# Patient Record
Sex: FEMALE | Race: WHITE | Employment: OTHER | URBAN - METROPOLITAN AREA
[De-identification: names, ages, dates, MRNs, and addresses within clinical notes are randomized per-mention and may not be internally consistent; named-entity substitution may affect disease eponyms.]

---

## 2017-10-31 PROBLEM — I63.9 CEREBROVASCULAR ACCIDENT (CVA) (HCC): Status: ACTIVE | Noted: 2017-10-31

## 2017-10-31 PROBLEM — K27.4 PEPTIC ULCER WITH HEMORRHAGE: Status: ACTIVE | Noted: 2017-10-31

## 2017-10-31 PROBLEM — J44.9 CHRONIC OBSTRUCTIVE PULMONARY DISEASE (HCC): Status: ACTIVE | Noted: 2017-10-31

## 2017-10-31 PROBLEM — I10 HYPERTENSION: Status: ACTIVE | Noted: 2017-10-31

## 2019-04-01 ENCOUNTER — OFFICE VISIT (OUTPATIENT)
Dept: FAMILY MEDICINE CLINIC | Age: 74
End: 2019-04-01
Payer: MEDICARE

## 2019-04-01 ENCOUNTER — HOSPITAL ENCOUNTER (OUTPATIENT)
Age: 74
Discharge: HOME | End: 2019-04-03
Payer: MEDICARE

## 2019-04-01 VITALS
HEART RATE: 108 BPM | TEMPERATURE: 98.2 F | WEIGHT: 160.8 LBS | DIASTOLIC BLOOD PRESSURE: 110 MMHG | RESPIRATION RATE: 18 BRPM | BODY MASS INDEX: 34.69 KG/M2 | SYSTOLIC BLOOD PRESSURE: 200 MMHG | HEIGHT: 57 IN | OXYGEN SATURATION: 98 %

## 2019-04-01 DIAGNOSIS — Z72.0 TOBACCO ABUSE: ICD-10-CM

## 2019-04-01 DIAGNOSIS — D64.9 ANEMIA, UNSPECIFIED TYPE: ICD-10-CM

## 2019-04-01 DIAGNOSIS — E78.2 MIXED HYPERLIPIDEMIA: ICD-10-CM

## 2019-04-01 DIAGNOSIS — R73.01 IFG (IMPAIRED FASTING GLUCOSE): ICD-10-CM

## 2019-04-01 DIAGNOSIS — R53.83 FATIGUE, UNSPECIFIED TYPE: ICD-10-CM

## 2019-04-01 DIAGNOSIS — I10 ESSENTIAL HYPERTENSION: ICD-10-CM

## 2019-04-01 DIAGNOSIS — Z12.39 SCREENING FOR BREAST CANCER: ICD-10-CM

## 2019-04-01 DIAGNOSIS — J44.9 CHRONIC OBSTRUCTIVE PULMONARY DISEASE, UNSPECIFIED COPD TYPE (HCC): ICD-10-CM

## 2019-04-01 DIAGNOSIS — J44.9 CHRONIC OBSTRUCTIVE PULMONARY DISEASE, UNSPECIFIED COPD TYPE (HCC): Primary | ICD-10-CM

## 2019-04-01 LAB
ALBUMIN SERPL-MCNC: 4.6 G/DL (ref 3.5–5.2)
ALP BLD-CCNC: 109 U/L (ref 35–104)
ALT SERPL-CCNC: 12 U/L (ref 0–32)
ANION GAP SERPL CALCULATED.3IONS-SCNC: 17 MMOL/L (ref 7–16)
AST SERPL-CCNC: 21 U/L (ref 0–31)
BASOPHILS ABSOLUTE: 0.04 E9/L (ref 0–0.2)
BASOPHILS RELATIVE PERCENT: 0.6 % (ref 0–2)
BILIRUB SERPL-MCNC: 0.4 MG/DL (ref 0–1.2)
BUN BLDV-MCNC: 15 MG/DL (ref 8–23)
CALCIUM SERPL-MCNC: 9.4 MG/DL (ref 8.6–10.2)
CHLORIDE BLD-SCNC: 98 MMOL/L (ref 98–107)
CHOLESTEROL, TOTAL: 223 MG/DL (ref 0–199)
CO2: 28 MMOL/L (ref 22–29)
CREAT SERPL-MCNC: 0.7 MG/DL (ref 0.5–1)
EOSINOPHILS ABSOLUTE: 0.09 E9/L (ref 0.05–0.5)
EOSINOPHILS RELATIVE PERCENT: 1.3 % (ref 0–6)
GFR AFRICAN AMERICAN: >60
GFR NON-AFRICAN AMERICAN: >60 ML/MIN/1.73
GLUCOSE BLD-MCNC: 112 MG/DL (ref 74–99)
HBA1C MFR BLD: 5.3 % (ref 4–5.6)
HCT VFR BLD CALC: 49.1 % (ref 34–48)
HDLC SERPL-MCNC: 70 MG/DL
HEMOGLOBIN: 15.2 G/DL (ref 11.5–15.5)
IMMATURE GRANULOCYTES #: 0.02 E9/L
IMMATURE GRANULOCYTES %: 0.3 % (ref 0–5)
LDL CHOLESTEROL CALCULATED: 122 MG/DL (ref 0–99)
LYMPHOCYTES ABSOLUTE: 1.1 E9/L (ref 1.5–4)
LYMPHOCYTES RELATIVE PERCENT: 16.1 % (ref 20–42)
MCH RBC QN AUTO: 31.5 PG (ref 26–35)
MCHC RBC AUTO-ENTMCNC: 31 % (ref 32–34.5)
MCV RBC AUTO: 101.9 FL (ref 80–99.9)
MONOCYTES ABSOLUTE: 0.48 E9/L (ref 0.1–0.95)
MONOCYTES RELATIVE PERCENT: 7 % (ref 2–12)
NEUTROPHILS ABSOLUTE: 5.12 E9/L (ref 1.8–7.3)
NEUTROPHILS RELATIVE PERCENT: 74.7 % (ref 43–80)
PDW BLD-RTO: 13.8 FL (ref 11.5–15)
PLATELET # BLD: 285 E9/L (ref 130–450)
PMV BLD AUTO: 11.7 FL (ref 7–12)
POTASSIUM SERPL-SCNC: 4.6 MMOL/L (ref 3.5–5)
RBC # BLD: 4.82 E12/L (ref 3.5–5.5)
SODIUM BLD-SCNC: 143 MMOL/L (ref 132–146)
TOTAL PROTEIN: 8.5 G/DL (ref 6.4–8.3)
TRIGL SERPL-MCNC: 155 MG/DL (ref 0–149)
TSH SERPL DL<=0.05 MIU/L-ACNC: 1.3 UIU/ML (ref 0.27–4.2)
VLDLC SERPL CALC-MCNC: 31 MG/DL
WBC # BLD: 6.9 E9/L (ref 4.5–11.5)

## 2019-04-01 PROCEDURE — G8599 NO ASA/ANTIPLAT THER USE RNG: HCPCS | Performed by: FAMILY MEDICINE

## 2019-04-01 PROCEDURE — 3023F SPIROM DOC REV: CPT | Performed by: FAMILY MEDICINE

## 2019-04-01 PROCEDURE — 99215 OFFICE O/P EST HI 40 MIN: CPT | Performed by: FAMILY MEDICINE

## 2019-04-01 PROCEDURE — G8926 SPIRO NO PERF OR DOC: HCPCS | Performed by: FAMILY MEDICINE

## 2019-04-01 PROCEDURE — 3017F COLORECTAL CA SCREEN DOC REV: CPT | Performed by: FAMILY MEDICINE

## 2019-04-01 PROCEDURE — 83036 HEMOGLOBIN GLYCOSYLATED A1C: CPT

## 2019-04-01 PROCEDURE — 1090F PRES/ABSN URINE INCON ASSESS: CPT | Performed by: FAMILY MEDICINE

## 2019-04-01 PROCEDURE — 36415 COLL VENOUS BLD VENIPUNCTURE: CPT | Performed by: FAMILY MEDICINE

## 2019-04-01 PROCEDURE — 85025 COMPLETE CBC W/AUTO DIFF WBC: CPT

## 2019-04-01 PROCEDURE — 84443 ASSAY THYROID STIM HORMONE: CPT

## 2019-04-01 PROCEDURE — 80053 COMPREHEN METABOLIC PANEL: CPT

## 2019-04-01 PROCEDURE — 1123F ACP DISCUSS/DSCN MKR DOCD: CPT | Performed by: FAMILY MEDICINE

## 2019-04-01 PROCEDURE — G8417 CALC BMI ABV UP PARAM F/U: HCPCS | Performed by: FAMILY MEDICINE

## 2019-04-01 PROCEDURE — G8427 DOCREV CUR MEDS BY ELIG CLIN: HCPCS | Performed by: FAMILY MEDICINE

## 2019-04-01 PROCEDURE — 1036F TOBACCO NON-USER: CPT | Performed by: FAMILY MEDICINE

## 2019-04-01 PROCEDURE — 4040F PNEUMOC VAC/ADMIN/RCVD: CPT | Performed by: FAMILY MEDICINE

## 2019-04-01 PROCEDURE — G8400 PT W/DXA NO RESULTS DOC: HCPCS | Performed by: FAMILY MEDICINE

## 2019-04-01 PROCEDURE — 80061 LIPID PANEL: CPT

## 2019-04-01 RX ORDER — LISINOPRIL 5 MG/1
TABLET ORAL
Qty: 90 TABLET | Refills: 1 | Status: SHIPPED | OUTPATIENT
Start: 2019-04-01 | End: 2019-04-10 | Stop reason: ALTCHOICE

## 2019-04-01 RX ORDER — METOPROLOL TARTRATE 50 MG/1
TABLET, FILM COATED ORAL
Qty: 180 TABLET | Refills: 1 | Status: SHIPPED
Start: 2019-04-01 | End: 2021-05-17 | Stop reason: SDUPTHER

## 2019-04-01 RX ORDER — FUROSEMIDE 40 MG/1
TABLET ORAL
Qty: 90 TABLET | Refills: 1 | Status: SHIPPED
Start: 2019-04-01 | End: 2021-05-17 | Stop reason: SDUPTHER

## 2019-04-01 RX ORDER — ALBUTEROL SULFATE 90 UG/1
2 AEROSOL, METERED RESPIRATORY (INHALATION) EVERY 6 HOURS PRN
Qty: 1 INHALER | Refills: 3 | Status: SHIPPED | OUTPATIENT
Start: 2019-04-01 | End: 2019-04-10 | Stop reason: CLARIF

## 2019-04-01 ASSESSMENT — PATIENT HEALTH QUESTIONNAIRE - PHQ9
SUM OF ALL RESPONSES TO PHQ9 QUESTIONS 1 & 2: 0
SUM OF ALL RESPONSES TO PHQ QUESTIONS 1-9: 0
1. LITTLE INTEREST OR PLEASURE IN DOING THINGS: 0
SUM OF ALL RESPONSES TO PHQ QUESTIONS 1-9: 0
2. FEELING DOWN, DEPRESSED OR HOPELESS: 0

## 2019-04-04 ASSESSMENT — ENCOUNTER SYMPTOMS
EYE PAIN: 0
ALLERGIC/IMMUNOLOGIC NEGATIVE: 1
NAUSEA: 0
TROUBLE SWALLOWING: 0
COLOR CHANGE: 0
ABDOMINAL DISTENTION: 0
SINUS PRESSURE: 0
COUGH: 1
SORE THROAT: 0
CHEST TIGHTNESS: 0
BACK PAIN: 0
SINUS PAIN: 0
VOICE CHANGE: 0
CHOKING: 0
PHOTOPHOBIA: 0
EYE REDNESS: 0
EYES NEGATIVE: 1
RHINORRHEA: 0
FACIAL SWELLING: 0
ABDOMINAL PAIN: 0
WHEEZING: 1
RECTAL PAIN: 0
EYE DISCHARGE: 0
STRIDOR: 0
ANAL BLEEDING: 0
BLOOD IN STOOL: 0
VOMITING: 0
DIARRHEA: 0
SHORTNESS OF BREATH: 1
EYE ITCHING: 0
CONSTIPATION: 0

## 2019-04-05 NOTE — PROGRESS NOTES
SUBJECTIVE  Francesco Delcid is a 68 y.o. female. HPI/Chief C/O:  Chief Complaint   Patient presents with    Annual Exam     Pt here today for an annual exam    Medication Refill     Pt here today for medication refills- meds pended     No Known Allergies  This 68year old female presents for physical exam. Pt has COPD ([address] hypertension, hyperlipidemia, fatigue, anemia, and tobacco abuse. ROS:  Review of Systems   Constitutional: Positive for fatigue. Negative for activity change, appetite change, chills, diaphoresis, fever and unexpected weight change. HENT: Negative. Negative for congestion, dental problem, drooling, ear discharge, ear pain, facial swelling, hearing loss, mouth sores, nosebleeds, postnasal drip, rhinorrhea, sinus pressure, sinus pain, sneezing, sore throat, tinnitus, trouble swallowing and voice change. Eyes: Negative. Negative for photophobia, pain, discharge, redness, itching and visual disturbance. Respiratory: Positive for cough, shortness of breath and wheezing. Negative for choking, chest tightness and stridor. Cardiovascular: Negative. Negative for chest pain, palpitations and leg swelling. Gastrointestinal: Negative for abdominal distention, abdominal pain, anal bleeding, blood in stool, constipation, diarrhea, nausea, rectal pain and vomiting. Endocrine: Negative. Negative for cold intolerance, heat intolerance, polydipsia, polyphagia and polyuria. Genitourinary: Negative. Negative for decreased urine volume, difficulty urinating, dysuria, flank pain, frequency, genital sores, hematuria, menstrual problem, pelvic pain and urgency. Musculoskeletal: Positive for arthralgias. Negative for back pain, gait problem, joint swelling, myalgias, neck pain and neck stiffness. Skin: Negative. Negative for color change, pallor, rash and wound. Allergic/Immunologic: Negative. Neurological: Negative.   Negative for dizziness, tremors, seizures, syncope, facial asymmetry, speech difficulty, weakness, light-headedness, numbness and headaches. Hematological: Negative. Negative for adenopathy. Does not bruise/bleed easily. Psychiatric/Behavioral: Negative for agitation, behavioral problems, confusion, decreased concentration, dysphoric mood, hallucinations, self-injury, sleep disturbance and suicidal ideas. The patient is nervous/anxious. The patient is not hyperactive. Past Medical/Surgical Hx;  Reviewed with patient      Diagnosis Date    Hypertension     Stroke [facility])      Past Surgical History:   Procedure Laterality Date    TONSILLECTOMY         Past Family Hx:  Reviewed with patient      Problem Relation Age of Onset    High Blood Pressure Mother     Other Father     High Blood Pressure Father        Social Hx:  Reviewed with patient  Social History     Tobacco Use    Smoking status: Former Smoker     Packs/day: 2.00     Years: 54.00     Pack years: 108.00     Types: Cigarettes     Start date: 1963     Last attempt to quit: 2017     Years since quittin.5    Smokeless tobacco: Never Used   Substance Use Topics    Alcohol use: Yes       OBJECTIVE  BP (!) 200/110   Pulse 108   Temp 98.2 °F (36.8 °C)   Resp 18   Ht 4' 9.01\" (1.448 m)   Wt 160 lb 12.8 oz (72.9 kg)   SpO2 98%   Breastfeeding? No   BMI 34.79 kg/m²     Problem List:  Adelfo Florentino does not have any pertinent problems on file. PHYS EX:  Physical Exam   Constitutional: She is oriented to person, place, and time. She appears well-developed and well-nourished. No distress. HENT:   Head: Normocephalic and atraumatic. Right Ear: External ear normal.   Left Ear: External ear normal.   Nose: Nose normal.   Mouth/Throat: Oropharynx is clear and moist. No oropharyngeal exudate. Eyes: Pupils are equal, round, and reactive to light. Conjunctivae and EOM are normal. Right eye exhibits no discharge. Left eye exhibits no discharge. No scleral icterus.    Neck: Normal range of motion. Neck supple. No JVD present. No tracheal deviation present. No thyromegaly present. Cardiovascular: Normal rate, regular rhythm, normal heart sounds and intact distal pulses. Exam reveals no gallop and no friction rub. No murmur heard. Pulmonary/Chest: Effort normal. No stridor. No respiratory distress. She has no wheezes. She has no rales. She exhibits no tenderness. Pt has distant air sounds. Abdominal: Soft. Bowel sounds are normal. She exhibits no distension and no mass. There is no tenderness. There is no rebound and no guarding. No hernia. Musculoskeletal: She exhibits tenderness. She exhibits no edema or deformity. Pain and decreased ROM multiple joints. Lymphadenopathy:     She has no cervical adenopathy. Neurological: She is alert and oriented to person, place, and time. She has normal reflexes. She displays normal reflexes. No cranial nerve deficit or sensory deficit. She exhibits normal muscle tone. Coordination normal.   Skin: Skin is warm. No rash noted. She is not diaphoretic. No erythema. No pallor. Psychiatric: She has a normal mood and affect. Her behavior is normal. Judgment and thought content normal.   Nursing note and vitals reviewed. Quality & Risk Score Accuracy    Visit Dx:  J44.9 - Chronic obstructive pulmonary disease, unspecified COPD type ([facility]  Assessment and plan:  Stable based upon symptoms and exam. Continue current treatment plan and follow up at least yearly. Last edited 04/04/19 22:13 EDT by Sim Rinne, DO           ASSESSMENT/PLAN  Compa was seen today for annual exam and medication refill. Diagnoses and all orders for this visit:    Chronic obstructive pulmonary disease, unspecified COPD type ([facility]  -     albuterol sulfate  (90 Base) MCG/ACT inhaler; Inhale 2 puffs into the lungs every 6 hours as needed for Wheezing  -     mometasone-formoterol (DULERA) 200-5 MCG/ACT inhaler;  Inhale 1 puff into the lungs 2 times daily  - CBC Auto Differential; Future  -     Comprehensive Metabolic Panel; Future  Stable. Lab, albuteol, dulera, oxygen. Pt refuses to take spiriva due to cost.   Essential hypertension  -     furosemide (LASIX) 40 MG tablet; TAKE ONE TABLET BY MOUTH EVERY DAY  -     metoprolol tartrate (LOPRESSOR) 50 MG tablet; TAKE ONE TABLET BY MOUTH TWO TIMES A DAY  -     lisinopril (PRINIVIL;ZESTRIL) 5 MG tablet; TAKE ONE TABLET BY MOUTH EVERY DAY  -     CBC Auto Differential; Future  -     Comprehensive Metabolic Panel; Future  Controlled. Lab, BB, ace, lasix. Mixed hyperlipidemia  -     CBC Auto Differential; Future  -     Comprehensive Metabolic Panel; Future  -     Lipid Panel; Future  Not controlled. Lab, low chol. Diet. Fatigue, unspecified type  -     CBC Auto Differential; Future  -     Comprehensive Metabolic Panel; Future  -     TSH without Reflex; Future  Not controlled. Lab. IFG (impaired fasting glucose)  -     CBC Auto Differential; Future  -     Comprehensive Metabolic Panel; Future  -     Hemoglobin A1C; Future  -     Lipid Panel; Future  Patient instructed on labs    Anemia, unspecified type  -     CBC Auto Differential; Future  -     Comprehensive Metabolic Panel; Future  Stable. Lab, anemia. Screening for breast cancer  -     CBC Auto Differential; Future  -     Comprehensive Metabolic Panel; Future  -     RINA DIGITAL SCREEN W OR WO CAD BILATERAL; Future  Pt instructed on curry. Rina.   Tobacco abuse  -     CBC Auto Differential; Future  -     Comprehensive Metabolic Panel; Future  -     CT LUNG SCREENING (ANNUAL); Future  Not controlled. Lab, low dose CT lung. Pt instructed if any worse go ED ASAP.         Outpatient Encounter Medications as of 4/1/2019   Medication Sig Dispense Refill    furosemide (LASIX) 40 MG tablet TAKE ONE TABLET BY MOUTH EVERY DAY 90 tablet 1    metoprolol tartrate (LOPRESSOR) 50 MG tablet TAKE ONE TABLET BY MOUTH TWO TIMES A  tablet 1    lisinopril (PRINIVIL;ZESTRIL) 5 MG tablet TAKE ONE TABLET BY MOUTH EVERY DAY 90 tablet 1    albuterol sulfate  (90 Base) MCG/ACT inhaler Inhale 2 puffs into the lungs every 6 hours as needed for Wheezing 1 Inhaler 3    mometasone-formoterol (DULERA) 200-5 MCG/ACT inhaler Inhale 1 puff into the lungs 2 times daily 1 Inhaler 3    ferrous sulfate 325 (65 Fe) MG tablet TAKE ONE TABLET BY MOUTH TWO TIMES A DAY  0    guaiFENesin (ROBITUSSIN) 100 MG/5ML liquid TAKE 10 MLS BY MOUTH TWICE DAILY FOR 7 DAYS. 236 mL 0    sucralfate (CARAFATE) 1 GM tablet Take 1 tablet by mouth 4 times daily 949 tablet 3    folic acid (FOLVITE) 1 MG tablet TAKE ONE TABLET BY MOUTH EVERY DAY 90 tablet 1    vitamin B-1 (THIAMINE) 100 MG tablet TAKE ONE TABLET BY MOUTH DAILY 90 tablet 1    [DISCONTINUED] lisinopril (PRINIVIL;ZESTRIL) 5 MG tablet TAKE ONE TABLET BY MOUTH EVERY DAY  0    [DISCONTINUED] albuterol sulfate  (90 Base) MCG/ACT inhaler Inhale 2 puffs into the lungs every 6 hours as needed for Wheezing      [DISCONTINUED] pantoprazole (PROTONIX) 40 MG tablet Take 40 mg by mouth daily      [DISCONTINUED] furosemide (LASIX) 40 MG tablet TAKE ONE TABLET BY MOUTH EVERY DAY 90 tablet 1    [DISCONTINUED] fluticasone (FLONASE) 50 MCG/ACT nasal spray 1 spray by Nasal route daily 1 Bottle 3    [DISCONTINUED] metoprolol tartrate (LOPRESSOR) 50 MG tablet TAKE ONE TABLET BY MOUTH TWO TIMES A  tablet 1    [DISCONTINUED] mometasone-formoterol (DULERA) 200-5 MCG/ACT inhaler Inhale 1 puff into the lungs 2 times daily 1 Inhaler 0    [DISCONTINUED] tiotropium (SPIRIVA RESPIMAT) 2.5 MCG/ACT AERS inhaler Inhale 2 puffs into the lungs daily 1 Inhaler 0     No facility-administered encounter medications on file as of 4/1/2019. Return in about 1 week (around 4/8/2019).   Quality & Risk Score Accuracy    Visit Dx:  J44.9 - Chronic obstructive pulmonary disease, unspecified COPD type ([address]  Assessment and plan:  Stable based upon symptoms and exam. Continue current treatment plan and follow up at least yearly.   Last edited 04/04/19 22:13 EDT by Phyllis Chatman DO           Reviewed recent labs related to Compa's current problems      Discussed importance of regular Health Maintenance follow up  Health Maintenance   Topic    Hepatitis C screen     DTaP/Tdap/Td vaccine (1 - Tdap)    Breast cancer screen     Shingles Vaccine (1 of 2)    Colon cancer screen colonoscopy     Low dose CT lung screening     DEXA (modify frequency per FRAX score)     Pneumococcal 65+ years Vaccine (2 of 2 - PPSV23)    Flu vaccine (Season Ended)    Potassium monitoring     Creatinine monitoring     Lipid screen

## 2019-04-10 ENCOUNTER — OFFICE VISIT (OUTPATIENT)
Dept: FAMILY MEDICINE CLINIC | Age: 74
End: 2019-04-10
Payer: MEDICARE

## 2019-04-10 VITALS
TEMPERATURE: 97.5 F | OXYGEN SATURATION: 94 % | WEIGHT: 158.2 LBS | HEIGHT: 57 IN | BODY MASS INDEX: 34.13 KG/M2 | SYSTOLIC BLOOD PRESSURE: 188 MMHG | HEART RATE: 62 BPM | RESPIRATION RATE: 18 BRPM | DIASTOLIC BLOOD PRESSURE: 96 MMHG

## 2019-04-10 DIAGNOSIS — Z12.11 SCREEN FOR COLON CANCER: ICD-10-CM

## 2019-04-10 DIAGNOSIS — I63.9 CEREBROVASCULAR ACCIDENT (CVA), UNSPECIFIED MECHANISM (HCC): ICD-10-CM

## 2019-04-10 DIAGNOSIS — E78.2 MIXED HYPERLIPIDEMIA: ICD-10-CM

## 2019-04-10 DIAGNOSIS — K27.4 PEPTIC ULCER WITH HEMORRHAGE: ICD-10-CM

## 2019-04-10 DIAGNOSIS — I10 ESSENTIAL HYPERTENSION: Primary | ICD-10-CM

## 2019-04-10 DIAGNOSIS — J44.9 CHRONIC OBSTRUCTIVE PULMONARY DISEASE, UNSPECIFIED COPD TYPE (HCC): ICD-10-CM

## 2019-04-10 PROCEDURE — G8599 NO ASA/ANTIPLAT THER USE RNG: HCPCS | Performed by: FAMILY MEDICINE

## 2019-04-10 PROCEDURE — 3017F COLORECTAL CA SCREEN DOC REV: CPT | Performed by: FAMILY MEDICINE

## 2019-04-10 PROCEDURE — G8400 PT W/DXA NO RESULTS DOC: HCPCS | Performed by: FAMILY MEDICINE

## 2019-04-10 PROCEDURE — G8427 DOCREV CUR MEDS BY ELIG CLIN: HCPCS | Performed by: FAMILY MEDICINE

## 2019-04-10 PROCEDURE — 3023F SPIROM DOC REV: CPT | Performed by: FAMILY MEDICINE

## 2019-04-10 PROCEDURE — G8926 SPIRO NO PERF OR DOC: HCPCS | Performed by: FAMILY MEDICINE

## 2019-04-10 PROCEDURE — G8417 CALC BMI ABV UP PARAM F/U: HCPCS | Performed by: FAMILY MEDICINE

## 2019-04-10 PROCEDURE — 1090F PRES/ABSN URINE INCON ASSESS: CPT | Performed by: FAMILY MEDICINE

## 2019-04-10 PROCEDURE — 1123F ACP DISCUSS/DSCN MKR DOCD: CPT | Performed by: FAMILY MEDICINE

## 2019-04-10 PROCEDURE — 1036F TOBACCO NON-USER: CPT | Performed by: FAMILY MEDICINE

## 2019-04-10 PROCEDURE — 4040F PNEUMOC VAC/ADMIN/RCVD: CPT | Performed by: FAMILY MEDICINE

## 2019-04-10 PROCEDURE — 99214 OFFICE O/P EST MOD 30 MIN: CPT | Performed by: FAMILY MEDICINE

## 2019-04-10 RX ORDER — SUCRALFATE 1 G/1
1 TABLET ORAL 4 TIMES DAILY
Qty: 120 TABLET | Refills: 5 | Status: SHIPPED
Start: 2019-04-10 | End: 2021-07-02 | Stop reason: SDUPTHER

## 2019-04-10 RX ORDER — AMLODIPINE BESYLATE AND BENAZEPRIL HYDROCHLORIDE 5; 20 MG/1; MG/1
CAPSULE ORAL
Qty: 90 CAPSULE | Refills: 1 | Status: SHIPPED
Start: 2019-04-10 | End: 2021-05-17 | Stop reason: SDUPTHER

## 2019-04-10 ASSESSMENT — ENCOUNTER SYMPTOMS
ANAL BLEEDING: 0
NAUSEA: 0
PHOTOPHOBIA: 0
RECTAL PAIN: 0
ABDOMINAL PAIN: 0
FACIAL SWELLING: 0
SINUS PAIN: 0
APNEA: 0
FREQUENT THROAT CLEARING: 0
EYE DISCHARGE: 0
WHEEZING: 0
CHOKING: 0
EYE ITCHING: 0
SINUS PRESSURE: 0
CHEST TIGHTNESS: 0
RHINORRHEA: 0
SHORTNESS OF BREATH: 1
STRIDOR: 0
HOARSE VOICE: 0
BLOOD IN STOOL: 0
DIFFICULTY BREATHING: 1
COUGH: 0
ORTHOPNEA: 0
SPUTUM PRODUCTION: 0
ALLERGIC/IMMUNOLOGIC NEGATIVE: 1
TROUBLE SWALLOWING: 0
EYES NEGATIVE: 1
VOMITING: 0
HEARTBURN: 0
VOICE CHANGE: 0
SORE THROAT: 0
CONSTIPATION: 0
EYE REDNESS: 0
HEMOPTYSIS: 0
BLURRED VISION: 0
BACK PAIN: 0
DIARRHEA: 0
ABDOMINAL DISTENTION: 0
COLOR CHANGE: 0
EYE PAIN: 0

## 2019-04-10 ASSESSMENT — COPD QUESTIONNAIRES: COPD: 1

## 2019-04-10 NOTE — PROGRESS NOTES
SHAREE Beckett is a 68 y.o. female. HPI/Chief C/O:  Chief Complaint   Patient presents with    Blood Pressure Check     Pt here today for a blood pressure check    Medication Refill     Pt here today for medication refills- meds pended     No Known Allergies  The patient is here for a medication list and treatment planning review  We will go over our care planning goals as well as take care of all refills  We will set up labs as well     Hypertension   This is a chronic problem. The current episode started more than 1 year ago. The problem is uncontrolled. Associated symptoms include anxiety, malaise/fatigue and shortness of breath. Pertinent negatives include no blurred vision, chest pain, headaches, neck pain, orthopnea, palpitations, peripheral edema, PND or sweats. Risk factors for coronary artery disease include post-menopausal state, smoking/tobacco exposure and family history. Past treatments include lifestyle changes, diuretics, beta blockers, calcium channel blockers and ACE inhibitors. Compliance problems include diet, exercise and psychosocial issues. Hypertensive end-organ damage includes CVA. There is no history of angina, kidney disease, CAD/MI, heart failure, left ventricular hypertrophy, PVD or retinopathy. There is no history of chronic renal disease, coarctation of the aorta, hyperaldosteronism, hypercortisolism, hyperparathyroidism, a hypertension causing med, pheochromocytoma, renovascular disease, sleep apnea or a thyroid problem. COPD   She complains of difficulty breathing and shortness of breath. There is no chest tightness, cough, frequent throat clearing, hemoptysis, hoarse voice, sputum production or wheezing. This is a chronic problem. The current episode started more than 1 month ago. The problem occurs constantly. The problem has been gradually worsening. Associated symptoms include malaise/fatigue.  Pertinent negatives include no appetite change, chest pain, ear congestion, ear pain, fever, headaches, heartburn, myalgias, nasal congestion, orthopnea, PND, postnasal drip, rhinorrhea, sneezing, sore throat, sweats, trouble swallowing or weight loss. Her symptoms are aggravated by any activity. Her past medical history is significant for COPD. There is no history of asthma, bronchiectasis, bronchitis or pneumonia. ROS:  Review of Systems   Constitutional: Positive for fatigue and malaise/fatigue. Negative for activity change, appetite change, chills, diaphoresis, fever, unexpected weight change and weight loss. HENT: Negative. Negative for congestion, dental problem, drooling, ear discharge, ear pain, facial swelling, hearing loss, hoarse voice, mouth sores, nosebleeds, postnasal drip, rhinorrhea, sinus pressure, sinus pain, sneezing, sore throat, tinnitus, trouble swallowing and voice change. Eyes: Negative. Negative for blurred vision, photophobia, pain, discharge, redness, itching and visual disturbance. Respiratory: Positive for shortness of breath. Negative for apnea, cough, hemoptysis, sputum production, choking, chest tightness, wheezing and stridor. Cardiovascular: Negative. Negative for chest pain, palpitations, orthopnea, leg swelling and PND. Gastrointestinal: Negative for abdominal distention, abdominal pain, anal bleeding, blood in stool, constipation, diarrhea, heartburn, nausea, rectal pain and vomiting. Endocrine: Negative. Negative for cold intolerance, heat intolerance, polydipsia, polyphagia and polyuria. Genitourinary: Negative. Negative for decreased urine volume, difficulty urinating, dysuria, flank pain, frequency, genital sores, hematuria, menstrual problem, pelvic pain and urgency. Musculoskeletal: Positive for arthralgias. Negative for back pain, gait problem, joint swelling, myalgias, neck pain and neck stiffness. Skin: Negative. Negative for color change, pallor, rash and wound. Allergic/Immunologic: Negative. Neurological: Positive for weakness. Negative for dizziness, tremors, seizures, syncope, facial asymmetry, speech difficulty, light-headedness, numbness and headaches. Hematological: Negative. Negative for adenopathy. Does not bruise/bleed easily. Psychiatric/Behavioral: Negative for agitation, behavioral problems, confusion, decreased concentration, dysphoric mood, hallucinations, self-injury, sleep disturbance and suicidal ideas. The patient is nervous/anxious. The patient is not hyperactive. Past Medical/Surgical Hx;  Reviewed with patient      Diagnosis Date    Hypertension     Stroke [facility])      Past Surgical History:   Procedure Laterality Date    TONSILLECTOMY         Past Family Hx:  Reviewed with patient      Problem Relation Age of Onset    High Blood Pressure Mother     Other Father     High Blood Pressure Father        Social Hx:  Reviewed with patient  Social History     Tobacco Use    Smoking status: Former Smoker     Packs/day: 2.00     Years: 54.00     Pack years: 108.00     Types: Cigarettes     Start date: 1963     Last attempt to quit: 2017     Years since quittin.5    Smokeless tobacco: Never Used   Substance Use Topics    Alcohol use: Yes       OBJECTIVE  BP (!) 188/96   Pulse 62   Temp 97.5 °F (36.4 °C)   Resp 18   Ht 4' 9.01\" (1.448 m)   Wt 158 lb 3.2 oz (71.8 kg)   SpO2 94%   Breastfeeding? No   BMI 34.22 kg/m²     Problem List:  Tomasa Gentleman does not have any pertinent problems on file. PHYS EX:  Physical Exam   Constitutional: She is oriented to person, place, and time. She appears well-developed and well-nourished. No distress. She is not intubated. HENT:   Head: Normocephalic and atraumatic. Right Ear: External ear normal.   Left Ear: External ear normal.   Nose: Nose normal.   Mouth/Throat: Oropharynx is clear and moist. No oropharyngeal exudate. Eyes: Pupils are equal, round, and reactive to light.  Conjunctivae and EOM are normal. Right eye exhibits no discharge. Left eye exhibits no discharge. No scleral icterus. Neck: Normal range of motion. Neck supple. No JVD present. No tracheal deviation present. No thyromegaly present. Cardiovascular: Normal rate, regular rhythm, normal heart sounds and intact distal pulses. Exam reveals no gallop and no friction rub. No murmur heard. Pulmonary/Chest: Effort normal. No accessory muscle usage or stridor. No apnea, no tachypnea and no bradypnea. She is not intubated. No respiratory distress. She has decreased breath sounds in the right upper field, the right middle field, the right lower field, the left upper field, the left middle field and the left lower field. She has no wheezes. She has no rhonchi. She has no rales. She exhibits no tenderness. Abdominal: Soft. Bowel sounds are normal. She exhibits no distension and no mass. There is no tenderness. There is no rebound and no guarding. No hernia. Musculoskeletal: She exhibits tenderness. She exhibits no edema or deformity. Pain and decreased ROM multiple joints. Lymphadenopathy:     She has no cervical adenopathy. Neurological: She is alert and oriented to person, place, and time. She has normal reflexes. She displays normal reflexes. No cranial nerve deficit or sensory deficit. She exhibits normal muscle tone. Coordination normal.   Skin: Skin is warm. No rash noted. She is not diaphoretic. No erythema. No pallor. Nursing note and vitals reviewed. ASSESSMENT/PLAN  Compa was seen today for blood pressure check and medication refill. Diagnoses and all orders for this visit:    Essential hypertension  -     amLODIPine-benazepril (LOTREL) 5-20 MG per capsule; Take one at supper  --low salt  --I will stop the ACE and add Lotrel 5 /20   ---CARDIAC---asa, ACE, BETA, statin, LASIX, ( CCB )    Peptic ulcer with hemorrhage  -     sucralfate (CARAFATE) 1 GM tablet;  Take 1 tablet by mouth 4 times daily    Chronic obstructive pulmonary disease, unspecified COPD type ([facility]  -     Fluticasone-Umeclidin-Vilant (Jenny Nas) 100-62.5-25 MCG/INH AEPB; Inhale 100 mcg into the lungs daily  --PLAN--aerosol accuneb 1.25 plus chest percussion--Rx      Cerebrovascular accident (CVA), unspecified mechanism (HCC)\  ---VASCULAR PANEL  A) asa, plavix, aggrenox  B) coumadin, pletal, tzd, statin  C) ACE, LASIX, FOLIC, CCB  D) cannikinumab, fish oils       Mixed hyperlipidemia  --Mediterranean diet, exercise, weight loss, vitamins    We have a long talk on cholesterol and importance of lowering it       Screen for colon cancer  -     POCT Fecal Immunochemical Test (FIT); Future        Outpatient Encounter Medications as of 4/10/2019   Medication Sig Dispense Refill    sucralfate (CARAFATE) 1 GM tablet Take 1 tablet by mouth 4 times daily 120 tablet 5    amLODIPine-benazepril (LOTREL) 5-20 MG per capsule Take one at supper 90 capsule 1    Fluticasone-Umeclidin-Vilant (TRELEGY ELLIPTA) 100-62.5-25 MCG/INH AEPB Inhale 100 mcg into the lungs daily 1 each 0    furosemide (LASIX) 40 MG tablet TAKE ONE TABLET BY MOUTH EVERY DAY 90 tablet 1    metoprolol tartrate (LOPRESSOR) 50 MG tablet TAKE ONE TABLET BY MOUTH TWO TIMES A  tablet 1    ferrous sulfate 325 (65 Fe) MG tablet TAKE ONE TABLET BY MOUTH TWO TIMES A DAY  0    guaiFENesin (ROBITUSSIN) 100 MG/5ML liquid TAKE 10 MLS BY MOUTH TWICE DAILY FOR 7 DAYS.  283 mL 0    folic acid (FOLVITE) 1 MG tablet TAKE ONE TABLET BY MOUTH EVERY DAY 90 tablet 1    vitamin B-1 (THIAMINE) 100 MG tablet TAKE ONE TABLET BY MOUTH DAILY 90 tablet 1    [DISCONTINUED] lisinopril (PRINIVIL;ZESTRIL) 5 MG tablet TAKE ONE TABLET BY MOUTH EVERY DAY 90 tablet 1    [DISCONTINUED] albuterol sulfate  (90 Base) MCG/ACT inhaler Inhale 2 puffs into the lungs every 6 hours as needed for Wheezing 1 Inhaler 3    [DISCONTINUED] mometasone-formoterol (DULERA) 200-5 MCG/ACT inhaler Inhale 1 puff into the lungs 2 times daily 1 Inhaler 3    [DISCONTINUED] sucralfate (CARAFATE) 1 GM tablet Take 1 tablet by mouth 4 times daily 120 tablet 3     No facility-administered encounter medications on file as of 4/10/2019. Return in about 2 weeks (around 4/24/2019).         Reviewed recent labs related to Compa's current problems      Discussed importance of regular Health Maintenance follow up  Health Maintenance   Topic    Hepatitis C screen     DTaP/Tdap/Td vaccine (1 - Tdap)    Breast cancer screen     Shingles Vaccine (1 of 2)    Colon cancer screen colonoscopy     Low dose CT lung screening     DEXA (modify frequency per FRAX score)     Pneumococcal 65+ years Vaccine (2 of 2 - PPSV23)    Flu vaccine (Season Ended)    Potassium monitoring     Creatinine monitoring     Lipid screen

## 2019-04-10 NOTE — PATIENT INSTRUCTIONS
Patient Education        Chronic Obstructive Pulmonary Disease (COPD): Care Instructions  Your Care Instructions    Chronic obstructive pulmonary disease (COPD) is a general term for a group of lung diseases, including emphysema and chronic bronchitis. People with COPD have decreased airflow in and out of the lungs, which makes it hard to breathe. The airways also can get clogged with thick mucus. Cigarette smoking is a major cause of COPD. Although there is no cure for COPD, you can slow its progress. Following your treatment plan and taking care of yourself can help you feel better and live longer. Follow-up care is a key part of your treatment and safety. Be sure to make and go to all appointments, and call your doctor if you are having problems. It's also a good idea to know your test results and keep a list of the medicines you take. How can you care for yourself at home?   Staying healthy    · Do not smoke. This is the most important step you can take to prevent more damage to your lungs. If you need help quitting, talk to your doctor about stop-smoking programs and medicines. These can increase your chances of quitting for good.     · Avoid colds and flu. Get a pneumococcal vaccine shot. If you have had one before, ask your doctor whether you need a second dose. Get the flu vaccine every fall. If you must be around people with colds or the flu, wash your hands often.     · Avoid secondhand smoke, air pollution, and high altitudes. Also avoid cold, dry air and hot, humid air. Stay at home with your windows closed when air pollution is bad.    Medicines and oxygen therapy    · Take your medicines exactly as prescribed. Call your doctor if you think you are having a problem with your medicine.     · You may be taking medicines such as:  ? Bronchodilators. These help open your airways and make breathing easier.  Bronchodilators are either short-acting (work for 6 to 9 hours) or long-acting (work for 25 chew.     · Eat foods that contain protein so that you do not lose muscle mass.     · Talk with your doctor if you gain too much weight or if you lose weight without trying.    Mental health    · Talk to your family, friends, or a therapist about your feelings. It is normal to feel frightened, angry, hopeless, helpless, and even guilty. Talking openly about bad feelings can help you cope. If these feelings last, talk to your doctor. When should you call for help? Call 911 anytime you think you may need emergency care. For example, call if:    · You have severe trouble breathing.    Call your doctor now or seek immediate medical care if:    · You have new or worse trouble breathing.     · You cough up blood.     · You have a fever.    Watch closely for changes in your health, and be sure to contact your doctor if:    · You cough more deeply or more often, especially if you notice more mucus or a change in the color of your mucus.     · You have new or worse swelling in your legs or belly.     · You are not getting better as expected. Where can you learn more? Go to https://hike.[employer] International. org and sign in to your Beacon Power account. Enter H009 in the Hapticom box to learn more about \"Chronic Obstructive Pulmonary Disease (COPD): Care Instructions. \"     If you do not have an account, please click on the \"Sign Up Now\" link. Current as of: September 5, 2018  Content Version: 11.9  © 7407-8802 [employer]. Care instructions adapted under license by [facility] ([facility] [place] If you have questions about a medical condition or this instruction, always ask your healthcare professional. [place] [address] any warranty or liability for your use of this information. Patient Education        High Blood Pressure: Care Instructions  Overview    It's normal for blood pressure to go up and down throughout the day. But if it stays up, you have high blood pressure.  Another name for high blood pressure is hypertension. Despite what a lot of people think, high blood pressure usually doesn't cause headaches or make you feel dizzy or lightheaded. It usually has no symptoms. But it does increase your risk of stroke, heart attack, and other problems. You and your doctor will talk about your risks of these problems based on your blood pressure. Your doctor will give you a goal for your blood pressure. Your goal will be based on your health and your age. Lifestyle changes, such as eating healthy and being active, are always important to help lower blood pressure. You might also take medicine to reach your blood pressure goal.  Follow-up care is a key part of your treatment and safety. Be sure to make and go to all appointments, and call your doctor if you are having problems. It's also a good idea to know your test results and keep a list of the medicines you take. How can you care for yourself at home? Medical treatment  · If you stop taking your medicine, your blood pressure will go back up. You may take one or more types of medicine to lower your blood pressure. Be safe with medicines. Take your medicine exactly as prescribed. Call your doctor if you think you are having a problem with your medicine. · Talk to your doctor before you start taking aspirin every day. Aspirin can help certain people lower their risk of a heart attack or stroke. But taking aspirin isn't right for everyone, because it can cause serious bleeding. · See your doctor regularly. You may need to see the doctor more often at first or until your blood pressure comes down. · If you are taking blood pressure medicine, talk to your doctor before you take decongestants or anti-inflammatory medicine, such as ibuprofen. Some of these medicines can raise blood pressure. · Learn how to check your blood pressure at home. Lifestyle changes  · Stay at a healthy weight.  This is especially important if you put on weight around the waist. Losing even 10 pounds can help you lower your blood pressure. · If your doctor recommends it, get more exercise. Walking is a good choice. Bit by bit, increase the amount you walk every day. Try for at least 30 minutes on most days of the week. You also may want to swim, bike, or do other activities. · Avoid or limit alcohol. Talk to your doctor about whether you can drink any alcohol. · Try to limit how much sodium you eat to less than 2,300 milligrams (mg) a day. Your doctor may ask you to try to eat less than 1,500 mg a day. · Eat plenty of fruits (such as bananas and oranges), vegetables, legumes, whole grains, and low-fat dairy products. · Lower the amount of saturated fat in your diet. Saturated fat is found in animal products such as milk, cheese, and meat. Limiting these foods may help you lose weight and also lower your risk for heart disease. · Do not smoke. Smoking increases your risk for heart attack and stroke. If you need help quitting, talk to your doctor about stop-smoking programs and medicines. These can increase your chances of quitting for good. When should you call for help? Call 911 anytime you think you may need emergency care. This may mean having symptoms that suggest that your blood pressure is causing a serious heart or blood vessel problem. Your blood pressure may be over 180/120.   For example, call 911 if:    · You have symptoms of a heart attack. These may include:  ? Chest pain or pressure, or a strange feeling in the chest.  ? Sweating. ? Shortness of breath. ? Nausea or vomiting. ? Pain, pressure, or a strange feeling in the back, neck, jaw, or upper belly or in one or both shoulders or arms. ? Lightheadedness or sudden weakness. ? A fast or irregular heartbeat.     · You have symptoms of a stroke. These may include:  ? Sudden numbness, tingling, weakness, or loss of movement in your face, arm, or leg, especially on only one side of your body.   ? Sudden vision changes. ? Sudden trouble speaking. ? Sudden confusion or trouble understanding simple statements. ? Sudden problems with walking or balance. ? A sudden, severe headache that is different from past headaches.     · You have severe back or belly pain.    Do not wait until your blood pressure comes down on its own. Get help right away.   Call your doctor now or seek immediate care if:    · Your blood pressure is much higher than normal (such as 180/120 or higher), but you don't have symptoms.     · You think high blood pressure is causing symptoms, such as:  ? Severe headache.  ? Blurry vision.    Watch closely for changes in your health, and be sure to contact your doctor if:    · Your blood pressure measures higher than your doctor recommends at least 2 times. That means the top number is higher or the bottom number is higher, or both.     · You think you may be having side effects from your blood pressure medicine. Where can you learn more? Go to https://Relay FoodspeWasatch Microfluidics.DBL Acquisition. org and sign in to your GroupCharger account. Enter U910 in the BrainCells box to learn more about \"High Blood Pressure: Care Instructions. \"     If you do not have an account, please click on the \"Sign Up Now\" link. Current as of: July 22, 2018  Content Version: 11.9  © 2593-4021 [employer] Incorporated. Care instructions adapted under license by [facility] ([facility]). If you have questions about a medical condition or this instruction, always ask your healthcare professional. [place] [address] any warranty or liability for your use of this information. Patient Education        Learning About High Cholesterol  What is high cholesterol? Cholesterol is a type of fat in your blood. It is needed for many body functions, such as making new cells. Cholesterol is made by your body. It also comes from food you eat. If you have too much cholesterol, it starts to build up in your arteries.  This is called hardening of the arteries, or atherosclerosis. High cholesterol raises your risk of a heart attack and stroke. There are different types of cholesterol. LDL is the \"bad\" cholesterol. High LDL can raise your risk for heart disease, heart attack, and stroke. HDL is the \"good\" cholesterol. High HDL is linked with a lower risk for heart disease, heart attack, and stroke. Your cholesterol levels help your doctor find out your risk for having a heart attack or stroke. How can you prevent high cholesterol? A heart-healthy lifestyle can help you prevent high cholesterol. This lifestyle helps lower your risk for a heart attack and stroke. · Eat heart-healthy foods. ? Eat fruits, vegetables, whole grains (like oatmeal), dried beans and peas, nuts and seeds, soy products (like tofu), and fat-free or low-fat dairy products. ? Replace butter, margarine, and hydrogenated or partially hydrogenated oils with olive and canola oils. (Canola oil margarine without trans fat is fine.)  ? Replace red meat with fish, poultry, and soy protein (like tofu). ? Limit processed and packaged foods like chips, crackers, and cookies. · Be active. Exercise can improve your cholesterol level. Get at least 30 minutes of exercise on most days of the week. Walking is a good choice. You also may want to do other activities, such as running, swimming, cycling, or playing tennis or team sports. · Stay at a healthy weight. Lose weight if you need to. · Don't smoke. If you need help quitting, talk to your doctor about stop-smoking programs and medicines. These can increase your chances of quitting for good. How is high cholesterol treated? The goal of treatment is to reduce your chances of having a heart attack or stroke. The goal is not to lower your cholesterol numbers only. · You may make lifestyle changes, such as eating healthy foods, not smoking, losing weight, and being more active. · You may have to take medicine.   Follow-up care is a key part of your treatment and safety. Be sure to make and go to all appointments, and call your doctor if you are having problems. It's also a good idea to know your test results and keep a list of the medicines you take. Where can you learn more? Go to https://chpepiceweb.Verafin. org and sign in to your [employer] account. Enter U844 in the WSP Global box to learn more about \"Learning About High Cholesterol. \"     If you do not have an account, please click on the \"Sign Up Now\" link. Current as of: July 22, 2018  Content Version: 11.9  © 2707-4306 [employer] Incorporated. Care instructions adapted under license by [facility] ([facility]). If you have questions about a medical condition or this instruction, always ask your healthcare professional. [address] any warranty or liability for your use of this information.

## 2019-05-03 ENCOUNTER — OFFICE VISIT (OUTPATIENT)
Dept: FAMILY MEDICINE CLINIC | Age: 74
End: 2019-05-03
Payer: MEDICARE

## 2019-05-03 VITALS
OXYGEN SATURATION: 92 % | RESPIRATION RATE: 20 BRPM | DIASTOLIC BLOOD PRESSURE: 84 MMHG | HEIGHT: 57 IN | SYSTOLIC BLOOD PRESSURE: 124 MMHG | WEIGHT: 163 LBS | HEART RATE: 85 BPM | TEMPERATURE: 98.1 F | BODY MASS INDEX: 35.17 KG/M2

## 2019-05-03 DIAGNOSIS — I10 ESSENTIAL HYPERTENSION: ICD-10-CM

## 2019-05-03 DIAGNOSIS — R73.01 IFG (IMPAIRED FASTING GLUCOSE): ICD-10-CM

## 2019-05-03 DIAGNOSIS — I63.9 CEREBROVASCULAR ACCIDENT (CVA), UNSPECIFIED MECHANISM (HCC): ICD-10-CM

## 2019-05-03 DIAGNOSIS — Z12.11 SCREEN FOR COLON CANCER: ICD-10-CM

## 2019-05-03 DIAGNOSIS — R53.83 FATIGUE, UNSPECIFIED TYPE: ICD-10-CM

## 2019-05-03 DIAGNOSIS — E78.2 MIXED HYPERLIPIDEMIA: ICD-10-CM

## 2019-05-03 DIAGNOSIS — J44.9 CHRONIC OBSTRUCTIVE PULMONARY DISEASE, UNSPECIFIED COPD TYPE (HCC): Primary | ICD-10-CM

## 2019-05-03 PROCEDURE — G8510 SCR DEP NEG, NO PLAN REQD: HCPCS | Performed by: FAMILY MEDICINE

## 2019-05-03 PROCEDURE — 3288F FALL RISK ASSESSMENT DOCD: CPT | Performed by: FAMILY MEDICINE

## 2019-05-03 PROCEDURE — 99214 OFFICE O/P EST MOD 30 MIN: CPT | Performed by: FAMILY MEDICINE

## 2019-05-03 PROCEDURE — 1036F TOBACCO NON-USER: CPT | Performed by: FAMILY MEDICINE

## 2019-05-03 PROCEDURE — G8599 NO ASA/ANTIPLAT THER USE RNG: HCPCS | Performed by: FAMILY MEDICINE

## 2019-05-03 PROCEDURE — G8427 DOCREV CUR MEDS BY ELIG CLIN: HCPCS | Performed by: FAMILY MEDICINE

## 2019-05-03 PROCEDURE — 4040F PNEUMOC VAC/ADMIN/RCVD: CPT | Performed by: FAMILY MEDICINE

## 2019-05-03 PROCEDURE — 3017F COLORECTAL CA SCREEN DOC REV: CPT | Performed by: FAMILY MEDICINE

## 2019-05-03 PROCEDURE — G8926 SPIRO NO PERF OR DOC: HCPCS | Performed by: FAMILY MEDICINE

## 2019-05-03 PROCEDURE — 1123F ACP DISCUSS/DSCN MKR DOCD: CPT | Performed by: FAMILY MEDICINE

## 2019-05-03 PROCEDURE — 3023F SPIROM DOC REV: CPT | Performed by: FAMILY MEDICINE

## 2019-05-03 PROCEDURE — G8400 PT W/DXA NO RESULTS DOC: HCPCS | Performed by: FAMILY MEDICINE

## 2019-05-03 PROCEDURE — 1090F PRES/ABSN URINE INCON ASSESS: CPT | Performed by: FAMILY MEDICINE

## 2019-05-03 PROCEDURE — G8417 CALC BMI ABV UP PARAM F/U: HCPCS | Performed by: FAMILY MEDICINE

## 2019-05-03 ASSESSMENT — ENCOUNTER SYMPTOMS
HOARSE VOICE: 0
FACIAL SWELLING: 0
SORE THROAT: 0
SHORTNESS OF BREATH: 1
DIFFICULTY BREATHING: 1
ALLERGIC/IMMUNOLOGIC NEGATIVE: 1
EYE DISCHARGE: 0
HEMOPTYSIS: 0
CONSTIPATION: 0
ABDOMINAL PAIN: 0
SINUS PAIN: 0
ANAL BLEEDING: 0
EYE ITCHING: 0
COLOR CHANGE: 0
ABDOMINAL DISTENTION: 0
RECTAL PAIN: 0
SPUTUM PRODUCTION: 0
RHINORRHEA: 0
ORTHOPNEA: 0
BLURRED VISION: 0
VOMITING: 0
CHEST TIGHTNESS: 0
TROUBLE SWALLOWING: 0
VOICE CHANGE: 0
COUGH: 0
HEARTBURN: 0
SWOLLEN GLANDS: 0
EYE REDNESS: 0
PHOTOPHOBIA: 0
CHOKING: 0
EYES NEGATIVE: 1
SINUS PRESSURE: 0
EYE PAIN: 0
BACK PAIN: 0
BLOOD IN STOOL: 0
APNEA: 0
NAUSEA: 0
WHEEZING: 0
FREQUENT THROAT CLEARING: 0
DIARRHEA: 0
STRIDOR: 0

## 2019-05-03 ASSESSMENT — PATIENT HEALTH QUESTIONNAIRE - PHQ9
1. LITTLE INTEREST OR PLEASURE IN DOING THINGS: 0
SUM OF ALL RESPONSES TO PHQ9 QUESTIONS 1 & 2: 0
2. FEELING DOWN, DEPRESSED OR HOPELESS: 0
SUM OF ALL RESPONSES TO PHQ QUESTIONS 1-9: 0
SUM OF ALL RESPONSES TO PHQ QUESTIONS 1-9: 0

## 2019-05-03 ASSESSMENT — COPD QUESTIONNAIRES: COPD: 1

## 2019-05-03 NOTE — PATIENT INSTRUCTIONS
Patient Education        Chronic Obstructive Pulmonary Disease (COPD): Care Instructions  Your Care Instructions    Chronic obstructive pulmonary disease (COPD) is a general term for a group of lung diseases, including emphysema and chronic bronchitis. People with COPD have decreased airflow in and out of the lungs, which makes it hard to breathe. The airways also can get clogged with thick mucus. Cigarette smoking is a major cause of COPD. Although there is no cure for COPD, you can slow its progress. Following your treatment plan and taking care of yourself can help you feel better and live longer. Follow-up care is a key part of your treatment and safety. Be sure to make and go to all appointments, and call your doctor if you are having problems. It's also a good idea to know your test results and keep a list of the medicines you take. How can you care for yourself at home?   Staying healthy    · Do not smoke. This is the most important step you can take to prevent more damage to your lungs. If you need help quitting, talk to your doctor about stop-smoking programs and medicines. These can increase your chances of quitting for good.     · Avoid colds and flu. Get a pneumococcal vaccine shot. If you have had one before, ask your doctor whether you need a second dose. Get the flu vaccine every fall. If you must be around people with colds or the flu, wash your hands often.     · Avoid secondhand smoke, air pollution, and high altitudes. Also avoid cold, dry air and hot, humid air. Stay at home with your windows closed when air pollution is bad.    Medicines and oxygen therapy    · Take your medicines exactly as prescribed. Call your doctor if you think you are having a problem with your medicine.     · You may be taking medicines such as:  ? Bronchodilators. These help open your airways and make breathing easier.  Bronchodilators are either short-acting (work for 6 to 9 hours) or long-acting (work for 25 hours). You inhale most bronchodilators, so they start to act quickly. Always carry your quick-relief inhaler with you in case you need it while you are away from home. ? Corticosteroids (prednisone, budesonide). These reduce airway inflammation. They come in pill or inhaled form. You must take these medicines every day for them to work well.     · A spacer may help you get more inhaled medicine to your lungs. Ask your doctor or pharmacist if a spacer is right for you. If it is, ask how to use it properly.     · Do not take any vitamins, over-the-counter medicine, or herbal products without talking to your doctor first.     · If your doctor prescribed antibiotics, take them as directed. Do not stop taking them just because you feel better. You need to take the full course of antibiotics.     · Oxygen therapy boosts the amount of oxygen in your blood and helps you breathe easier. Use the flow rate your doctor has recommended, and do not change it without talking to your doctor first.   Activity    · Get regular exercise. Walking is an easy way to get exercise. Start out slowly, and walk a little more each day.     · Pay attention to your breathing. You are exercising too hard if you cannot talk while you are exercising.     · Take short rest breaks when doing household chores and other activities.     · Learn breathing methods--such as breathing through pursed lips--to help you become less short of breath.     · If your doctor has not set you up with a pulmonary rehabilitation program, talk to him or her about whether rehab is right for you. Rehab includes exercise programs, education about your disease and how to manage it, help with diet and other changes, and emotional support. Diet    · Eat regular, healthy meals. Use bronchodilators about 1 hour before you eat to make it easier to eat. Eat several small meals instead of three large ones.  Drink beverages at the end of the meal. Avoid foods that are hard to chew.     · Eat foods that contain protein so that you do not lose muscle mass.     · Talk with your doctor if you gain too much weight or if you lose weight without trying.    Mental health    · Talk to your family, friends, or a therapist about your feelings. It is normal to feel frightened, angry, hopeless, helpless, and even guilty. Talking openly about bad feelings can help you cope. If these feelings last, talk to your doctor. When should you call for help? Call 911 anytime you think you may need emergency care. For example, call if:    · You have severe trouble breathing.    Call your doctor now or seek immediate medical care if:    · You have new or worse trouble breathing.     · You cough up blood.     · You have a fever.    Watch closely for changes in your health, and be sure to contact your doctor if:    · You cough more deeply or more often, especially if you notice more mucus or a change in the color of your mucus.     · You have new or worse swelling in your legs or belly.     · You are not getting better as expected. Where can you learn more? Go to https://UnyqepeMicromax Informatics.Vertical Health Solutions. org and sign in to your [employer] account. Enter A216 in the Pick a Student box to learn more about \"Chronic Obstructive Pulmonary Disease (COPD): Care Instructions. \"     If you do not have an account, please click on the \"Sign Up Now\" link. Current as of: September 5, 2018  Content Version: 11.9  © 6013-2559 [employer]. Care instructions adapted under license by [facility] ([facility]). If you have questions about a medical condition or this instruction, always ask your healthcare professional. [place] [address] any warranty or liability for your use of this information. Patient Education        Learning About High Cholesterol  What is high cholesterol? Cholesterol is a type of fat in your blood. It is needed for many body functions, such as making new cells.  Cholesterol is made by your body. It also comes from food you eat. If you have too much cholesterol, it starts to build up in your arteries. This is called hardening of the arteries, or atherosclerosis. High cholesterol raises your risk of a heart attack and stroke. There are different types of cholesterol. LDL is the \"bad\" cholesterol. High LDL can raise your risk for heart disease, heart attack, and stroke. HDL is the \"good\" cholesterol. High HDL is linked with a lower risk for heart disease, heart attack, and stroke. Your cholesterol levels help your doctor find out your risk for having a heart attack or stroke. How can you prevent high cholesterol? A heart-healthy lifestyle can help you prevent high cholesterol. This lifestyle helps lower your risk for a heart attack and stroke. · Eat heart-healthy foods. ? Eat fruits, vegetables, whole grains (like oatmeal), dried beans and peas, nuts and seeds, soy products (like tofu), and fat-free or low-fat dairy products. ? Replace butter, margarine, and hydrogenated or partially hydrogenated oils with olive and canola oils. (Canola oil margarine without trans fat is fine.)  ? Replace red meat with fish, poultry, and soy protein (like tofu). ? Limit processed and packaged foods like chips, crackers, and cookies. · Be active. Exercise can improve your cholesterol level. Get at least 30 minutes of exercise on most days of the week. Walking is a good choice. You also may want to do other activities, such as running, swimming, cycling, or playing tennis or team sports. · Stay at a healthy weight. Lose weight if you need to. · Don't smoke. If you need help quitting, talk to your doctor about stop-smoking programs and medicines. These can increase your chances of quitting for good. How is high cholesterol treated? The goal of treatment is to reduce your chances of having a heart attack or stroke. The goal is not to lower your cholesterol numbers only.   · You may make lifestyle changes, such as eating healthy foods, not smoking, losing weight, and being more active. · You may have to take medicine. Follow-up care is a key part of your treatment and safety. Be sure to make and go to all appointments, and call your doctor if you are having problems. It's also a good idea to know your test results and keep a list of the medicines you take. Where can you learn more? Go to https://FlimmerpeMozeseb.Spotistic. org and sign in to your Jumbas account. Enter T058 in the E96 box to learn more about \"Learning About High Cholesterol. \"     If you do not have an account, please click on the \"Sign Up Now\" link. Current as of: July 22, 2018  Content Version: 11.9  © 2553-8474 [employer]kvite, Incorporated. Care instructions adapted under license by [facility] ([facility]). If you have questions about a medical condition or this instruction, always ask your healthcare professional. [place] [address] any warranty or liability for your use of this information.

## 2019-05-03 NOTE — PROGRESS NOTES
[facility] is a 68 y.o. female. HPI/Chief C/O:  Chief Complaint   Patient presents with    Hypertension     Pt here for two week follow up on new HTN meds - denies any side effects    Shortness of Breath     [facility] also [facility] - would like Rx sent to pharmacy to see how much it will cost - pt has no secondary or part D coverage     No Known Allergies  The patient is here for a medication list and treatment planning review  We will go over our care planning goals as well as take care of all refills  We will set up labs as well   Breathing much better with the Trelegy     Hypertension   This is a chronic problem. The current episode started more than 1 year ago. The problem is uncontrolled. Associated symptoms include anxiety, malaise/fatigue and shortness of breath. Pertinent negatives include no blurred vision, chest pain, headaches, neck pain, orthopnea, palpitations, peripheral edema, PND or sweats. Risk factors for coronary artery disease include post-menopausal state, smoking/tobacco exposure and family history. Past treatments include lifestyle changes, diuretics, beta blockers, calcium channel blockers and ACE inhibitors. The current treatment provides significant improvement. Compliance problems include diet, exercise and psychosocial issues. Hypertensive end-organ damage includes CVA. There is no history of angina, kidney disease, CAD/MI, heart failure, left ventricular hypertrophy, PVD or retinopathy. There is no history of chronic renal disease, coarctation of the aorta, hyperaldosteronism, hypercortisolism, hyperparathyroidism, a hypertension causing med, pheochromocytoma, renovascular disease, sleep apnea or a thyroid problem. Shortness of Breath   This is a chronic problem. The current episode started more than 1 year ago. The problem occurs constantly. The problem has been gradually improving.  Pertinent negatives include no abdominal pain, chest pain, claudication, coryza, ear pain, fever, headaches, hemoptysis, leg pain, leg swelling, neck pain, orthopnea, PND, rash, rhinorrhea, sore throat, sputum production, swollen glands, syncope, vomiting or wheezing. She has tried steroid inhalers, ipratropium inhalers, oral steroids and beta agonist inhalers for the symptoms. The treatment provided significant relief. Her past medical history is significant for COPD. There is no history of allergies, aspirin allergies, asthma, bronchiolitis, CAD, DVT, a heart failure, PE, pneumonia or a recent surgery. COPD   She complains of difficulty breathing and shortness of breath. There is no chest tightness, cough, frequent throat clearing, hemoptysis, hoarse voice, sputum production or wheezing. This is a chronic problem. The current episode started more than 1 month ago. The problem occurs constantly. The problem has been gradually worsening. Associated symptoms include malaise/fatigue. Pertinent negatives include no appetite change, chest pain, ear congestion, ear pain, fever, headaches, heartburn, myalgias, nasal congestion, orthopnea, PND, postnasal drip, rhinorrhea, sneezing, sore throat, sweats, trouble swallowing or weight loss. Her symptoms are aggravated by any activity. Her past medical history is significant for COPD. There is no history of asthma, bronchiectasis, bronchitis or pneumonia. ROS:  Review of Systems   Constitutional: Positive for fatigue and malaise/fatigue. Negative for activity change, appetite change, chills, diaphoresis, fever, unexpected weight change and weight loss. HENT: Negative. Negative for congestion, dental problem, drooling, ear discharge, ear pain, facial swelling, hearing loss, hoarse voice, mouth sores, nosebleeds, postnasal drip, rhinorrhea, sinus pressure, sinus pain, sneezing, sore throat, tinnitus, trouble swallowing and voice change. Eyes: Negative.   Negative for blurred vision, photophobia, pain, discharge, redness, itching and visual disturbance. Respiratory: Positive for shortness of breath. Negative for apnea, cough, hemoptysis, sputum production, choking, chest tightness, wheezing and stridor. Cardiovascular: Negative. Negative for chest pain, palpitations, orthopnea, claudication, leg swelling, syncope and PND. Gastrointestinal: Negative for abdominal distention, abdominal pain, anal bleeding, blood in stool, constipation, diarrhea, heartburn, nausea, rectal pain and vomiting. Endocrine: Negative. Negative for cold intolerance, heat intolerance, polydipsia, polyphagia and polyuria. Genitourinary: Negative. Negative for decreased urine volume, difficulty urinating, dysuria, flank pain, frequency, genital sores, hematuria, menstrual problem, pelvic pain and urgency. Musculoskeletal: Positive for arthralgias. Negative for back pain, gait problem, joint swelling, myalgias, neck pain and neck stiffness. Skin: Negative. Negative for color change, pallor, rash and wound. Allergic/Immunologic: Negative. Neurological: Positive for weakness. Negative for dizziness, tremors, seizures, syncope, facial asymmetry, speech difficulty, light-headedness, numbness and headaches. Hematological: Negative. Negative for adenopathy. Does not bruise/bleed easily. Psychiatric/Behavioral: Negative for agitation, behavioral problems, confusion, decreased concentration, dysphoric mood, hallucinations, self-injury, sleep disturbance and suicidal ideas. The patient is nervous/anxious. The patient is not hyperactive.          Past Medical/Surgical Hx;  Reviewed with patient      Diagnosis Date    Hypertension     [facility]roke [facility])      Past Surgical History:   Procedure Laterality Date    TONSILLECTOMY         Past Family Hx:  Reviewed with patient      Problem Relation Age of Onset    High Blood Pressure Mother     Other Father     High Blood Pressure Father        Social Hx:  Reviewed with patient  Social History     Tobacco Use    Smoking status: Former Smoker     Packs/day: 2.00     Years: 54.00     Pack years: 108.00     Types: Cigarettes     Start date: 1963     Last attempt to quit: 2017     Years since quittin.6    Smokeless tobacco: Never Used   Substance Use Topics    Alcohol use: Yes       OBJECTIVE  /84   Pulse 85   Temp 98.1 °F (36.7 °C) (Temporal)   Resp 20   Ht 4' 9\" (1.448 m)   Wt 163 lb (73.9 kg)   LMP  (LMP Unknown)   SpO2 92%   Breastfeeding? No   BMI 35.27 kg/m²     Problem List:  Sara Willson does not have any pertinent problems on file. PHYS EX:  Physical Exam   Constitutional: She is oriented to person, place, and time. She appears well-developed and well-nourished. No distress. She is not intubated. HENT:   Head: Normocephalic and atraumatic. Right Ear: External ear normal.   Left Ear: External ear normal.   Nose: Nose normal.   Mouth/Throat: Oropharynx is clear and moist. No oropharyngeal exudate. Eyes: Pupils are equal, round, and reactive to light. Conjunctivae and EOM are normal. Right eye exhibits no discharge. Left eye exhibits no discharge. No scleral icterus. Neck: Normal range of motion. Neck supple. No JVD present. No tracheal deviation present. No thyromegaly present. Cardiovascular: Normal rate, regular rhythm, normal heart sounds and intact distal pulses. Exam reveals no gallop and no friction rub. No murmur heard. Pulmonary/Chest: Effort normal. No accessory muscle usage or stridor. No apnea, no tachypnea and no bradypnea. She is not intubated. No respiratory distress. She has decreased breath sounds in the right upper field, the right middle field, the right lower field, the left upper field, the left middle field and the left lower field. She has no wheezes. She has no rhonchi. She has no rales. She exhibits no tenderness. Abdominal: Soft. Bowel sounds are normal. She exhibits no distension and no mass.  There is no tenderness. There is no rebound and no guarding. No hernia. Musculoskeletal: She exhibits tenderness. She exhibits no edema or deformity. Pain and decreased ROM multiple joints. Lymphadenopathy:     She has no cervical adenopathy. Neurological: She is alert and oriented to person, place, and time. She has normal reflexes. She displays normal reflexes. No cranial nerve deficit or sensory deficit. She exhibits normal muscle tone. Coordination normal.   Skin: Skin is warm. No rash noted. She is not diaphoretic. No erythema. No pallor. Nursing note and vitals reviewed. ASSESSMENT/PLAN  Compa was seen today for hypertension and shortness of breath. Diagnoses and all orders for this visit:    Chronic obstructive pulmonary disease, unspecified COPD type ([facility]  -     Fluticasone-Umeclidin-Vilant (Idelle Citrin) 100-62.5-25 MCG/INH AEPB; Inhale 100 mcg into the lungs daily  -     Basic Metabolic Panel; Future  -     CBC Auto Differential; Future  --PLAN--aerosol accuneb 1.25 plus chest percussion--Rx      Essential hypertension ---controlled   --patient is instructed on low to moderate sodium ( 2 to 2.5 grams ), daily    Also to increase potassium in the diet to about 3.5 grams daily    Literature is provided   ---CARDIAC---asa, ACE, BETA, statin, LASIX, ( CCB )  -     Basic Metabolic Panel; Future  -     CBC Auto Differential; Future    Mixed hyperlipidemia  -     Basic Metabolic Panel; Future  -     Lipid Panel; Future  -     CBC Auto Differential; Future  --Mediterranean diet, exercise, weight loss, vitamins    We have a long talk on cholesterol and importance of lowering it       Cerebrovascular accident (CVA), unspecified mechanism ([facility]  -     Basic Metabolic Panel;  Future  -     CBC Auto Differential; Future  ---VASCULAR PANEL  A) asa, plavix, aggrenox  B) coumadin, pletal, tzd, statin  C) ACE, LASIX, FOLIC, CCB  D) cannikinumab, fish oils       IFG (impaired fasting glucose)  -     Basic Metabolic Panel; Future  -     CBC Auto Differential; Future  -     Hemoglobin A1C; Future    Fatigue, unspecified type  -     TSH without Reflex; Future  -     Basic Metabolic Panel; Future  -     CBC Auto Differential; Future    Screen for colon cancer  -     POCT Fecal Immunochemical Test (FIT); Future        Outpatient Encounter Medications as of 5/3/2019   Medication Sig Dispense Refill    Fluticasone-Umeclidin-Vilant (TRELEGY ELLIPTA) 100-62.5-25 MCG/INH AEPB Inhale 100 mcg into the lungs daily 3 each 3    sucralfate (CARAFATE) 1 GM tablet Take 1 tablet by mouth 4 times daily 120 tablet 5    amLODIPine-benazepril (LOTREL) 5-20 MG per capsule Take one at supper 90 capsule 1    furosemide (LASIX) 40 MG tablet TAKE ONE TABLET BY MOUTH EVERY DAY 90 tablet 1    metoprolol tartrate (LOPRESSOR) 50 MG tablet TAKE ONE TABLET BY MOUTH TWO TIMES A  tablet 1    ferrous sulfate 325 (65 Fe) MG tablet TAKE ONE TABLET BY MOUTH TWO TIMES A DAY  0    guaiFENesin (ROBITUSSIN) 100 MG/5ML liquid TAKE 10 MLS BY MOUTH TWICE DAILY FOR 7 DAYS. 422 mL 0    folic acid (FOLVITE) 1 MG tablet TAKE ONE TABLET BY MOUTH EVERY DAY 90 tablet 1    vitamin B-1 (THIAMINE) 100 MG tablet TAKE ONE TABLET BY MOUTH DAILY 90 tablet 1    [DISCONTINUED] Fluticasone-Umeclidin-Vilant (TRELEGY ELLIPTA) 100-62.5-25 MCG/INH AEPB Inhale 100 mcg into the lungs daily 1 each 0     No facility-administered encounter medications on file as of 5/3/2019. No follow-ups on file.         Reviewed recent labs related to Raheem current problems      Discussed importance of regular Health Maintenance follow up  Health Maintenance   Topic    Hepatitis C screen     DTaP/Tdap/Td vaccine (1 - Tdap)    Breast cancer screen     Shingles Vaccine (1 of 2)    Colon cancer screen colonoscopy     Low dose CT lung screening     DEXA (modify frequency per FRAX score)     Pneumococcal 65+ years Vaccine (2 of 2 - PPSV23)    Flu vaccine (Season Ended)  Potassium monitoring     Creatinine monitoring     Lipid screen

## 2019-09-17 ENCOUNTER — HOSPITAL ENCOUNTER (OUTPATIENT)
Age: 74
Discharge: HOME | End: 2019-09-19
Payer: MEDICARE

## 2019-09-17 ENCOUNTER — OFFICE VISIT (OUTPATIENT)
Dept: FAMILY MEDICINE CLINIC | Age: 74
End: 2019-09-17
Payer: MEDICARE

## 2019-09-17 VITALS
BODY MASS INDEX: 34.43 KG/M2 | OXYGEN SATURATION: 95 % | HEIGHT: 58 IN | WEIGHT: 164 LBS | SYSTOLIC BLOOD PRESSURE: 122 MMHG | DIASTOLIC BLOOD PRESSURE: 82 MMHG | TEMPERATURE: 97.7 F | HEART RATE: 74 BPM

## 2019-09-17 DIAGNOSIS — Z87.891 PERSONAL HISTORY OF TOBACCO USE: ICD-10-CM

## 2019-09-17 DIAGNOSIS — Z12.11 SCREEN FOR COLON CANCER: ICD-10-CM

## 2019-09-17 DIAGNOSIS — J44.9 CHRONIC OBSTRUCTIVE PULMONARY DISEASE, UNSPECIFIED COPD TYPE (HCC): ICD-10-CM

## 2019-09-17 DIAGNOSIS — E78.2 MIXED HYPERLIPIDEMIA: ICD-10-CM

## 2019-09-17 DIAGNOSIS — Z00.00 ENCOUNTER FOR MEDICARE ANNUAL WELLNESS EXAM: Primary | ICD-10-CM

## 2019-09-17 DIAGNOSIS — Z12.31 SCREENING MAMMOGRAM, ENCOUNTER FOR: ICD-10-CM

## 2019-09-17 DIAGNOSIS — Z00.00 ROUTINE GENERAL MEDICAL EXAMINATION AT A HEALTH CARE FACILITY: ICD-10-CM

## 2019-09-17 DIAGNOSIS — I10 ESSENTIAL HYPERTENSION: ICD-10-CM

## 2019-09-17 DIAGNOSIS — I63.9 CEREBROVASCULAR ACCIDENT (CVA), UNSPECIFIED MECHANISM (HCC): ICD-10-CM

## 2019-09-17 DIAGNOSIS — R73.01 IFG (IMPAIRED FASTING GLUCOSE): ICD-10-CM

## 2019-09-17 DIAGNOSIS — R53.83 FATIGUE, UNSPECIFIED TYPE: ICD-10-CM

## 2019-09-17 LAB
ANION GAP SERPL CALCULATED.3IONS-SCNC: 17 MMOL/L (ref 7–16)
BASOPHILS ABSOLUTE: 0.04 E9/L (ref 0–0.2)
BASOPHILS RELATIVE PERCENT: 0.8 % (ref 0–2)
BUN BLDV-MCNC: 10 MG/DL (ref 8–23)
CALCIUM SERPL-MCNC: 9.2 MG/DL (ref 8.6–10.2)
CHLORIDE BLD-SCNC: 91 MMOL/L (ref 98–107)
CHOLESTEROL, TOTAL: 215 MG/DL (ref 0–199)
CO2: 29 MMOL/L (ref 22–29)
CREAT SERPL-MCNC: 1 MG/DL (ref 0.5–1)
EOSINOPHILS ABSOLUTE: 0.16 E9/L (ref 0.05–0.5)
EOSINOPHILS RELATIVE PERCENT: 3.3 % (ref 0–6)
GFR AFRICAN AMERICAN: >60
GFR NON-AFRICAN AMERICAN: 54 ML/MIN/1.73
GLUCOSE BLD-MCNC: 86 MG/DL (ref 74–99)
HBA1C MFR BLD: 5.2 % (ref 4–5.6)
HCT VFR BLD CALC: 41.9 % (ref 34–48)
HDLC SERPL-MCNC: 59 MG/DL
HEMOGLOBIN: 13.2 G/DL (ref 11.5–15.5)
IMMATURE GRANULOCYTES #: 0.02 E9/L
IMMATURE GRANULOCYTES %: 0.4 % (ref 0–5)
LDL CHOLESTEROL CALCULATED: 103 MG/DL (ref 0–99)
LYMPHOCYTES ABSOLUTE: 1.56 E9/L (ref 1.5–4)
LYMPHOCYTES RELATIVE PERCENT: 32.5 % (ref 20–42)
MCH RBC QN AUTO: 34.2 PG (ref 26–35)
MCHC RBC AUTO-ENTMCNC: 31.5 % (ref 32–34.5)
MCV RBC AUTO: 108.5 FL (ref 80–99.9)
MONOCYTES ABSOLUTE: 0.6 E9/L (ref 0.1–0.95)
MONOCYTES RELATIVE PERCENT: 12.5 % (ref 2–12)
NEUTROPHILS ABSOLUTE: 2.42 E9/L (ref 1.8–7.3)
NEUTROPHILS RELATIVE PERCENT: 50.5 % (ref 43–80)
PDW BLD-RTO: 14 FL (ref 11.5–15)
PLATELET # BLD: 287 E9/L (ref 130–450)
PMV BLD AUTO: 10.5 FL (ref 7–12)
POTASSIUM SERPL-SCNC: 4.4 MMOL/L (ref 3.5–5)
RBC # BLD: 3.86 E12/L (ref 3.5–5.5)
SODIUM BLD-SCNC: 137 MMOL/L (ref 132–146)
TRIGL SERPL-MCNC: 264 MG/DL (ref 0–149)
TSH SERPL DL<=0.05 MIU/L-ACNC: 0.85 UIU/ML (ref 0.27–4.2)
VLDLC SERPL CALC-MCNC: 53 MG/DL
WBC # BLD: 4.8 E9/L (ref 4.5–11.5)

## 2019-09-17 PROCEDURE — 85025 COMPLETE CBC W/AUTO DIFF WBC: CPT

## 2019-09-17 PROCEDURE — 4040F PNEUMOC VAC/ADMIN/RCVD: CPT | Performed by: FAMILY MEDICINE

## 2019-09-17 PROCEDURE — 3017F COLORECTAL CA SCREEN DOC REV: CPT | Performed by: FAMILY MEDICINE

## 2019-09-17 PROCEDURE — 1123F ACP DISCUSS/DSCN MKR DOCD: CPT | Performed by: FAMILY MEDICINE

## 2019-09-17 PROCEDURE — 80048 BASIC METABOLIC PNL TOTAL CA: CPT

## 2019-09-17 PROCEDURE — 83036 HEMOGLOBIN GLYCOSYLATED A1C: CPT

## 2019-09-17 PROCEDURE — G0438 PPPS, INITIAL VISIT: HCPCS | Performed by: FAMILY MEDICINE

## 2019-09-17 PROCEDURE — G0296 VISIT TO DETERM LDCT ELIG: HCPCS | Performed by: FAMILY MEDICINE

## 2019-09-17 PROCEDURE — 84443 ASSAY THYROID STIM HORMONE: CPT

## 2019-09-17 PROCEDURE — G8599 NO ASA/ANTIPLAT THER USE RNG: HCPCS | Performed by: FAMILY MEDICINE

## 2019-09-17 PROCEDURE — 80061 LIPID PANEL: CPT

## 2019-09-17 ASSESSMENT — LIFESTYLE VARIABLES
HOW OFTEN DURING THE LAST YEAR HAVE YOU FAILED TO DO WHAT WAS NORMALLY EXPECTED FROM YOU BECAUSE OF DRINKING: 0
HOW OFTEN DURING THE LAST YEAR HAVE YOU HAD A FEELING OF GUILT OR REMORSE AFTER DRINKING: 0
AUDIT-C TOTAL SCORE: 6
HOW MANY STANDARD DRINKS CONTAINING ALCOHOL DO YOU HAVE ON A TYPICAL DAY: 1
AUDIT TOTAL SCORE: 6
HOW OFTEN DO YOU HAVE SIX OR MORE DRINKS ON ONE OCCASION: 1
HOW OFTEN DURING THE LAST YEAR HAVE YOU BEEN UNABLE TO REMEMBER WHAT HAPPENED THE NIGHT BEFORE BECAUSE YOU HAD BEEN DRINKING: 0
HAS A RELATIVE, FRIEND, DOCTOR, OR ANOTHER HEALTH PROFESSIONAL EXPRESSED CONCERN ABOUT YOUR DRINKING OR SUGGESTED YOU CUT DOWN: 0
HAVE YOU OR SOMEONE ELSE BEEN INJURED AS A RESULT OF YOUR DRINKING: 0
HOW OFTEN DO YOU HAVE A DRINK CONTAINING ALCOHOL: 4
HOW OFTEN DURING THE LAST YEAR HAVE YOU NEEDED AN ALCOHOLIC DRINK FIRST THING IN THE MORNING TO GET YOURSELF GOING AFTER A NIGHT OF HEAVY DRINKING: 0
HOW OFTEN DURING THE LAST YEAR HAVE YOU FOUND THAT YOU WERE NOT ABLE TO STOP DRINKING ONCE YOU HAD STARTED: 0

## 2019-09-17 ASSESSMENT — PATIENT HEALTH QUESTIONNAIRE - PHQ9
SUM OF ALL RESPONSES TO PHQ QUESTIONS 1-9: 0
SUM OF ALL RESPONSES TO PHQ QUESTIONS 1-9: 0

## 2019-09-17 NOTE — PATIENT INSTRUCTIONS
chew.     · Eat foods that contain protein so that you do not lose muscle mass.     · Talk with your doctor if you gain too much weight or if you lose weight without trying.    Mental health    · Talk to your family, friends, or a therapist about your feelings. It is normal to feel frightened, angry, hopeless, helpless, and even guilty. Talking openly about bad feelings can help you cope. If these feelings last, talk to your doctor. When should you call for help? Call 911 anytime you think you may need emergency care. For example, call if:    · You have severe trouble breathing.    Call your doctor now or seek immediate medical care if:    · You have new or worse trouble breathing.     · You cough up blood.     · You have a fever.    Watch closely for changes in your health, and be sure to contact your doctor if:    · You cough more deeply or more often, especially if you notice more mucus or a change in the color of your mucus.     · You have new or worse swelling in your legs or belly.     · You are not getting better as expected. Where can you learn more? Go to https://PenzatapeMatchMate.Me.iFLYER. org and sign in to your Twibingo account. Enter B693 in the [employer] box to learn more about \"Chronic Obstructive Pulmonary Disease (COPD): Care Instructions. \"     If you do not have an account, please click on the \"Sign Up Now\" link. Current as of: September 5, 2018  Content Version: 12.1  © 0704-4003 Healthwise, Incorporated. Care instructions adapted under license by [facility] ([facility]). If you have questions about a medical condition or this instruction, always ask your healthcare professional. [place] [id number] any warranty or liability for your use of this information. Personalized Preventive Plan for Sánchez Holm - 9/17/2019  Medicare offers a range of preventive health benefits.  Some of the tests and screenings are paid in full while other may be subject to a

## 2019-10-02 RX ORDER — ROSUVASTATIN CALCIUM 5 MG/1
5 TABLET, COATED ORAL NIGHTLY
Qty: 90 TABLET | Refills: 1 | Status: SHIPPED
Start: 2019-10-02 | End: 2021-07-02 | Stop reason: SDUPTHER

## 2021-05-17 DIAGNOSIS — I10 ESSENTIAL HYPERTENSION: ICD-10-CM

## 2021-05-17 RX ORDER — AMLODIPINE BESYLATE AND BENAZEPRIL HYDROCHLORIDE 5; 20 MG/1; MG/1
CAPSULE ORAL
Qty: 30 CAPSULE | Refills: 0 | Status: SHIPPED
Start: 2021-05-17 | End: 2021-07-02 | Stop reason: SDUPTHER

## 2021-05-17 RX ORDER — FUROSEMIDE 40 MG/1
TABLET ORAL
Qty: 30 TABLET | Refills: 0 | Status: SHIPPED
Start: 2021-05-17 | End: 2021-07-02 | Stop reason: SDUPTHER

## 2021-05-17 RX ORDER — METOPROLOL TARTRATE 50 MG/1
TABLET, FILM COATED ORAL
Qty: 60 TABLET | Refills: 0 | Status: SHIPPED
Start: 2021-05-17 | End: 2021-07-02 | Stop reason: SDUPTHER

## 2021-07-02 ENCOUNTER — OFFICE VISIT (OUTPATIENT)
Dept: FAMILY MEDICINE CLINIC | Age: 76
End: 2021-07-02
Payer: MEDICARE

## 2021-07-02 VITALS
WEIGHT: 161 LBS | RESPIRATION RATE: 24 BRPM | TEMPERATURE: 97.7 F | HEIGHT: 57 IN | HEART RATE: 96 BPM | OXYGEN SATURATION: 96 % | SYSTOLIC BLOOD PRESSURE: 162 MMHG | DIASTOLIC BLOOD PRESSURE: 100 MMHG | BODY MASS INDEX: 34.73 KG/M2

## 2021-07-02 DIAGNOSIS — R53.83 FATIGUE, UNSPECIFIED TYPE: ICD-10-CM

## 2021-07-02 DIAGNOSIS — R73.01 IFG (IMPAIRED FASTING GLUCOSE): ICD-10-CM

## 2021-07-02 DIAGNOSIS — E78.5 DYSLIPIDEMIA: ICD-10-CM

## 2021-07-02 DIAGNOSIS — K27.4 PEPTIC ULCER WITH HEMORRHAGE: ICD-10-CM

## 2021-07-02 DIAGNOSIS — Z12.31 SCREENING MAMMOGRAM, ENCOUNTER FOR: ICD-10-CM

## 2021-07-02 DIAGNOSIS — Z00.00 ENCOUNTER FOR MEDICARE ANNUAL WELLNESS EXAM: ICD-10-CM

## 2021-07-02 DIAGNOSIS — I10 ESSENTIAL HYPERTENSION: ICD-10-CM

## 2021-07-02 DIAGNOSIS — I63.9 CEREBROVASCULAR ACCIDENT (CVA), UNSPECIFIED MECHANISM (HCC): ICD-10-CM

## 2021-07-02 DIAGNOSIS — Z00.00 ENCOUNTER FOR MEDICARE ANNUAL WELLNESS EXAM: Primary | ICD-10-CM

## 2021-07-02 DIAGNOSIS — Z00.00 ROUTINE GENERAL MEDICAL EXAMINATION AT A HEALTH CARE FACILITY: ICD-10-CM

## 2021-07-02 DIAGNOSIS — Z12.11 SCREEN FOR COLON CANCER: ICD-10-CM

## 2021-07-02 DIAGNOSIS — J44.9 CHRONIC OBSTRUCTIVE PULMONARY DISEASE, UNSPECIFIED COPD TYPE (HCC): ICD-10-CM

## 2021-07-02 DIAGNOSIS — Z87.891 HISTORY OF SMOKING 30 OR MORE PACK YEARS: ICD-10-CM

## 2021-07-02 LAB
ALBUMIN SERPL-MCNC: 4.4 G/DL (ref 3.5–5.2)
ALP BLD-CCNC: 100 U/L (ref 35–104)
ALT SERPL-CCNC: 10 U/L (ref 0–32)
ANION GAP SERPL CALCULATED.3IONS-SCNC: 11 MMOL/L (ref 7–16)
AST SERPL-CCNC: 20 U/L (ref 0–31)
BASOPHILS ABSOLUTE: 0.03 E9/L (ref 0–0.2)
BASOPHILS RELATIVE PERCENT: 0.5 % (ref 0–2)
BILIRUB SERPL-MCNC: 0.6 MG/DL (ref 0–1.2)
BUN BLDV-MCNC: 11 MG/DL (ref 6–23)
CALCIUM SERPL-MCNC: 9.8 MG/DL (ref 8.6–10.2)
CHLORIDE BLD-SCNC: 87 MMOL/L (ref 98–107)
CHOLESTEROL, TOTAL: 251 MG/DL (ref 0–199)
CO2: 37 MMOL/L (ref 22–29)
CREAT SERPL-MCNC: 0.7 MG/DL (ref 0.5–1)
EOSINOPHILS ABSOLUTE: 0.04 E9/L (ref 0.05–0.5)
EOSINOPHILS RELATIVE PERCENT: 0.6 % (ref 0–6)
GFR AFRICAN AMERICAN: >60
GFR NON-AFRICAN AMERICAN: >60 ML/MIN/1.73
GLUCOSE BLD-MCNC: 128 MG/DL (ref 74–99)
HBA1C MFR BLD: 5.4 % (ref 4–5.6)
HCT VFR BLD CALC: 44.6 % (ref 34–48)
HDLC SERPL-MCNC: 78 MG/DL
HEMOGLOBIN: 14.2 G/DL (ref 11.5–15.5)
IMMATURE GRANULOCYTES #: 0.02 E9/L
IMMATURE GRANULOCYTES %: 0.3 % (ref 0–5)
LDL CHOLESTEROL CALCULATED: 152 MG/DL (ref 0–99)
LYMPHOCYTES ABSOLUTE: 0.8 E9/L (ref 1.5–4)
LYMPHOCYTES RELATIVE PERCENT: 12.2 % (ref 20–42)
MCH RBC QN AUTO: 32.6 PG (ref 26–35)
MCHC RBC AUTO-ENTMCNC: 31.8 % (ref 32–34.5)
MCV RBC AUTO: 102.3 FL (ref 80–99.9)
MONOCYTES ABSOLUTE: 0.53 E9/L (ref 0.1–0.95)
MONOCYTES RELATIVE PERCENT: 8.1 % (ref 2–12)
NEUTROPHILS ABSOLUTE: 5.16 E9/L (ref 1.8–7.3)
NEUTROPHILS RELATIVE PERCENT: 78.3 % (ref 43–80)
PDW BLD-RTO: 12.9 FL (ref 11.5–15)
PLATELET # BLD: 268 E9/L (ref 130–450)
PMV BLD AUTO: 11.1 FL (ref 7–12)
POTASSIUM SERPL-SCNC: 4.8 MMOL/L (ref 3.5–5)
RBC # BLD: 4.36 E12/L (ref 3.5–5.5)
SODIUM BLD-SCNC: 135 MMOL/L (ref 132–146)
TOTAL PROTEIN: 8.1 G/DL (ref 6.4–8.3)
TRIGL SERPL-MCNC: 105 MG/DL (ref 0–149)
TSH SERPL DL<=0.05 MIU/L-ACNC: 0.82 UIU/ML (ref 0.27–4.2)
URIC ACID, SERUM: 6.4 MG/DL (ref 2.4–5.7)
VLDLC SERPL CALC-MCNC: 21 MG/DL
WBC # BLD: 6.6 E9/L (ref 4.5–11.5)

## 2021-07-02 PROCEDURE — 4040F PNEUMOC VAC/ADMIN/RCVD: CPT | Performed by: FAMILY MEDICINE

## 2021-07-02 PROCEDURE — G0439 PPPS, SUBSEQ VISIT: HCPCS | Performed by: FAMILY MEDICINE

## 2021-07-02 PROCEDURE — 3017F COLORECTAL CA SCREEN DOC REV: CPT | Performed by: FAMILY MEDICINE

## 2021-07-02 PROCEDURE — 1123F ACP DISCUSS/DSCN MKR DOCD: CPT | Performed by: FAMILY MEDICINE

## 2021-07-02 RX ORDER — SPIRONOLACTONE 25 MG/1
25 TABLET ORAL 2 TIMES DAILY
Qty: 180 TABLET | Refills: 1 | Status: SHIPPED
Start: 2021-07-02 | End: 2022-03-28 | Stop reason: SDUPTHER

## 2021-07-02 RX ORDER — FOLIC ACID 1 MG/1
TABLET ORAL
Qty: 90 TABLET | Refills: 1 | Status: SHIPPED
Start: 2021-07-02 | End: 2022-03-28 | Stop reason: SDUPTHER

## 2021-07-02 RX ORDER — AMLODIPINE BESYLATE AND BENAZEPRIL HYDROCHLORIDE 5; 20 MG/1; MG/1
CAPSULE ORAL
Qty: 90 CAPSULE | Refills: 1 | Status: SHIPPED
Start: 2021-07-02 | End: 2022-03-28 | Stop reason: SDUPTHER

## 2021-07-02 RX ORDER — SUCRALFATE 1 G/1
1 TABLET ORAL 4 TIMES DAILY
Qty: 120 TABLET | Refills: 5 | Status: SHIPPED
Start: 2021-07-02 | End: 2022-03-28 | Stop reason: SDUPTHER

## 2021-07-02 RX ORDER — METOPROLOL TARTRATE 50 MG/1
TABLET, FILM COATED ORAL
Qty: 180 TABLET | Refills: 1 | Status: SHIPPED
Start: 2021-07-02 | End: 2022-03-28 | Stop reason: SDUPTHER

## 2021-07-02 RX ORDER — ROSUVASTATIN CALCIUM 5 MG/1
5 TABLET, COATED ORAL NIGHTLY
Qty: 90 TABLET | Refills: 1 | Status: SHIPPED
Start: 2021-07-02 | End: 2021-07-05

## 2021-07-02 RX ORDER — FLUTICASONE PROPIONATE 220 UG/1
2 AEROSOL, METERED RESPIRATORY (INHALATION) 2 TIMES DAILY
Qty: 1 INHALER | Refills: 3 | Status: SHIPPED
Start: 2021-07-02 | End: 2022-03-28 | Stop reason: SDUPTHER

## 2021-07-02 RX ORDER — FUROSEMIDE 40 MG/1
TABLET ORAL
Qty: 90 TABLET | Refills: 1 | Status: SHIPPED
Start: 2021-07-02 | End: 2022-03-28 | Stop reason: SDUPTHER

## 2021-07-02 ASSESSMENT — PATIENT HEALTH QUESTIONNAIRE - PHQ9
1. LITTLE INTEREST OR PLEASURE IN DOING THINGS: 1
SUM OF ALL RESPONSES TO PHQ QUESTIONS 1-9: 1
SUM OF ALL RESPONSES TO PHQ9 QUESTIONS 1 & 2: 1
2. FEELING DOWN, DEPRESSED OR HOPELESS: 0
SUM OF ALL RESPONSES TO PHQ QUESTIONS 1-9: 1
SUM OF ALL RESPONSES TO PHQ QUESTIONS 1-9: 1

## 2021-07-02 ASSESSMENT — LIFESTYLE VARIABLES
HOW OFTEN DURING THE LAST YEAR HAVE YOU BEEN UNABLE TO REMEMBER WHAT HAPPENED THE NIGHT BEFORE BECAUSE YOU HAD BEEN DRINKING: 0
HOW OFTEN DURING THE LAST YEAR HAVE YOU FAILED TO DO WHAT WAS NORMALLY EXPECTED FROM YOU BECAUSE OF DRINKING: 0
HOW OFTEN DURING THE LAST YEAR HAVE YOU HAD A FEELING OF GUILT OR REMORSE AFTER DRINKING: 0
AUDIT-C TOTAL SCORE: 5
HAVE YOU OR SOMEONE ELSE BEEN INJURED AS A RESULT OF YOUR DRINKING: 0
HOW OFTEN DURING THE LAST YEAR HAVE YOU FOUND THAT YOU WERE NOT ABLE TO STOP DRINKING ONCE YOU HAD STARTED: 0
HOW OFTEN DURING THE LAST YEAR HAVE YOU NEEDED AN ALCOHOLIC DRINK FIRST THING IN THE MORNING TO GET YOURSELF GOING AFTER A NIGHT OF HEAVY DRINKING: 0
HAS A RELATIVE, FRIEND, DOCTOR, OR ANOTHER HEALTH PROFESSIONAL EXPRESSED CONCERN ABOUT YOUR DRINKING OR SUGGESTED YOU CUT DOWN: 0
HOW MANY STANDARD DRINKS CONTAINING ALCOHOL DO YOU HAVE ON A TYPICAL DAY: 1
HOW OFTEN DO YOU HAVE A DRINK CONTAINING ALCOHOL: 4
HOW OFTEN DO YOU HAVE SIX OR MORE DRINKS ON ONE OCCASION: 0
AUDIT TOTAL SCORE: 5

## 2021-07-02 NOTE — PATIENT INSTRUCTIONS
Patient Education        High Blood Pressure: Care Instructions  Overview     It's normal for blood pressure to go up and down throughout the day. But if it stays up, you have high blood pressure. Another name for high blood pressure is hypertension. Despite what a lot of people think, high blood pressure usually doesn't cause headaches or make you feel dizzy or lightheaded. It usually has no symptoms. But it does increase your risk of stroke, heart attack, and other problems. You and your doctor will talk about your risks of these problems based on your blood pressure. Your doctor will give you a goal for your blood pressure. Your goal will be based on your health and your age. Lifestyle changes, such as eating healthy and being active, are always important to help lower blood pressure. You might also take medicine to reach your blood pressure goal.  Follow-up care is a key part of your treatment and safety. Be sure to make and go to all appointments, and call your doctor if you are having problems. It's also a good idea to know your test results and keep a list of the medicines you take. How can you care for yourself at home? Medical treatment  · If you stop taking your medicine, your blood pressure will go back up. You may take one or more types of medicine to lower your blood pressure. Be safe with medicines. Take your medicine exactly as prescribed. Call your doctor if you think you are having a problem with your medicine. · Talk to your doctor before you start taking aspirin every day. Aspirin can help certain people lower their risk of a heart attack or stroke. But taking aspirin isn't right for everyone, because it can cause serious bleeding. · See your doctor regularly. You may need to see the doctor more often at first or until your blood pressure comes down.   · If you are taking blood pressure medicine, talk to your doctor before you take decongestants or anti-inflammatory medicine, such as ibuprofen. Some of these medicines can raise blood pressure. · Learn how to check your blood pressure at home. Lifestyle changes  · Stay at a healthy weight. This is especially important if you put on weight around the waist. Losing even 10 pounds can help you lower your blood pressure. · If your doctor recommends it, get more exercise. Walking is a good choice. Bit by bit, increase the amount you walk every day. Try for at least 30 minutes on most days of the week. You also may want to swim, bike, or do other activities. · Avoid or limit alcohol. Talk to your doctor about whether you can drink any alcohol. · Try to limit how much sodium you eat to less than 2,300 milligrams (mg) a day. Your doctor may ask you to try to eat less than 1,500 mg a day. · Eat plenty of fruits (such as bananas and oranges), vegetables, legumes, whole grains, and low-fat dairy products. · Lower the amount of saturated fat in your diet. Saturated fat is found in animal products such as milk, cheese, and meat. Limiting these foods may help you lose weight and also lower your risk for heart disease. · Do not smoke. Smoking increases your risk for heart attack and stroke. If you need help quitting, talk to your doctor about stop-smoking programs and medicines. These can increase your chances of quitting for good. When should you call for help? Call 911  anytime you think you may need emergency care. This may mean having symptoms that suggest that your blood pressure is causing a serious heart or blood vessel problem. Your blood pressure may be over 180/120. For example, call 911 if:    · You have symptoms of a heart attack. These may include:  ? Chest pain or pressure, or a strange feeling in the chest.  ? Sweating. ? Shortness of breath. ? Nausea or vomiting. ? Pain, pressure, or a strange feeling in the back, neck, jaw, or upper belly or in one or both shoulders or arms. ? Lightheadedness or sudden weakness.   ? A fast or irregular heartbeat.     · You have symptoms of a stroke. These may include:  ? Sudden numbness, tingling, weakness, or loss of movement in your face, arm, or leg, especially on only one side of your body. ? Sudden vision changes. ? Sudden trouble speaking. ? Sudden confusion or trouble understanding simple statements. ? Sudden problems with walking or balance. ? A sudden, severe headache that is different from past headaches.     · You have severe back or belly pain. Do not wait until your blood pressure comes down on its own. Get help right away. Call your doctor now or seek immediate care if:    · Your blood pressure is much higher than normal (such as 180/120 or higher), but you don't have symptoms.     · You think high blood pressure is causing symptoms, such as:  ? Severe headache.  ? Blurry vision. Watch closely for changes in your health, and be sure to contact your doctor if:    · Your blood pressure measures higher than your doctor recommends at least 2 times. That means the top number is higher or the bottom number is higher, or both.     · You think you may be having side effects from your blood pressure medicine. Where can you learn more? Go to https://AIKO Biotechnologypepiceweb.SiteBrains. org and sign in to your FestEvo account. Enter V880 in the [employer] box to learn more about \"High Blood Pressure: Care Instructions. \"     If you do not have an account, please click on the \"Sign Up Now\" link. Current as of: August 31, 2020               Content Version: 12.9  © 2006-2021 Healthwise, [employer]. Care instructions adapted under license by [facility] ([facility]). If you have questions about a medical condition or this instruction, always ask your healthcare professional. [place] [address] any warranty or liability for your use of this information. Personalized Preventive Plan for Estephanie Ramirez - 7/2/2021  Medicare offers a range of preventive health benefits. Some of the tests and screenings are paid in full while other may be subject to a deductible, co-insurance, and/or copay. Some of these benefits include a comprehensive review of your medical history including lifestyle, illnesses that may run in your family, and various assessments and screenings as appropriate. After reviewing your medical record and screening and assessments performed today your provider may have ordered immunizations, labs, imaging, and/or referrals for you. A list of these orders (if applicable) as well as your Preventive Care list are included within your After Visit Summary for your review. Other Preventive Recommendations:    · A preventive eye exam performed by an eye specialist is recommended every 1-2 years to screen for glaucoma; cataracts, macular degeneration, and other eye disorders. · A preventive dental visit is recommended every 6 months. · Try to get at least 150 minutes of exercise per week or 10,000 steps per day on a pedometer . · Order or download the FREE \"Exercise & Physical Activity: Your Everyday Guide\" from [employer]. Call 1-609.200.2555 or search [employer] online. · You need 5664-5641 mg of calcium and 8278-4103 IU of vitamin D per day. It is possible to meet your calcium requirement with diet alone, but a vitamin D supplement is usually necessary to meet this goal.  · When exposed to the sun, use a sunscreen that protects against both UVA and UVB radiation with an SPF of 30 or greater. Reapply every 2 to 3 hours or after sweating, drying off with a towel, or swimming. · Always wear a seat belt when traveling in a car. Always wear a helmet when riding a bicycle or motorcycle.

## 2021-07-02 NOTE — PROGRESS NOTES
Medicare Annual Wellness Visit  Name: Herber Tong Date: 2021   MRN: <[id number]> Sex: Female   Age: 77 y.o. Ethnicity: Non-/Non    : 1945 Race: White      Vianne Form is here for Medicare AWV and Shortness of Breath    Screenings for behavioral, psychosocial and functional/safety risks, and cognitive dysfunction are all negative except as indicated below. These results, as well as other patient data from the [address] form, are documented in Flowsheets linked to this Encounter. No Known Allergies      Prior to Visit Medications    Medication Sig Taking?  Authorizing Provider   amLODIPine-benazepril (LOTREL) 5-20 MG per capsule Take one at supper Yes Jb Alvarado DO   metoprolol tartrate (LOPRESSOR) 50 MG tablet TAKE ONE TABLET BY MOUTH TWO TIMES A DAY Yes Nigel Alvarado DO   furosemide (LASIX) 40 MG tablet TAKE ONE TABLET BY MOUTH EVERY DAY Yes Jb Alvarado DO   rosuvastatin (CRESTOR) 5 MG tablet Take 1 tablet by mouth nightly Yes Nigel Alvarado DO   sucralfate (CARAFATE) 1 GM tablet Take 1 tablet by mouth 4 times daily Yes Nigel Alvarado DO   folic acid (FOLVITE) 1 MG tablet TAKE ONE TABLET BY MOUTH EVERY DAY Yes Jb Alvarado DO   spironolactone (ALDACTONE) 25 MG tablet Take 1 tablet by mouth 2 times daily Yes Nigel Alvarado DO   umeclidinium-vilanterol (ANORO ELLIPTA) 62.5-25 MCG/INH AEPB inhaler Inhale 1 puff into the lungs daily  Patient not taking: Reported on 2021  Jb Alvarado DO   ferrous sulfate 325 (65 Fe) MG tablet TAKE ONE TABLET BY MOUTH TWO TIMES A DAY  Patient not taking: Reported on 2021  Historical Provider, MD   vitamin B-1 (THIAMINE) 100 MG tablet TAKE ONE TABLET BY MOUTH DAILY  Patient not taking: Reported on 2021  Clay Veras DO         Past Medical History:   Diagnosis Date    Hypertension     Stroke [facility])        Past Surgical History: Procedure Laterality Date    TONSILLECTOMY           Family History   Problem Relation Age of Onset    High Blood Pressure Mother     Other Father     High Blood Pressure Father        CareTeam (Including outside providers/suppliers regularly involved in providing care):   Patient Care Team:  Alisha Moss DO as PCP - General (Family Medicine)  [facility]    Wt Readings from Last 3 Encounters:   07/02/21 161 lb (73 kg)   09/17/19 164 lb (74.4 kg)   05/03/19 163 lb (73.9 kg)     Vitals:    07/02/21 1433 07/02/21 1438   BP: (!) 180/110 (!) 162/100   Pulse: 96    Resp: 24    Temp:  97.7 °F (36.5 °C)   SpO2: (!) 52% 96%   Weight: 161 lb (73 kg)    Height: 4' 9\" (1.448 m)      Body mass index is 34.84 kg/m². Based upon direct observation of the patient, evaluation of cognition reveals recent and remote memory intact. General Appearance: alert and oriented to person, place and time, well-developed and well-nourished, in no acute distress  Skin: warm and dry, no rash or erythema  Head: normocephalic and atraumatic  Eyes: pupils equal, round, and reactive to light, extraocular eye movements intact, conjunctivae normal  ENT: tympanic membrane, external ear and ear canal normal bilaterally, oropharynx clear and moist with normal mucous membranes  Neck: neck supple and non tender without mass, no thyromegaly or thyroid nodules, no cervical lymphadenopathy   Pulmonary/Chest: clear to auscultation bilaterally- no wheezes, rales or rhonchi,, no respiratory distress.  Very distant breath sounds  Cardiovascular: normal rate, regular rhythm, normal S1 and S2, no murmurs, no gallops, intact distal pulses and no carotid bruits  Abdomen: soft, non-tender, non-distended, normal bowel sounds, no masses or organomegaly  Extremities: no cyanosis and no clubbing  Musculoskeletal: normal range of motion, no joint swelling, deformity or tenderness  Neurologic: gait and coordination normal and speech normal    Patient's complete Health Risk Assessment and screening values have been reviewed and are found in Flowsheets. The following problems were reviewed today and where indicated follow up appointments were made and/or referrals ordered. Positive Risk Factor Screenings with Interventions:            General Health and ACP:  General  In general, how would you say your health is?: (!) Poor  In the past 7 days, have you experienced any of the following? New or Increased Pain, New or Increased Fatigue, Loneliness, Social Isolation, Stress or Anger?: None of These  Do you get the social and emotional support that you need?: Yes  Do you have a Living Will?: (!) No  Advance Directives     [facility] Will ACP-Advance Directive ACP-Power of     Not on File Not on File Not on File Not on File      General Health Risk Interventions:  · she feels fine but is short of breath     Health Habits/Nutrition:  Health Habits/Nutrition  Do you exercise for at least 20 minutes 2-3 times per week?: (!) No  Have you lost any weight without trying in the past 3 months?: No  Do you eat only one meal per day?: No  Have you seen the dentist within the past year?: (!) No  Body mass index: (!) 34.84  Health Habits/Nutrition Interventions:  · no acute issues    Hearing/Vision:  No exam data present  Hearing/Vision  Do you or your family notice any trouble with your hearing that hasn't been managed with hearing aids?: No  Do you have difficulty driving, watching TV, or doing any of your daily activities because of your eyesight?: No  Have you had an eye exam within the past year?: (!) No  Hearing/Vision Interventions:  · no acute issues     ADL:  ADLs  In the past 7 days, did you need help from others to perform any of the following everyday activities?  Eating, dressing, grooming, bathing, toileting, or walking/balance?: None  In the past 7 days, did you need help from others to take care of any of the following? Laundry, housekeeping, banking/finances, shopping, telephone use, food preparation, transportation, or taking medications?: [employer], Shopping  ADL Interventions:  · Patient declines any further evaluation/treatment for this issue    Personalized Preventive Plan   Current Health Maintenance Status  Immunization History   Administered Date(s) Administered    Pneumococcal Conjugate 13-valent (Kenyetta Hernández) 12/12/2017        Health Maintenance   Topic Date Due    Hepatitis C screen  Never done    COVID-19 Vaccine (1) Never done    DTaP/Tdap/Td vaccine (1 - Tdap) Never done    Breast cancer screen  Never done    Shingles Vaccine (1 of 2) Never done    Colon cancer screen colonoscopy  Never done    DEXA (modify frequency per FRAX score)  Never done    Low dose CT lung screening  Never done    Pneumococcal 65+ years Vaccine (2 of 2 - PPSV23) 12/12/2018    Annual Wellness Visit (AWV)  Never done    Lipid screen  09/17/2020    Potassium monitoring  09/17/2020    Creatinine monitoring  09/17/2020    Flu vaccine (1) 09/01/2021    Hepatitis A vaccine  Aged Out    Hepatitis B vaccine  Aged Out    Hib vaccine  Aged Out    Meningococcal (ACWY) vaccine  Aged Out     Recommendations for [facility] Due: see orders and patient instructions/AVS.  . Recommended screening schedule for the next 5-10 years is provided to the patient in written form: see Patient Instructions/AVS.    Daron Curry was seen today for medicare awv and shortness of breath. Diagnoses and all orders for this visit:    Encounter for Medicare annual wellness exam  -     Comprehensive Metabolic Panel;  Future  -     CBC Auto Differential; Future    ---VASCULAR PANEL  A) ASA, plavix, aggrenox  B) coumadin, pletal, tzd, STATIN  C) ACE, LASIX, folic, CCB  D) cannikinumab, fish oils     ---CARDIAC---ASA, ACE, BETA, STATIN, ALDACTONE, ( CCB )    Essential hypertension  -     amLODIPine-benazepril (LOTREL) 5-20 MG per capsule; Take one at supper  -     metoprolol tartrate (LOPRESSOR) 50 MG tablet; TAKE ONE TABLET BY MOUTH TWO TIMES A DAY  -     furosemide (LASIX) 40 MG tablet; TAKE ONE TABLET BY MOUTH EVERY DAY  -     Comprehensive Metabolic Panel; Future  -     CBC Auto Differential; Future  -     spironolactone (ALDACTONE) 25 MG tablet; Take 1 tablet by mouth 2 times daily  --she has resistant hypertension and I will add Aldactone for aldosterone inhibition     Peptic ulcer with hemorrhage  -     sucralfate (CARAFATE) 1 GM tablet; Take 1 tablet by mouth 4 times daily  -     Comprehensive Metabolic Panel; Future  -     CBC Auto Differential; Future    Cerebrovascular accident (CVA), unspecified mechanism ([facility]  -     folic acid (FOLVITE) 1 MG tablet; TAKE ONE TABLET BY MOUTH EVERY DAY  -     Comprehensive Metabolic Panel; Future  -     CBC Auto Differential; Future    IFG (impaired fasting glucose)  -     Comprehensive Metabolic Panel; Future  -     CBC Auto Differential; Future  -     Hemoglobin A1C; Future    Dyslipidemia  -     rosuvastatin (CRESTOR) 5 MG tablet; Take 1 tablet by mouth nightly  -     Comprehensive Metabolic Panel; Future  -     Lipid Panel; Future  -     CBC Auto Differential; Future    Fatigue, unspecified type  -     TSH without Reflex; Future  -     Uric Acid; Future  -     Comprehensive Metabolic Panel;  Future  -     CBC Auto Differential; Future

## 2021-07-05 ENCOUNTER — CLINICAL DOCUMENTATION (OUTPATIENT)
Dept: FAMILY MEDICINE CLINIC | Age: 76
End: 2021-07-05

## 2021-07-05 RX ORDER — ROSUVASTATIN CALCIUM 10 MG/1
10 TABLET, COATED ORAL DAILY
Qty: 90 TABLET | Refills: 1
Start: 2021-07-05 | End: 2022-10-04 | Stop reason: SDUPTHER

## 2021-07-05 NOTE — PROGRESS NOTES
The ASCVD Risk score (Jacque Allred et al., 2013) failed to calculate for the following reasons:     The patient has a prior MI or stroke diagnosis

## 2021-07-14 NOTE — PROGRESS NOTES
Overdue results letter mailed to patient regarding cologuard order.   Electronically signed by Ramona Gill on 7/14/2021 at 8:08 AM

## 2022-03-28 ENCOUNTER — OFFICE VISIT (OUTPATIENT)
Dept: FAMILY MEDICINE CLINIC | Age: 77
End: 2022-03-28
Payer: MEDICARE

## 2022-03-28 VITALS
BODY MASS INDEX: 27.83 KG/M2 | DIASTOLIC BLOOD PRESSURE: 82 MMHG | RESPIRATION RATE: 18 BRPM | TEMPERATURE: 97.8 F | HEART RATE: 76 BPM | HEIGHT: 57 IN | SYSTOLIC BLOOD PRESSURE: 132 MMHG | WEIGHT: 129 LBS

## 2022-03-28 DIAGNOSIS — K27.4 PEPTIC ULCER WITH HEMORRHAGE: ICD-10-CM

## 2022-03-28 DIAGNOSIS — E78.5 DYSLIPIDEMIA: ICD-10-CM

## 2022-03-28 DIAGNOSIS — J44.9 CHRONIC OBSTRUCTIVE PULMONARY DISEASE, UNSPECIFIED COPD TYPE (HCC): Primary | ICD-10-CM

## 2022-03-28 DIAGNOSIS — I63.9 CEREBROVASCULAR ACCIDENT (CVA), UNSPECIFIED MECHANISM (HCC): ICD-10-CM

## 2022-03-28 DIAGNOSIS — I10 ESSENTIAL HYPERTENSION: ICD-10-CM

## 2022-03-28 DIAGNOSIS — R53.83 FATIGUE, UNSPECIFIED TYPE: ICD-10-CM

## 2022-03-28 DIAGNOSIS — R73.01 IFG (IMPAIRED FASTING GLUCOSE): ICD-10-CM

## 2022-03-28 PROCEDURE — 1090F PRES/ABSN URINE INCON ASSESS: CPT | Performed by: FAMILY MEDICINE

## 2022-03-28 PROCEDURE — 1036F TOBACCO NON-USER: CPT | Performed by: FAMILY MEDICINE

## 2022-03-28 PROCEDURE — G8400 PT W/DXA NO RESULTS DOC: HCPCS | Performed by: FAMILY MEDICINE

## 2022-03-28 PROCEDURE — G8427 DOCREV CUR MEDS BY ELIG CLIN: HCPCS | Performed by: FAMILY MEDICINE

## 2022-03-28 PROCEDURE — G8484 FLU IMMUNIZE NO ADMIN: HCPCS | Performed by: FAMILY MEDICINE

## 2022-03-28 PROCEDURE — 4040F PNEUMOC VAC/ADMIN/RCVD: CPT | Performed by: FAMILY MEDICINE

## 2022-03-28 PROCEDURE — 3023F SPIROM DOC REV: CPT | Performed by: FAMILY MEDICINE

## 2022-03-28 PROCEDURE — G8417 CALC BMI ABV UP PARAM F/U: HCPCS | Performed by: FAMILY MEDICINE

## 2022-03-28 PROCEDURE — 1123F ACP DISCUSS/DSCN MKR DOCD: CPT | Performed by: FAMILY MEDICINE

## 2022-03-28 PROCEDURE — 3017F COLORECTAL CA SCREEN DOC REV: CPT | Performed by: FAMILY MEDICINE

## 2022-03-28 PROCEDURE — 99214 OFFICE O/P EST MOD 30 MIN: CPT | Performed by: FAMILY MEDICINE

## 2022-03-28 RX ORDER — FOLIC ACID 1 MG/1
TABLET ORAL
Qty: 90 TABLET | Refills: 1 | Status: SHIPPED
Start: 2022-03-28 | End: 2022-09-30

## 2022-03-28 RX ORDER — SPIRONOLACTONE 25 MG/1
25 TABLET ORAL 2 TIMES DAILY
Qty: 180 TABLET | Refills: 1 | Status: SHIPPED
Start: 2022-03-28 | End: 2022-09-30

## 2022-03-28 RX ORDER — FUROSEMIDE 40 MG/1
TABLET ORAL
Qty: 90 TABLET | Refills: 1 | Status: SHIPPED
Start: 2022-03-28 | End: 2022-09-30

## 2022-03-28 RX ORDER — IPRATROPIUM BROMIDE AND ALBUTEROL SULFATE 2.5; .5 MG/3ML; MG/3ML
1 SOLUTION RESPIRATORY (INHALATION) EVERY 6 HOURS PRN
Qty: 360 ML | Refills: 1 | Status: SHIPPED
Start: 2022-03-28 | End: 2022-10-04 | Stop reason: SDUPTHER

## 2022-03-28 RX ORDER — FLUTICASONE PROPIONATE 220 UG/1
2 AEROSOL, METERED RESPIRATORY (INHALATION) 2 TIMES DAILY
Qty: 1 EACH | Refills: 5 | Status: SHIPPED | OUTPATIENT
Start: 2022-03-28 | End: 2023-03-28

## 2022-03-28 RX ORDER — METOPROLOL TARTRATE 50 MG/1
TABLET, FILM COATED ORAL
Qty: 180 TABLET | Refills: 1 | Status: SHIPPED
Start: 2022-03-28 | End: 2022-09-30

## 2022-03-28 RX ORDER — SUCRALFATE 1 G/1
1 TABLET ORAL 4 TIMES DAILY
Qty: 480 TABLET | Refills: 1 | Status: SHIPPED | OUTPATIENT
Start: 2022-03-28

## 2022-03-28 RX ORDER — AMLODIPINE BESYLATE AND BENAZEPRIL HYDROCHLORIDE 5; 20 MG/1; MG/1
CAPSULE ORAL
Qty: 90 CAPSULE | Refills: 1 | Status: SHIPPED
Start: 2022-03-28 | End: 2022-08-12

## 2022-03-28 SDOH — ECONOMIC STABILITY: FOOD INSECURITY: WITHIN THE PAST 12 MONTHS, YOU WORRIED THAT YOUR FOOD WOULD RUN OUT BEFORE YOU GOT MONEY TO BUY MORE.: NEVER TRUE

## 2022-03-28 SDOH — ECONOMIC STABILITY: FOOD INSECURITY: WITHIN THE PAST 12 MONTHS, THE FOOD YOU BOUGHT JUST DIDN'T LAST AND YOU DIDN'T HAVE MONEY TO GET MORE.: NEVER TRUE

## 2022-03-28 ASSESSMENT — ENCOUNTER SYMPTOMS
WHEEZING: 0
CHOKING: 0
SINUS PAIN: 0
SINUS PRESSURE: 0
GASTROINTESTINAL NEGATIVE: 1
COLOR CHANGE: 0
STRIDOR: 0
SORE THROAT: 0
APNEA: 0
NAUSEA: 0
EYE PAIN: 0
BLURRED VISION: 0
COUGH: 0
ANAL BLEEDING: 0
BACK PAIN: 0
FACIAL SWELLING: 0
CONSTIPATION: 0
BLOOD IN STOOL: 0
ABDOMINAL PAIN: 0
VOICE CHANGE: 0
PHOTOPHOBIA: 0
CHEST TIGHTNESS: 0
EYE REDNESS: 0
VOMITING: 0
DIARRHEA: 0
TROUBLE SWALLOWING: 0
ORTHOPNEA: 0
ABDOMINAL DISTENTION: 0
RHINORRHEA: 0
EYE ITCHING: 0
ALLERGIC/IMMUNOLOGIC NEGATIVE: 1
SHORTNESS OF BREATH: 1
EYE DISCHARGE: 0
RECTAL PAIN: 0

## 2022-03-28 ASSESSMENT — SOCIAL DETERMINANTS OF HEALTH (SDOH): HOW HARD IS IT FOR YOU TO PAY FOR THE VERY BASICS LIKE FOOD, HOUSING, MEDICAL CARE, AND HEATING?: NOT HARD AT ALL

## 2022-03-28 NOTE — PROGRESS NOTES
SHAREE Moss Cap is a 77 y.o. female. HPI/Chief C/O:  Chief Complaint   Patient presents with    Annual Exam    Discuss Medications     Patient is requesting albuterol nebulizer refill - not on med list.      No Known Allergies  The patient is here for a medication list and treatment planning review  We will go over our care planning goals as well as take care of all refills  We will set up labs as well     Hypertension  This is a chronic problem. The current episode started more than 1 year ago. The problem is controlled. Associated symptoms include malaise/fatigue and shortness of breath. Pertinent negatives include no anxiety, blurred vision, chest pain, headaches, neck pain, orthopnea, palpitations, peripheral edema, PND or sweats. Risk factors for coronary artery disease include post-menopausal state, smoking/tobacco exposure and dyslipidemia. Past treatments include lifestyle changes. The current treatment provides significant improvement. Compliance problems include diet and exercise. Hypertensive end-organ damage includes CVA. There is no history of angina, kidney disease, CAD/MI, heart failure, left ventricular hypertrophy, PVD or retinopathy. There is no history of chronic renal disease, coarctation of the aorta, hyperaldosteronism, hypercortisolism, hyperparathyroidism, a hypertension causing med, pheochromocytoma, renovascular disease, sleep apnea or a thyroid problem. ROS:  Review of Systems   Constitutional: Positive for fatigue and malaise/fatigue. Negative for activity change, appetite change, chills, diaphoresis, fever and unexpected weight change. HENT: Negative. Negative for congestion, dental problem, drooling, ear discharge, ear pain, facial swelling, hearing loss, mouth sores, nosebleeds, postnasal drip, rhinorrhea, sinus pressure, sinus pain, sneezing, sore throat, tinnitus, trouble swallowing and voice change.     Eyes: Negative for blurred vision, photophobia, pain, discharge, redness, itching and visual disturbance. Respiratory: Positive for shortness of breath. Negative for apnea, cough, choking, chest tightness, wheezing and stridor. Cardiovascular: Negative. Negative for chest pain, palpitations, orthopnea, leg swelling and PND. Gastrointestinal: Negative. Negative for abdominal distention, abdominal pain, anal bleeding, blood in stool, constipation, diarrhea, nausea, rectal pain and vomiting. Endocrine: Negative. Negative for cold intolerance, heat intolerance, polydipsia, polyphagia and polyuria. Genitourinary: Negative. Negative for decreased urine volume, difficulty urinating, dysuria, enuresis, flank pain, frequency, genital sores, hematuria and urgency. Musculoskeletal: Positive for arthralgias, gait problem and myalgias. Negative for back pain, joint swelling, neck pain and neck stiffness. Skin: Negative. Negative for color change, pallor, rash and wound. Allergic/Immunologic: Negative. Negative for environmental allergies, food allergies and immunocompromised state. Neurological: Positive for weakness and numbness. Negative for dizziness, tremors, seizures, syncope, facial asymmetry, speech difficulty, light-headedness and headaches. Hematological: Negative. Negative for adenopathy. Does not bruise/bleed easily. Psychiatric/Behavioral: Negative. Negative for agitation, behavioral problems, confusion, decreased concentration, dysphoric mood, hallucinations, self-injury, sleep disturbance and suicidal ideas. The patient is not nervous/anxious and is not hyperactive.          Past Medical/Surgical Hx;  Reviewed with patient      Diagnosis Date    Hypertension     Stroke [facility])      Past Surgical History:   Procedure Laterality Date    TONSILLECTOMY         Past Family Hx:  Reviewed with patient      Problem Relation Age of Onset    High Blood Pressure Mother     Other Father     High Blood Pressure Father        Social Hx:  Reviewed with patient  Social History     Tobacco Use    Smoking status: Former Smoker     Packs/day: 2.00     Years: 54.00     Pack years: 108.00     Types: Cigarettes     Start date: 1963     Quit date: 2017     Years since quittin.5    Smokeless tobacco: Never Used   Substance Use Topics    Alcohol use: Yes       OBJECTIVE  /82   Pulse 76   Temp 97.8 °F (36.6 °C)   Resp 18   Ht 4' 9\" (1.448 m)   Wt 129 lb (58.5 kg)   LMP  (LMP Unknown)   Breastfeeding No   BMI 27.92 kg/m²     Problem List:  [facility] does not have any pertinent problems on file. PHYS EX:  Physical Exam  Vitals and nursing note reviewed. Constitutional:       General: She is not in acute distress. Appearance: Normal appearance. She is well-developed. She is not ill-appearing, toxic-appearing or diaphoretic. HENT:      Head: Normocephalic and atraumatic. Right Ear: Ear canal and external ear normal. There is no impacted cerumen. Left Ear: External ear normal. There is no impacted cerumen. Nose: Nose normal. No congestion or rhinorrhea. Mouth/Throat:      Mouth: Mucous membranes are moist.      Pharynx: No oropharyngeal exudate or posterior oropharyngeal erythema. Eyes:      General: No scleral icterus. Right eye: No discharge. Left eye: No discharge. Extraocular Movements: Extraocular movements intact. Conjunctiva/sclera: Conjunctivae normal.      Pupils: Pupils are equal, round, and reactive to light. Neck:      Thyroid: No thyromegaly. Vascular: No carotid bruit or JVD. Trachea: No tracheal deviation. Cardiovascular:      Rate and Rhythm: Normal rate and regular rhythm. Pulses: Normal pulses. Heart sounds: Normal heart sounds. No murmur heard. No friction rub. No gallop. Pulmonary:      Effort: Pulmonary effort is normal. No respiratory distress. Breath sounds: Normal breath sounds. No stridor.  No wheezing, rhonchi or rales.   Chest:      Chest wall: No tenderness. Abdominal:      General: Bowel sounds are normal. There is no distension. Palpations: Abdomen is soft. There is no mass. Tenderness: There is no abdominal tenderness. There is no right CVA tenderness, left CVA tenderness, guarding or rebound. Hernia: No hernia is present. Musculoskeletal:         General: Tenderness (TO MULTIPLE JOINTS) present. No swelling, deformity or signs of injury. Normal range of motion. Cervical back: Normal range of motion and neck supple. No rigidity. No muscular tenderness. Right lower leg: No edema. Left lower leg: No edema. Comments: Uses a walker    Lymphadenopathy:      Cervical: No cervical adenopathy. Skin:     General: Skin is warm. Coloration: Skin is not jaundiced or pale. Findings: No bruising, erythema, lesion or rash. Neurological:      General: No focal deficit present. Mental Status: She is alert and oriented to person, place, and time. Cranial Nerves: No cranial nerve deficit. Sensory: Sensory deficit present. Motor: Weakness present. No abnormal muscle tone. Coordination: Coordination abnormal.      Gait: Gait abnormal.      Deep Tendon Reflexes: Reflexes abnormal.         ASSESSMENT/PLAN  Compa was seen today for annual exam and discuss medications. Diagnoses and all orders for this visit:    Chronic obstructive pulmonary disease, unspecified [facility]  -     Comprehensive Metabolic Panel; Future  -     CBC with Auto Differential; Future  --PLAN--aerosol accuneb 1.25 plus chest percussion--Rx      Essential hypertension ---controlled   --patient is instructed on low to moderate sodium ( 2 to 2.5 grams ), daily    Also to increase potassium in the diet to about 3.5 grams daily    Literature is provided     -     spironolactone (ALDACTONE) 25 MG tablet;  Take 1 tablet by mouth 2 times daily  -     metoprolol tartrate (LOPRESSOR) 50 MG tablet; TAKE ONE TABLET BY MOUTH TWO TIMES A DAY  -     amLODIPine-benazepril (LOTREL) 5-20 MG per capsule; Take one at supper  -     furosemide (LASIX) 40 MG tablet; TAKE ONE TABLET BY MOUTH EVERY DAY  -     Comprehensive Metabolic Panel; Future  -     CBC with Auto Differential; Future    Cerebrovascular accident (CVA), unspecified mechanism ([facility]  -     folic acid (FOLVITE) 1 MG tablet; TAKE ONE TABLET BY MOUTH EVERY DAY  -     Comprehensive Metabolic Panel; Future  -     CBC with Auto Differential; Future    ---VASCULAR PANEL  A) asa, plavix, aggrenox  B) coumadin, pletal, tzd, STATIN  C) ACE, ALDACTONE, FOLIC, CCB  D) cannikinumab, fish oils     ---CARDIAC---asa, ACE, BETA, STATIN, LASIX, ( CCB )    Peptic ulcer with hemorrhage  -     sucralfate (CARAFATE) 1 GM tablet; Take 1 tablet by mouth 4 times daily  -     Comprehensive Metabolic Panel; Future  -     CBC with Auto Differential; Future    IFG (impaired fasting glucose)  -     Comprehensive Metabolic Panel; Future  -     CBC with Auto Differential; Future    Fatigue, unspecified type  -     TSH; Future  -     Uric Acid; Future  -     Comprehensive Metabolic Panel; Future  -     CBC with Auto Differential; Future  Long talk on treatment and prevention  Literature is given       Dyslipidemia  -     Lipid Panel; Future  -     CBC with Auto Differential; Future  --Mediterranean diet, exercise, weight loss, vitamins    We have a long talk on cholesterol and importance of lowering it       Other orders  -     fluticasone (FLOVENT HFA) 220 MCG/ACT inhaler; Inhale 2 puffs into the lungs 2 times daily  -     ipratropium-albuterol (DUONEB) 0.5-2.5 (3) MG/3ML SOLN nebulizer solution;  Inhale 3 mLs into the lungs every 6 hours as needed for Shortness of Breath        Outpatient Encounter Medications as of 3/28/2022   Medication Sig Dispense Refill    fluticasone (FLOVENT HFA) 220 MCG/ACT inhaler Inhale 2 puffs into the lungs 2 times daily 1 each 5    spironolactone (ALDACTONE) 25 MG tablet Take 1 tablet by mouth 2 times daily 180 tablet 1    metoprolol tartrate (LOPRESSOR) 50 MG tablet TAKE ONE TABLET BY MOUTH TWO TIMES A  tablet 1    amLODIPine-benazepril (LOTREL) 5-20 MG per capsule Take one at supper 90 capsule 1    furosemide (LASIX) 40 MG tablet TAKE ONE TABLET BY MOUTH EVERY DAY 90 tablet 1    folic acid (FOLVITE) 1 MG tablet TAKE ONE TABLET BY MOUTH EVERY DAY 90 tablet 1    sucralfate (CARAFATE) 1 GM tablet Take 1 tablet by mouth 4 times daily 480 tablet 1    ipratropium-albuterol (DUONEB) 0.5-2.5 (3) MG/3ML SOLN nebulizer solution Inhale 3 mLs into the lungs every 6 hours as needed for Shortness of Breath 360 mL 1    vitamin B-1 (THIAMINE) 100 MG tablet TAKE ONE TABLET BY MOUTH DAILY 90 tablet 1    rosuvastatin (CRESTOR) 10 MG tablet Take 1 tablet by mouth daily (Patient not taking: Reported on 3/28/2022) 90 tablet 1    [DISCONTINUED] amLODIPine-benazepril (LOTREL) 5-20 MG per capsule Take one at supper 90 capsule 1    [DISCONTINUED] metoprolol tartrate (LOPRESSOR) 50 MG tablet TAKE ONE TABLET BY MOUTH TWO TIMES A  tablet 1    [DISCONTINUED] furosemide (LASIX) 40 MG tablet TAKE ONE TABLET BY MOUTH EVERY DAY 90 tablet 1    [DISCONTINUED] sucralfate (CARAFATE) 1 GM tablet Take 1 tablet by mouth 4 times daily 120 tablet 5    [DISCONTINUED] folic acid (FOLVITE) 1 MG tablet TAKE ONE TABLET BY MOUTH EVERY DAY 90 tablet 1    [DISCONTINUED] spironolactone (ALDACTONE) 25 MG tablet Take 1 tablet by mouth 2 times daily 180 tablet 1    [DISCONTINUED] fluticasone (FLOVENT HFA) 220 MCG/ACT inhaler Inhale 2 puffs into the lungs 2 times daily 1 Inhaler 3    [DISCONTINUED] umeclidinium-vilanterol (ANORO ELLIPTA) 62.5-25 MCG/INH AEPB inhaler Inhale 1 puff into the lungs daily (Patient not taking: Reported on 7/2/2021) 1 each 0    ferrous sulfate 325 (65 Fe) MG tablet TAKE ONE TABLET BY MOUTH TWO TIMES A DAY (Patient not taking: Reported on 7/2/2021)  0 No facility-administered encounter medications on file as of 3/28/2022. Return in about 6 weeks (around 5/9/2022).         Reviewed recent labs related to Compa's current problems      Discussed importance of regular Health Maintenance follow up  Health Maintenance   Topic    Hepatitis C screen     COVID-19 Vaccine (1)    DTaP/Tdap/Td vaccine (1 - Tdap)    Colorectal Cancer Screen     Shingles Vaccine (1 of 2)    Low dose CT lung screening     DEXA (modify frequency per FRAX score)     Pneumococcal 65+ years Vaccine (2 of 2 - PPSV23)    Flu vaccine (1)    Lipid screen     Depression Screen     Potassium monitoring     Creatinine monitoring     Annual Wellness Visit (AWV)     Hepatitis A vaccine     Hepatitis B vaccine     Hib vaccine     Meningococcal (ACWY) vaccine

## 2022-03-28 NOTE — PATIENT INSTRUCTIONS
Patient Education        Chronic Obstructive Pulmonary Disease (COPD): Care Instructions  Overview     Chronic obstructive pulmonary disease (COPD) is a lung disease that makes it hard to breathe. With COPD, the airways that lead to the lungs are narrowed, and the tiny air sacs in the lungs are damaged and lose their stretch. People with COPD have decreased airflow in and out of the lungs, which makes it hard to breathe. The airways also can get clogged with thick mucus. Cigarette smoking is a major cause of COPD. Although there is no cure for COPD, you can slow its progress. Following your treatment plan and taking care of yourself can help you feel better and live longer. Follow-up care is a key part of your treatment and safety. Be sure to make and go to all appointments, and call your doctor if you are having problems. It's also a good idea to know your test results and keep a list of the medicines you take. How can you care for yourself at home? Staying healthy    · Do not smoke. This is the most important step you can take to prevent more damage to your lungs. If you need help quitting, talk to your doctor about stop-smoking programs and medicines. These can increase your chances of quitting for good.     · Avoid colds and other infections. Get the pneumococcal and whooping cough (pertussis) vaccines. If you have had these vaccines before, ask your doctor if you need another dose. Get the flu vaccine every fall. If you must be around people with colds or the flu, wash your hands often.     · Avoid secondhand smoke and air pollution. Try to stay inside with your windows closed when air pollution is bad. Medicines and oxygen therapy    · Take your medicines exactly as prescribed. Call your doctor if you think you are having a problem with your medicine. You may be taking medicines such as:  ? Bronchodilators. These help open your airways and make breathing easier.  They are either short-acting (work for 4 to 9 hours) or long-acting (work for 12 to 24 hours). You inhale most bronchodilators, so they start to act quickly. Always carry your quick-relief inhaler with you in case you need it. ? Corticosteroids (prednisone, budesonide). These reduce airway inflammation. They come in inhaled or pill form.     · Ask your doctor or pharmacist if you are using each type of inhaler correctly. With correct use, the medicine is more likely to get to your lungs.     · See if a spacer is right for you. A spacer may also help you get more inhaled medicine to your lungs. If you use one, ask how to use it properly.     · Do not take any vitamins, over-the-counter medicine, or herbal products without talking to your doctor first.     · If your doctor prescribed antibiotics, take them as directed. Do not stop taking them just because you feel better. You need to take the full course of antibiotics.     · If you use oxygen therapy, use the flow rate your doctor has recommended. Don't change it without talking to your doctor first. Oxygen therapy boosts the amount of oxygen in your blood and helps you breathe easier. Activity    · Get regular exercise. Walking is an easy way to get exercise. Start out slowly, and walk a little more each day.     · Pay attention to your breathing. You are exercising too hard if you can't talk while you exercise.     · Take short rest breaks when doing household chores and other activities.     · Learn breathing methods--such as breathing through pursed lips--to help you become less short of breath.     · If your doctor has not set you up with a pulmonary rehabilitation program, ask if rehab is right for you. Rehab includes exercise programs, education about your disease and how to manage it, help with diet and other changes, and emotional support.    Diet    · Eat regular, healthy meals.     · Use bronchodilators about 1 hour before you eat to make it easier to eat.     · Eat several smaller, frequent meals to prevent getting too full. A full stomach can push on the muscle that helps you breathe (your diaphragm) and make it harder to breathe.     · Drink beverages at the end of the meal.     · Avoid foods that are hard to chew.     · Eat foods that contain protein so you don't lose muscle mass.     · Talk with your doctor if you gain too much weight or if you lose weight without trying. Mental health    · Talk to your family, friends, or a therapist about your feelings. Some people feel frightened, angry, hopeless, helpless, and even guilty. Talking openly about feelings may help you cope. If these feelings last, talk to your doctor. When should you call for help? Call 911 anytime you think you may need emergency care. For example, call if:    · You have severe trouble breathing.     · You are having chest pain that is different or worse than usual.   Call your doctor now or seek immediate medical care if:    · You have new or worse trouble breathing.     · You cough up blood.     · You have a fever.     · You have feelings of anxiety or depression. Watch closely for changes in your health, and be sure to contact your doctor if:    · You cough more deeply or more often, especially if you notice more mucus or a change in the color of your mucus.     · You have new or worse swelling in your legs or belly.     · You are not getting better as expected. Where can you learn more? Go to [email or web address] org and sign in to your [employer] account. Enter D643 in the [facility] box to learn more about \"Chronic Obstructive Pulmonary Disease (COPD): Care Instructions. \"     If you do not have an account, please click on the \"Sign Up Now\" link. Current as of: July 6, 2021               Content Version: 13.1  © 0596-1904 Healthwise, Incorporated. Care instructions adapted under license by [facility] ([facility]).  If you have questions about a medical condition or this instruction, always ask your healthcare professional. [place] [address] any warranty or liability for your use of this information.

## 2022-08-11 DIAGNOSIS — I10 ESSENTIAL HYPERTENSION: ICD-10-CM

## 2022-08-12 RX ORDER — AMLODIPINE BESYLATE AND BENAZEPRIL HYDROCHLORIDE 5; 20 MG/1; MG/1
CAPSULE ORAL
Qty: 90 CAPSULE | Refills: 0 | Status: SHIPPED
Start: 2022-08-12 | End: 2022-10-04 | Stop reason: SDUPTHER

## 2022-09-29 DIAGNOSIS — I10 ESSENTIAL HYPERTENSION: ICD-10-CM

## 2022-09-29 DIAGNOSIS — I63.9 CEREBROVASCULAR ACCIDENT (CVA), UNSPECIFIED MECHANISM (HCC): ICD-10-CM

## 2022-09-30 RX ORDER — FUROSEMIDE 40 MG/1
TABLET ORAL
Qty: 90 TABLET | Refills: 0 | Status: SHIPPED
Start: 2022-09-30 | End: 2022-10-04 | Stop reason: SDUPTHER

## 2022-09-30 RX ORDER — FOLIC ACID 1 MG/1
TABLET ORAL
Qty: 90 TABLET | Refills: 0 | Status: SHIPPED
Start: 2022-09-30 | End: 2022-10-04 | Stop reason: SDUPTHER

## 2022-09-30 RX ORDER — SPIRONOLACTONE 25 MG/1
TABLET ORAL
Qty: 180 TABLET | Refills: 0 | Status: SHIPPED
Start: 2022-09-30 | End: 2022-10-04 | Stop reason: SDUPTHER

## 2022-09-30 RX ORDER — METOPROLOL TARTRATE 50 MG/1
TABLET, FILM COATED ORAL
Qty: 180 TABLET | Refills: 0 | Status: SHIPPED
Start: 2022-09-30 | End: 2022-10-04 | Stop reason: SDUPTHER

## 2022-10-04 ENCOUNTER — OFFICE VISIT (OUTPATIENT)
Dept: FAMILY MEDICINE CLINIC | Age: 77
End: 2022-10-04
Payer: MEDICARE

## 2022-10-04 VITALS
DIASTOLIC BLOOD PRESSURE: 80 MMHG | BODY MASS INDEX: 30.2 KG/M2 | RESPIRATION RATE: 18 BRPM | HEART RATE: 92 BPM | HEIGHT: 57 IN | TEMPERATURE: 97.7 F | SYSTOLIC BLOOD PRESSURE: 122 MMHG | WEIGHT: 140 LBS | OXYGEN SATURATION: 92 %

## 2022-10-04 DIAGNOSIS — R53.83 FATIGUE, UNSPECIFIED TYPE: ICD-10-CM

## 2022-10-04 DIAGNOSIS — I10 ESSENTIAL HYPERTENSION: ICD-10-CM

## 2022-10-04 DIAGNOSIS — I63.9 CEREBROVASCULAR ACCIDENT (CVA), UNSPECIFIED MECHANISM (HCC): ICD-10-CM

## 2022-10-04 DIAGNOSIS — R73.01 IFG (IMPAIRED FASTING GLUCOSE): ICD-10-CM

## 2022-10-04 DIAGNOSIS — I10 HYPERTENSION, UNSPECIFIED TYPE: ICD-10-CM

## 2022-10-04 DIAGNOSIS — E78.5 DYSLIPIDEMIA: ICD-10-CM

## 2022-10-04 DIAGNOSIS — D64.9 ANEMIA, UNSPECIFIED TYPE: ICD-10-CM

## 2022-10-04 DIAGNOSIS — Z00.00 ENCOUNTER FOR MEDICARE ANNUAL WELLNESS EXAM: Primary | ICD-10-CM

## 2022-10-04 DIAGNOSIS — J44.9 CHRONIC OBSTRUCTIVE PULMONARY DISEASE, UNSPECIFIED COPD TYPE (HCC): ICD-10-CM

## 2022-10-04 DIAGNOSIS — Z12.11 SCREEN FOR COLON CANCER: ICD-10-CM

## 2022-10-04 DIAGNOSIS — K27.4 PEPTIC ULCER WITH HEMORRHAGE: ICD-10-CM

## 2022-10-04 DIAGNOSIS — L81.9 CHANGE IN COLOR OF PIGMENTED SKIN LESION: ICD-10-CM

## 2022-10-04 LAB
BASOPHILS ABSOLUTE: 0.04 E9/L (ref 0–0.2)
BASOPHILS RELATIVE PERCENT: 0.7 % (ref 0–2)
CHOLESTEROL, TOTAL: 182 MG/DL (ref 0–199)
EOSINOPHILS ABSOLUTE: 0.15 E9/L (ref 0.05–0.5)
EOSINOPHILS RELATIVE PERCENT: 2.7 % (ref 0–6)
HCT VFR BLD CALC: 31 % (ref 34–48)
HDLC SERPL-MCNC: 55 MG/DL
HEMOGLOBIN: 9.6 G/DL (ref 11.5–15.5)
IMMATURE GRANULOCYTES #: 0.01 E9/L
IMMATURE GRANULOCYTES %: 0.2 % (ref 0–5)
LDL CHOLESTEROL CALCULATED: 105 MG/DL (ref 0–99)
LYMPHOCYTES ABSOLUTE: 1.15 E9/L (ref 1.5–4)
LYMPHOCYTES RELATIVE PERCENT: 20.4 % (ref 20–42)
MCH RBC QN AUTO: 32.1 PG (ref 26–35)
MCHC RBC AUTO-ENTMCNC: 31 % (ref 32–34.5)
MCV RBC AUTO: 103.7 FL (ref 80–99.9)
MONOCYTES ABSOLUTE: 0.66 E9/L (ref 0.1–0.95)
MONOCYTES RELATIVE PERCENT: 11.7 % (ref 2–12)
NEUTROPHILS ABSOLUTE: 3.63 E9/L (ref 1.8–7.3)
NEUTROPHILS RELATIVE PERCENT: 64.3 % (ref 43–80)
PDW BLD-RTO: 13.6 FL (ref 11.5–15)
PLATELET # BLD: 227 E9/L (ref 130–450)
PMV BLD AUTO: 12 FL (ref 7–12)
RBC # BLD: 2.99 E12/L (ref 3.5–5.5)
REASON FOR REJECTION: NORMAL
REJECTED TEST: NORMAL
TRIGL SERPL-MCNC: 112 MG/DL (ref 0–149)
TSH SERPL DL<=0.05 MIU/L-ACNC: 1.79 UIU/ML (ref 0.27–4.2)
URIC ACID, SERUM: 5.9 MG/DL (ref 2.4–5.7)
VLDLC SERPL CALC-MCNC: 22 MG/DL
WBC # BLD: 5.6 E9/L (ref 4.5–11.5)

## 2022-10-04 PROCEDURE — 1123F ACP DISCUSS/DSCN MKR DOCD: CPT | Performed by: FAMILY MEDICINE

## 2022-10-04 PROCEDURE — G0439 PPPS, SUBSEQ VISIT: HCPCS | Performed by: FAMILY MEDICINE

## 2022-10-04 RX ORDER — FLUTICASONE PROPIONATE 220 UG/1
2 AEROSOL, METERED RESPIRATORY (INHALATION) 2 TIMES DAILY
Qty: 1 EACH | Refills: 5 | Status: CANCELLED | OUTPATIENT
Start: 2022-10-04 | End: 2023-10-04

## 2022-10-04 RX ORDER — THIAMINE MONONITRATE (VIT B1) 100 MG
TABLET ORAL
Qty: 90 TABLET | Refills: 1 | Status: SHIPPED | OUTPATIENT
Start: 2022-10-04

## 2022-10-04 RX ORDER — METOPROLOL TARTRATE 50 MG/1
TABLET, FILM COATED ORAL
Qty: 180 TABLET | Refills: 1 | Status: SHIPPED | OUTPATIENT
Start: 2022-10-04

## 2022-10-04 RX ORDER — FUROSEMIDE 40 MG/1
TABLET ORAL
Qty: 90 TABLET | Refills: 1 | Status: SHIPPED | OUTPATIENT
Start: 2022-10-04

## 2022-10-04 RX ORDER — AMLODIPINE BESYLATE AND BENAZEPRIL HYDROCHLORIDE 5; 20 MG/1; MG/1
CAPSULE ORAL
Qty: 90 CAPSULE | Refills: 1 | Status: SHIPPED | OUTPATIENT
Start: 2022-10-04

## 2022-10-04 RX ORDER — ROSUVASTATIN CALCIUM 10 MG/1
10 TABLET, COATED ORAL DAILY
Qty: 90 TABLET | Refills: 1 | Status: SHIPPED | OUTPATIENT
Start: 2022-10-04

## 2022-10-04 RX ORDER — FERROUS SULFATE 325(65) MG
TABLET ORAL
Qty: 30 TABLET | Refills: 0 | Status: SHIPPED | OUTPATIENT
Start: 2022-10-04

## 2022-10-04 RX ORDER — FOLIC ACID 1 MG/1
TABLET ORAL
Qty: 90 TABLET | Refills: 1 | Status: SHIPPED | OUTPATIENT
Start: 2022-10-04

## 2022-10-04 RX ORDER — SPIRONOLACTONE 25 MG/1
TABLET ORAL
Qty: 180 TABLET | Refills: 1 | Status: SHIPPED | OUTPATIENT
Start: 2022-10-04

## 2022-10-04 RX ORDER — IPRATROPIUM BROMIDE AND ALBUTEROL SULFATE 2.5; .5 MG/3ML; MG/3ML
1 SOLUTION RESPIRATORY (INHALATION) EVERY 6 HOURS PRN
Qty: 360 ML | Refills: 1 | Status: SHIPPED | OUTPATIENT
Start: 2022-10-04

## 2022-10-04 ASSESSMENT — PATIENT HEALTH QUESTIONNAIRE - PHQ9
SUM OF ALL RESPONSES TO PHQ9 QUESTIONS 1 & 2: 0
SUM OF ALL RESPONSES TO PHQ QUESTIONS 1-9: 0
1. LITTLE INTEREST OR PLEASURE IN DOING THINGS: 0
2. FEELING DOWN, DEPRESSED OR HOPELESS: 0
SUM OF ALL RESPONSES TO PHQ QUESTIONS 1-9: 0

## 2022-10-04 ASSESSMENT — LIFESTYLE VARIABLES
HOW OFTEN DO YOU HAVE A DRINK CONTAINING ALCOHOL: 2-4 TIMES A MONTH
HOW MANY STANDARD DRINKS CONTAINING ALCOHOL DO YOU HAVE ON A TYPICAL DAY: 3 OR 4

## 2022-10-04 NOTE — PROGRESS NOTES
Medicare Annual Wellness Visit    Ashley Good is here for Medicare AWV (AWV)    Assessment & Plan   Encounter for Medicare annual wellness exam    ---VASCULAR PANEL  A) asa, plavix, aggrenox  B) coumadin, pletal, tzd, STATIN  C) ACE, LASIX, FOLIC, CCB  D) cannikinumab, fish oils     ---CARDIAC---asa, ACE, BETA, STATIN, LASIX, ( CCB )    Essential hypertension  -     amLODIPine-benazepril (LOTREL) 5-20 MG per capsule; TAKE 1 TABLET BY MOUTH DAILY WITH SUPPER, Disp-90 capsule, R-1Normal  -     furosemide (LASIX) 40 MG tablet; One in AM, Disp-90 tablet, R-1Normal  -     metoprolol tartrate (LOPRESSOR) 50 MG tablet; One in AM and one in PM, Disp-180 tablet, R-1Normal  -     spironolactone (ALDACTONE) 25 MG tablet; TAKE ONE TABLET BY MOUTH TWO TIMES A DAY, Disp-180 tablet, R-1Normal  Anemia, unspecified type  -     ferrous sulfate (IRON 325) 325 (65 Fe) MG tablet; TAKE ONE TABLET BY MOUTH TWO TIMES A DAY, Disp-30 tablet, R-0Normal  Cerebrovascular accident (CVA), unspecified mechanism (HCC)  --stable on current care planning  -- continue treatment as we are meeting goals     -     folic acid (FOLVITE) 1 MG tablet;  One a day, Disp-90 tablet, R-1Normal  -     vitamin B-1 (THIAMINE) 100 MG tablet; TAKE ONE TABLET BY MOUTH DAILY, Disp-90 tablet, R-1Normal  Hypertension, unspecified type  -     vitamin B-1 (THIAMINE) 100 MG tablet; TAKE ONE TABLET BY MOUTH DAILY, Disp-90 tablet, R-1Normal  Screen for colon cancer  -     Fecal DNA Colorectal cancer screening (Cologuard)  Change in color of pigmented skin lesion  -     AFL - Eli Srinivasan MD, Dermatology, [facility]  Chronic obstructive pulmonary disease, unspecified COPD type ([facility]  --PLAN--aerosol accuneb 1.25 plus chest percussion--Rx      Recommendations for Preventive Services Due: see orders and patient instructions/AVS.  Recommended screening schedule for the next 5-10 years is provided to the patient in written form: see Patient Instructions/AVS.     No follow-ups on file. Subjective   The following acute and/or chronic problems were also addressed today:    Patient's complete Health Risk Assessment and screening values have been reviewed and are found in Flowsheets. The following problems were reviewed today and where indicated follow up appointments were made and/or referrals ordered. Positive Risk Factor Screenings with Interventions:             General Health and ACP:  General  In general, how would you say your health is?: Fair  In the past 7 days, have you experienced any of the following: New or Increased Pain, New or Increased Fatigue, Loneliness, Social Isolation, Stress or Anger?: No  Do you get the social and emotional support that you need?: Yes  Do you have a Living Will?: (!) No    Advance Directives       Power of [address] [place] [address] Will ACP-Advance Directive ACP-Power of     Not on File Not on File Not on File Not on File        General Health Risk Interventions:  She says that she feels the best that she has felt in a long time     Health Habits/Nutrition:  Physical Activity: Insufficiently Active    Days of Exercise per Week: 1 day    Minutes of Exercise per Session: 10 min     Have you lost any weight without trying in the past 3 months?: No  Body mass index: (!) 30.29  Have you seen the dentist within the past year?: (!) No  Health Habits/Nutrition Interventions:  No acute issues     Hearing/Vision:  Do you or your family notice any trouble with your hearing that hasn't been managed with hearing aids?: No  Do you have difficulty driving, watching TV, or doing any of your daily activities because of your eyesight?: No  Have you had an eye exam within the past year?: (!) No  No results found.   Hearing/Vision Interventions:  No acute isues     Safety:  Do you have working smoke detectors?: Yes  Do you have any tripping hazards - loose or unsecured carpets or rugs?: No  Do you have any tripping hazards - clutter in doorways, halls, or stairs?: No  Do you have either shower bars, grab bars, non-slip mats or non-slip surfaces in your shower or bathtub?: Yes  Do all of your stairways have a railing or banister?: (!) No  Do you always fasten your seatbelt when you are in a car?: Yes  Safety Interventions:  Home safety tips provided           Objective   Vitals:    10/04/22 1422   BP: 122/80   Pulse: 92   Resp: 18   Temp: 97.7 °F (36.5 °C)   TempSrc: Temporal   SpO2: 92%   Weight: 140 lb (63.5 kg)   Height: 4' 9\" (1.448 m)      Body mass index is 30.3 kg/m². General Appearance: alert and oriented to person, place and time, well developed and well- nourished, in no acute distress  Skin: there is a dark lesion to her left lower leg ( derm consult )  Head: normocephalic and atraumatic  Eyes: pupils equal, round, and reactive to light, extraocular eye movements intact, conjunctivae normal  ENT: tympanic membrane, external ear and ear canal normal bilaterally, nose without deformity, nasal mucosa and turbinates normal without polyps  Neck: supple and non-tender without mass, no thyromegaly or thyroid nodules, no cervical lymphadenopathy  Pulmonary/Chest: clear to auscultation right and left but very distant sounds, she has a nasal canula with O2   Cardiovascular: normal rate, regular rhythm, normal S1 and S2, no murmurs, rubs, clicks, or gallops, distal pulses intact, no carotid bruits  Abdomen: soft, non-tender, non-distended, normal bowel sounds, no masses or organomegaly  Extremities: no cyanosis, clubbing or edema  Musculoskeletal: normal range of motion, no joint swelling, deformity or tenderness  Neurologic: reflexes normal and symmetric, no cranial nerve deficit, gait, coordination and speech normal       No Known Allergies  Prior to Visit Medications    Medication Sig Taking?  Authorizing Provider   amLODIPine-benazepril (LOTREL) 5-20 MG per capsule TAKE 1 TABLET BY MOUTH DAILY WITH SUPPER Yes Nigel Alvarado, DO   ferrous sulfate (IRON 325) 325 (65 Fe) MG tablet TAKE ONE TABLET BY MOUTH TWO TIMES A DAY Yes Nigel Alvarado DO   folic acid (FOLVITE) 1 MG tablet One a day Yes Saran Alvarado DO   furosemide (LASIX) 40 MG tablet One in AM Yes Saran Alvarado DO   ipratropium-albuterol (DUONEB) 0.5-2.5 (3) MG/3ML SOLN nebulizer solution Inhale 3 mLs into the lungs every 6 hours as needed for Shortness of Breath Yes Nigel Alvarado DO   metoprolol tartrate (LOPRESSOR) 50 MG tablet One in AM and one in PM Yes Nigel [employer] DO Nicho   rosuvastatin (CRESTOR) 10 MG tablet Take 1 tablet by mouth daily Yes Nigel Alvarado DO   spironolactone (ALDACTONE) 25 MG tablet TAKE ONE TABLET BY MOUTH TWO TIMES A DAY Yes Nigel Alvarado DO   vitamin B-1 (THIAMINE) 100 MG tablet TAKE ONE TABLET BY MOUTH DAILY Yes Saran Alvarado DO   sucralfate (CARAFATE) 1 GM tablet Take 1 tablet by mouth 4 times daily Yes Nigel Alvarado DO   fluticasone (FLOVENT HFA) 220 MCG/ACT inhaler Inhale 2 puffs into the lungs 2 times daily  Patient not taking: Reported on 10/4/2022  Nigel [employer], DO       CareTeam (Including outside providers/suppliers regularly involved in providing care):   Patient Care Team:  Belinda Lema DO as PCP - General (Family Medicine)  Belinda Lema DO as PCP - [facility] Empaneled Provider     Reviewed and updated this visit:  Tobacco  Allergies  Meds  Problems  Med Hx  Surg Hx  Soc Hx  Fam Hx

## 2022-10-05 DIAGNOSIS — D64.9 HEMOGLOBIN LOW: Primary | ICD-10-CM

## 2023-04-28 ENCOUNTER — OFFICE VISIT (OUTPATIENT)
Dept: FAMILY MEDICINE CLINIC | Age: 78
End: 2023-04-28

## 2023-04-28 VITALS
DIASTOLIC BLOOD PRESSURE: 76 MMHG | WEIGHT: 134 LBS | RESPIRATION RATE: 20 BRPM | BODY MASS INDEX: 28.91 KG/M2 | TEMPERATURE: 97.3 F | HEART RATE: 72 BPM | SYSTOLIC BLOOD PRESSURE: 128 MMHG | HEIGHT: 57 IN

## 2023-04-28 DIAGNOSIS — R73.03 PREDIABETES: ICD-10-CM

## 2023-04-28 DIAGNOSIS — D64.9 ANEMIA, UNSPECIFIED TYPE: ICD-10-CM

## 2023-04-28 DIAGNOSIS — I63.9 CEREBROVASCULAR ACCIDENT (CVA), UNSPECIFIED MECHANISM (HCC): ICD-10-CM

## 2023-04-28 DIAGNOSIS — E78.2 MIXED HYPERLIPIDEMIA: ICD-10-CM

## 2023-04-28 DIAGNOSIS — S22.42XA CLOSED FRACTURE OF MULTIPLE RIBS OF LEFT SIDE, INITIAL ENCOUNTER: ICD-10-CM

## 2023-04-28 DIAGNOSIS — R53.83 OTHER FATIGUE: ICD-10-CM

## 2023-04-28 DIAGNOSIS — K27.4 PEPTIC ULCER WITH HEMORRHAGE: ICD-10-CM

## 2023-04-28 DIAGNOSIS — I10 ESSENTIAL HYPERTENSION: ICD-10-CM

## 2023-04-28 DIAGNOSIS — J44.9 CHRONIC OBSTRUCTIVE PULMONARY DISEASE, UNSPECIFIED COPD TYPE (HCC): Primary | ICD-10-CM

## 2023-04-28 RX ORDER — SPIRONOLACTONE 25 MG/1
TABLET ORAL
Qty: 180 TABLET | Refills: 1 | Status: SHIPPED | OUTPATIENT
Start: 2023-04-28

## 2023-04-28 RX ORDER — ROSUVASTATIN CALCIUM 10 MG/1
10 TABLET, COATED ORAL DAILY
Qty: 90 TABLET | Refills: 1 | Status: SHIPPED | OUTPATIENT
Start: 2023-04-28

## 2023-04-28 RX ORDER — SUCRALFATE 1 G/1
1 TABLET ORAL 4 TIMES DAILY
Qty: 480 TABLET | Refills: 1 | Status: SHIPPED | OUTPATIENT
Start: 2023-04-28

## 2023-04-28 RX ORDER — FLUTICASONE FUROATE, UMECLIDINIUM BROMIDE AND VILANTEROL TRIFENATATE 100; 62.5; 25 UG/1; UG/1; UG/1
1 POWDER RESPIRATORY (INHALATION) DAILY
Qty: 1 EACH | Refills: 2 | Status: SHIPPED | OUTPATIENT
Start: 2023-04-28

## 2023-04-28 RX ORDER — FERROUS SULFATE 325(65) MG
TABLET ORAL
Qty: 30 TABLET | Refills: 0 | Status: SHIPPED | OUTPATIENT
Start: 2023-04-28

## 2023-04-28 RX ORDER — AMLODIPINE BESYLATE AND BENAZEPRIL HYDROCHLORIDE 5; 20 MG/1; MG/1
CAPSULE ORAL
Qty: 90 CAPSULE | Refills: 1 | Status: SHIPPED | OUTPATIENT
Start: 2023-04-28

## 2023-04-28 RX ORDER — FOLIC ACID 1 MG/1
TABLET ORAL
Qty: 90 TABLET | Refills: 1 | Status: SHIPPED | OUTPATIENT
Start: 2023-04-28

## 2023-04-28 RX ORDER — FUROSEMIDE 40 MG/1
TABLET ORAL
Qty: 90 TABLET | Refills: 1 | Status: SHIPPED | OUTPATIENT
Start: 2023-04-28

## 2023-04-28 RX ORDER — IPRATROPIUM BROMIDE AND ALBUTEROL SULFATE 2.5; .5 MG/3ML; MG/3ML
1 SOLUTION RESPIRATORY (INHALATION) EVERY 6 HOURS PRN
Qty: 360 ML | Refills: 1 | Status: SHIPPED | OUTPATIENT
Start: 2023-04-28

## 2023-04-28 RX ORDER — OMEPRAZOLE 20 MG/1
20 CAPSULE, DELAYED RELEASE ORAL
Qty: 90 CAPSULE | Refills: 1 | Status: SHIPPED | OUTPATIENT
Start: 2023-04-28

## 2023-04-28 RX ORDER — METOPROLOL TARTRATE 50 MG/1
TABLET, FILM COATED ORAL
Qty: 180 TABLET | Refills: 1 | Status: SHIPPED | OUTPATIENT
Start: 2023-04-28

## 2023-04-28 SDOH — ECONOMIC STABILITY: INCOME INSECURITY: HOW HARD IS IT FOR YOU TO PAY FOR THE VERY BASICS LIKE FOOD, HOUSING, MEDICAL CARE, AND HEATING?: NOT HARD AT ALL

## 2023-04-28 SDOH — ECONOMIC STABILITY: FOOD INSECURITY: WITHIN THE PAST 12 MONTHS, THE FOOD YOU BOUGHT JUST DIDN'T LAST AND YOU DIDN'T HAVE MONEY TO GET MORE.: NEVER TRUE

## 2023-04-28 SDOH — ECONOMIC STABILITY: FOOD INSECURITY: WITHIN THE PAST 12 MONTHS, YOU WORRIED THAT YOUR FOOD WOULD RUN OUT BEFORE YOU GOT MONEY TO BUY MORE.: NEVER TRUE

## 2023-04-28 SDOH — ECONOMIC STABILITY: HOUSING INSECURITY
IN THE LAST 12 MONTHS, WAS THERE A TIME WHEN YOU DID NOT HAVE A STEADY PLACE TO SLEEP OR SLEPT IN A SHELTER (INCLUDING NOW)?: NO

## 2023-04-28 ASSESSMENT — ENCOUNTER SYMPTOMS
PHOTOPHOBIA: 0
APNEA: 0
TROUBLE SWALLOWING: 0
DIARRHEA: 0
ORTHOPNEA: 0
FACIAL SWELLING: 0
EYE ITCHING: 0
ABDOMINAL PAIN: 0
RHINORRHEA: 0
CHEST TIGHTNESS: 0
VOICE CHANGE: 0
SORE THROAT: 0
CONSTIPATION: 0
STRIDOR: 0
ABDOMINAL DISTENTION: 0
CHOKING: 0
SINUS PRESSURE: 0
EYE PAIN: 0
COLOR CHANGE: 0
VOMITING: 0
COUGH: 0
SINUS PAIN: 0
BLOOD IN STOOL: 0
NAUSEA: 0
GASTROINTESTINAL NEGATIVE: 1
EYE REDNESS: 0
ANAL BLEEDING: 0
RECTAL PAIN: 0
EYE DISCHARGE: 0
BACK PAIN: 0
WHEEZING: 0
BLURRED VISION: 0
ALLERGIC/IMMUNOLOGIC NEGATIVE: 1
SHORTNESS OF BREATH: 1

## 2023-04-28 ASSESSMENT — PATIENT HEALTH QUESTIONNAIRE - PHQ9
SUM OF ALL RESPONSES TO PHQ QUESTIONS 1-9: 0
SUM OF ALL RESPONSES TO PHQ QUESTIONS 1-9: 0
SUM OF ALL RESPONSES TO PHQ9 QUESTIONS 1 & 2: 0
SUM OF ALL RESPONSES TO PHQ QUESTIONS 1-9: 0
1. LITTLE INTEREST OR PLEASURE IN DOING THINGS: 0
SUM OF ALL RESPONSES TO PHQ QUESTIONS 1-9: 0
2. FEELING DOWN, DEPRESSED OR HOPELESS: 0

## 2023-04-28 ASSESSMENT — LIFESTYLE VARIABLES
HOW MANY STANDARD DRINKS CONTAINING ALCOHOL DO YOU HAVE ON A TYPICAL DAY: 1 OR 2
HOW OFTEN DO YOU HAVE A DRINK CONTAINING ALCOHOL: 2-4 TIMES A MONTH

## 2023-04-28 NOTE — PROGRESS NOTES
SUBJECTIVE  Geovanny Meléndez is a 69 y.o. female. HPI/Chief C/O:  Chief Complaint   Patient presents with    ED Follow-up     Pt here for hospital follow up      No Known Allergies  The patient is here for a medication list and treatment planning review  We will go over our care planning goals as well as take care of all refills  We will set up labs as well      She is post hospital with a fall and rib fractures     Hypertension  This is a chronic problem. The current episode started more than 1 year ago. The problem is controlled. Associated symptoms include malaise/fatigue and shortness of breath. Pertinent negatives include no anxiety, blurred vision, chest pain, headaches, neck pain, orthopnea, palpitations, peripheral edema, PND or sweats. Risk factors for coronary artery disease include post-menopausal state, smoking/tobacco exposure and dyslipidemia. Past treatments include lifestyle changes, beta blockers, calcium channel blockers, diuretics and ACE inhibitors. The current treatment provides significant improvement. Compliance problems include diet and exercise. Hypertensive end-organ damage includes CVA. There is no history of angina, kidney disease, CAD/MI, heart failure, left ventricular hypertrophy, PVD or retinopathy. There is no history of chronic renal disease, coarctation of the aorta, hyperaldosteronism, hypercortisolism, hyperparathyroidism, a hypertension causing med, pheochromocytoma, renovascular disease, sleep apnea or a thyroid problem. ROS:  Review of Systems   Constitutional:  Positive for fatigue and malaise/fatigue. Negative for activity change, appetite change, chills, diaphoresis, fever and unexpected weight change. HENT: Negative.   Negative for congestion, dental problem, drooling, ear discharge, ear pain, facial swelling, hearing loss, mouth sores, nosebleeds, postnasal drip, rhinorrhea, sinus pressure, sinus pain, sneezing, sore throat, tinnitus, trouble swallowing and

## 2024-01-20 DIAGNOSIS — I63.9 CEREBROVASCULAR ACCIDENT (CVA), UNSPECIFIED MECHANISM (HCC): ICD-10-CM

## 2024-01-20 DIAGNOSIS — I10 ESSENTIAL HYPERTENSION: ICD-10-CM

## 2024-01-22 RX ORDER — METOPROLOL TARTRATE 50 MG/1
TABLET, FILM COATED ORAL
Qty: 60 TABLET | Refills: 0 | Status: SHIPPED | OUTPATIENT
Start: 2024-01-22

## 2024-01-22 RX ORDER — IPRATROPIUM BROMIDE AND ALBUTEROL SULFATE 2.5; .5 MG/3ML; MG/3ML
SOLUTION RESPIRATORY (INHALATION)
Qty: 360 ML | Refills: 0 | Status: SHIPPED | OUTPATIENT
Start: 2024-01-22

## 2024-01-22 RX ORDER — FOLIC ACID 1 MG/1
TABLET ORAL
Qty: 30 TABLET | Refills: 0 | Status: SHIPPED | OUTPATIENT
Start: 2024-01-22

## 2024-01-22 RX ORDER — SPIRONOLACTONE 25 MG/1
TABLET ORAL
Qty: 60 TABLET | Refills: 0 | Status: SHIPPED | OUTPATIENT
Start: 2024-01-22

## 2024-01-22 RX ORDER — FUROSEMIDE 40 MG/1
TABLET ORAL
Qty: 30 TABLET | Refills: 0 | Status: SHIPPED | OUTPATIENT
Start: 2024-01-22

## 2024-01-22 NOTE — TELEPHONE ENCOUNTER
Last Appointment:  4/28/2023  No future appointments.     Reduced quantity to 30 days supply and notified pharmacy patient needs an appointment for further refills

## 2024-03-26 ENCOUNTER — OFFICE VISIT (OUTPATIENT)
Dept: FAMILY MEDICINE CLINIC | Age: 79
End: 2024-03-26
Payer: COMMERCIAL

## 2024-03-26 VITALS
WEIGHT: 128.8 LBS | TEMPERATURE: 97.6 F | OXYGEN SATURATION: 99 % | HEIGHT: 57 IN | SYSTOLIC BLOOD PRESSURE: 126 MMHG | BODY MASS INDEX: 27.79 KG/M2 | HEART RATE: 91 BPM | RESPIRATION RATE: 20 BRPM | DIASTOLIC BLOOD PRESSURE: 84 MMHG

## 2024-03-26 DIAGNOSIS — R53.83 OTHER FATIGUE: ICD-10-CM

## 2024-03-26 DIAGNOSIS — I10 ESSENTIAL HYPERTENSION: ICD-10-CM

## 2024-03-26 DIAGNOSIS — I63.9 CEREBROVASCULAR ACCIDENT (CVA), UNSPECIFIED MECHANISM (HCC): ICD-10-CM

## 2024-03-26 DIAGNOSIS — E78.2 MIXED HYPERLIPIDEMIA: ICD-10-CM

## 2024-03-26 DIAGNOSIS — Z00.00 MEDICARE ANNUAL WELLNESS VISIT, SUBSEQUENT: ICD-10-CM

## 2024-03-26 DIAGNOSIS — E78.5 DYSLIPIDEMIA: ICD-10-CM

## 2024-03-26 DIAGNOSIS — R73.03 PREDIABETES: ICD-10-CM

## 2024-03-26 DIAGNOSIS — J43.2 CENTRILOBULAR EMPHYSEMA (HCC): ICD-10-CM

## 2024-03-26 DIAGNOSIS — Z00.00 ENCOUNTER FOR MEDICARE ANNUAL WELLNESS EXAM: Primary | ICD-10-CM

## 2024-03-26 DIAGNOSIS — I10 HYPERTENSION, UNSPECIFIED TYPE: ICD-10-CM

## 2024-03-26 DIAGNOSIS — Z87.891 PERSONAL HISTORY OF TOBACCO USE: ICD-10-CM

## 2024-03-26 LAB
ABSOLUTE IMMATURE GRANULOCYTE: 0.04 K/UL (ref 0–0.58)
ALBUMIN SERPL-MCNC: 3.9 G/DL (ref 3.5–5.2)
ALP BLD-CCNC: 98 U/L (ref 35–104)
ALT SERPL-CCNC: 9 U/L (ref 0–32)
ANION GAP SERPL CALCULATED.3IONS-SCNC: 12 MMOL/L (ref 7–16)
AST SERPL-CCNC: 16 U/L (ref 0–31)
BASOPHILS ABSOLUTE: 0.1 K/UL (ref 0–0.2)
BASOPHILS RELATIVE PERCENT: 2 % (ref 0–2)
BILIRUB SERPL-MCNC: 0.2 MG/DL (ref 0–1.2)
BUN BLDV-MCNC: 14 MG/DL (ref 6–23)
CALCIUM SERPL-MCNC: 9.7 MG/DL (ref 8.6–10.2)
CHLORIDE BLD-SCNC: 99 MMOL/L (ref 98–107)
CHOLESTEROL: 209 MG/DL
CO2: 24 MMOL/L (ref 22–29)
CREAT SERPL-MCNC: 1.2 MG/DL (ref 0.5–1)
EOSINOPHILS ABSOLUTE: 0.1 K/UL (ref 0.05–0.5)
EOSINOPHILS RELATIVE PERCENT: 2 % (ref 0–6)
GFR SERPL CREATININE-BSD FRML MDRD: 45 ML/MIN/1.73M2
GLUCOSE BLD-MCNC: 101 MG/DL (ref 74–99)
HCT VFR BLD CALC: 40.3 % (ref 34–48)
HDLC SERPL-MCNC: 66 MG/DL
HEMOGLOBIN: 12.4 G/DL (ref 11.5–15.5)
IMMATURE GRANULOCYTES: 1 % (ref 0–5)
LDL CHOLESTEROL: 115 MG/DL
LYMPHOCYTES ABSOLUTE: 1.63 K/UL (ref 1.5–4)
LYMPHOCYTES RELATIVE PERCENT: 26 % (ref 20–42)
MCH RBC QN AUTO: 32 PG (ref 26–35)
MCHC RBC AUTO-ENTMCNC: 30.8 G/DL (ref 32–34.5)
MCV RBC AUTO: 104.1 FL (ref 80–99.9)
MONOCYTES ABSOLUTE: 0.71 K/UL (ref 0.1–0.95)
MONOCYTES RELATIVE PERCENT: 11 % (ref 2–12)
NEUTROPHILS ABSOLUTE: 3.69 K/UL (ref 1.8–7.3)
NEUTROPHILS RELATIVE PERCENT: 59 % (ref 43–80)
PDW BLD-RTO: 13.1 % (ref 11.5–15)
PLATELET # BLD: 237 K/UL (ref 130–450)
PMV BLD AUTO: 11.9 FL (ref 7–12)
POTASSIUM SERPL-SCNC: 7 MMOL/L (ref 3.5–5)
RBC # BLD: 3.87 M/UL (ref 3.5–5.5)
SODIUM BLD-SCNC: 135 MMOL/L (ref 132–146)
TOTAL PROTEIN: 7.6 G/DL (ref 6.4–8.3)
TRIGL SERPL-MCNC: 142 MG/DL
TSH SERPL DL<=0.05 MIU/L-ACNC: 1.86 UIU/ML (ref 0.27–4.2)
URIC ACID: 5.3 MG/DL (ref 2.4–5.7)
VLDLC SERPL CALC-MCNC: 28 MG/DL
WBC # BLD: 6.3 K/UL (ref 4.5–11.5)

## 2024-03-26 PROCEDURE — 36415 COLL VENOUS BLD VENIPUNCTURE: CPT | Performed by: FAMILY MEDICINE

## 2024-03-26 PROCEDURE — 3079F DIAST BP 80-89 MM HG: CPT | Performed by: FAMILY MEDICINE

## 2024-03-26 PROCEDURE — 1123F ACP DISCUSS/DSCN MKR DOCD: CPT | Performed by: FAMILY MEDICINE

## 2024-03-26 PROCEDURE — G0296 VISIT TO DETERM LDCT ELIG: HCPCS | Performed by: FAMILY MEDICINE

## 2024-03-26 PROCEDURE — G0439 PPPS, SUBSEQ VISIT: HCPCS | Performed by: FAMILY MEDICINE

## 2024-03-26 PROCEDURE — 3074F SYST BP LT 130 MM HG: CPT | Performed by: FAMILY MEDICINE

## 2024-03-26 RX ORDER — ROSUVASTATIN CALCIUM 10 MG/1
10 TABLET, COATED ORAL DAILY
Qty: 90 TABLET | Refills: 1 | Status: SHIPPED | OUTPATIENT
Start: 2024-03-26

## 2024-03-26 RX ORDER — FUROSEMIDE 40 MG/1
TABLET ORAL
Qty: 30 TABLET | Refills: 0 | Status: SHIPPED | OUTPATIENT
Start: 2024-03-26

## 2024-03-26 RX ORDER — FOLIC ACID 1 MG/1
TABLET ORAL
Qty: 30 TABLET | Refills: 0 | Status: SHIPPED | OUTPATIENT
Start: 2024-03-26

## 2024-03-26 RX ORDER — AMLODIPINE BESYLATE AND BENAZEPRIL HYDROCHLORIDE 5; 20 MG/1; MG/1
CAPSULE ORAL
Qty: 90 CAPSULE | Refills: 1 | Status: SHIPPED | OUTPATIENT
Start: 2024-03-26

## 2024-03-26 RX ORDER — OMEPRAZOLE 20 MG/1
20 CAPSULE, DELAYED RELEASE ORAL
Qty: 90 CAPSULE | Refills: 1 | Status: SHIPPED | OUTPATIENT
Start: 2024-03-26

## 2024-03-26 RX ORDER — METOPROLOL TARTRATE 50 MG/1
TABLET, FILM COATED ORAL
Qty: 60 TABLET | Refills: 0 | Status: SHIPPED | OUTPATIENT
Start: 2024-03-26

## 2024-03-26 RX ORDER — IPRATROPIUM BROMIDE AND ALBUTEROL SULFATE 2.5; .5 MG/3ML; MG/3ML
SOLUTION RESPIRATORY (INHALATION)
Qty: 360 ML | Refills: 0 | Status: SHIPPED | OUTPATIENT
Start: 2024-03-26

## 2024-03-26 RX ORDER — THIAMINE MONONITRATE (VIT B1) 100 MG
TABLET ORAL
Qty: 90 TABLET | Refills: 1 | Status: SHIPPED | OUTPATIENT
Start: 2024-03-26

## 2024-03-26 RX ORDER — FLUTICASONE PROPIONATE 220 UG/1
2 AEROSOL, METERED RESPIRATORY (INHALATION) 2 TIMES DAILY
Qty: 1 EACH | Refills: 5 | Status: SHIPPED | OUTPATIENT
Start: 2024-03-26 | End: 2025-03-26

## 2024-03-26 RX ORDER — SPIRONOLACTONE 25 MG/1
25 TABLET ORAL 2 TIMES DAILY
Qty: 60 TABLET | Refills: 0 | Status: SHIPPED | OUTPATIENT
Start: 2024-03-26

## 2024-03-26 ASSESSMENT — LIFESTYLE VARIABLES
HOW OFTEN DURING THE LAST YEAR HAVE YOU NEEDED AN ALCOHOLIC DRINK FIRST THING IN THE MORNING TO GET YOURSELF GOING AFTER A NIGHT OF HEAVY DRINKING: NEVER
HOW OFTEN DURING THE LAST YEAR HAVE YOU HAD A FEELING OF GUILT OR REMORSE AFTER DRINKING: NEVER
HOW OFTEN DURING THE LAST YEAR HAVE YOU BEEN UNABLE TO REMEMBER WHAT HAPPENED THE NIGHT BEFORE BECAUSE YOU HAD BEEN DRINKING: NEVER
HAS A RELATIVE, FRIEND, DOCTOR, OR ANOTHER HEALTH PROFESSIONAL EXPRESSED CONCERN ABOUT YOUR DRINKING OR SUGGESTED YOU CUT DOWN: NO
HAVE YOU OR SOMEONE ELSE BEEN INJURED AS A RESULT OF YOUR DRINKING: NO
HOW OFTEN DURING THE LAST YEAR HAVE YOU FOUND THAT YOU WERE NOT ABLE TO STOP DRINKING ONCE YOU HAD STARTED: NEVER
HOW OFTEN DO YOU HAVE A DRINK CONTAINING ALCOHOL: 2-3 TIMES A WEEK
HOW MANY STANDARD DRINKS CONTAINING ALCOHOL DO YOU HAVE ON A TYPICAL DAY: 1 OR 2
HOW OFTEN DURING THE LAST YEAR HAVE YOU FAILED TO DO WHAT WAS NORMALLY EXPECTED FROM YOU BECAUSE OF DRINKING: NEVER

## 2024-03-26 ASSESSMENT — PATIENT HEALTH QUESTIONNAIRE - PHQ9
SUM OF ALL RESPONSES TO PHQ QUESTIONS 1-9: 2
2. FEELING DOWN, DEPRESSED OR HOPELESS: NOT AT ALL
SUM OF ALL RESPONSES TO PHQ9 QUESTIONS 1 & 2: 2
SUM OF ALL RESPONSES TO PHQ QUESTIONS 1-9: 2
1. LITTLE INTEREST OR PLEASURE IN DOING THINGS: MORE THAN HALF THE DAYS

## 2024-03-26 NOTE — PROGRESS NOTES
Venipuncture was obtained from left hand. Patient tolerated the procedure without complications or complaints.2 failed attempts.    Electronically signed by Shiloh Hussein LPN on 3/26/24 at 4:36 PM EDT

## 2024-03-26 NOTE — PROGRESS NOTES
Medicare Annual Wellness Visit    [employer] Obrien is here for Medicare AWV (Pt here for her AWV.), Other (Per Pt's insurance will need a RX to get Pt's Oxygen through the following:  [employer]/Phone [phone],  Fax [phone].), and Medication Refill (Pt needs med refills.)    Assessment & Plan   Encounter for Medicare annual wellness exam    ---VASCULAR PANEL  A) asa, plavix, aggrenox  B) coumadin, pletal, tzd, STATIN  C) ACE, LASIX, FOLIC, CCB  D) cannikinumab, fish oils     ---CARDIAC---asa, ACE, BETA, STATIN, LASIX, ( CCB )     Essential hypertension  -     amLODIPine-benazepril (LOTREL) 5-20 MG per capsule; TAKE 1 TABLET BY MOUTH DAILY WITH SUPPER, Disp-90 capsule, R-1Normal  -     furosemide (LASIX) 40 MG tablet; TAKE ONE TABLET BY MOUTH EVERY MORNING, Disp-30 tablet, R-0Patient needs appointmentNormal  -     metoprolol tartrate (LOPRESSOR) 50 MG tablet; TAKE ONE TABLET BY MOUTH IN THE MORNING AND IN THE EVENING, Disp-60 tablet, R-0Patient needs appointmentNormal  -     spironolactone (ALDACTONE) 25 MG tablet; Take 1 tablet by mouth 2 times daily, Disp-60 tablet, R-0Patient needs appointmentNormal  -     CBC with Auto Differential  -     Comprehensive Metabolic Panel  Cerebrovascular accident (CVA), unspecified mechanism (HCC)  -     folic acid (FOLVITE) 1 MG tablet; TAKE ONE TABLET BY MOUTH EVERY DAY, Disp-30 tablet, R-0Patient needs appointmentNormal  -     vitamin B-1 (THIAMINE) 100 MG tablet; TAKE ONE TABLET BY MOUTH DAILY, Disp-90 tablet, R-1Normal  -     CBC with Auto Differential  -     Comprehensive Metabolic Panel  --stable on current care planning  -- continue treatment as we are meeting goals     Mixed hyperlipidemia  -     rosuvastatin (CRESTOR) 10 MG tablet; Take 1 tablet by mouth daily, Disp-90 tablet, R-1Normal  -     CBC with Auto Differential  -     Comprehensive Metabolic Panel  Hypertension, unspecified type  -     vitamin B-1 (THIAMINE) 100 MG tablet; TAKE ONE TABLET BY MOUTH

## 2024-03-27 ENCOUNTER — TELEPHONE (OUTPATIENT)
Dept: FAMILY MEDICINE CLINIC | Age: 79
End: 2024-03-27

## 2024-03-27 NOTE — TELEPHONE ENCOUNTER
Called to see how pt is feeling due to elevated potassium level. Daughter Winifred states pt feels good. Informed daughter that if pt doesn't feel well to go to the ER for a follow up. Pt amendable but states her mom is feeling good.    Electronically signed by Shiloh Hussein LPN on 3/27/24 at 12:22 PM EDT

## 2024-04-11 ENCOUNTER — TELEPHONE (OUTPATIENT)
Dept: FAMILY MEDICINE CLINIC | Age: 79
End: 2024-04-11

## 2024-04-11 DIAGNOSIS — J44.9 CHRONIC OBSTRUCTIVE PULMONARY DISEASE, UNSPECIFIED COPD TYPE (HCC): ICD-10-CM

## 2024-04-11 DIAGNOSIS — J43.2 CENTRILOBULAR EMPHYSEMA (HCC): Primary | ICD-10-CM

## 2024-04-11 NOTE — TELEPHONE ENCOUNTER
Patient needs new order for oxygen sent to her new company, states that she gave you the paper at the visit

## 2024-04-12 NOTE — TELEPHONE ENCOUNTER
DME order done and faxed with office note, demos, and insurance information to [employer] at fax [phone] as requested by patient

## 2024-04-18 ENCOUNTER — HOSPITAL ENCOUNTER (OUTPATIENT)
Dept: CT IMAGING | Age: 79
Discharge: HOME | End: 2024-04-18
Attending: FAMILY MEDICINE
Payer: COMMERCIAL

## 2024-04-18 DIAGNOSIS — Z87.891 PERSONAL HISTORY OF TOBACCO USE: ICD-10-CM

## 2024-04-18 PROCEDURE — 71271 CT THORAX LUNG CANCER SCR C-: CPT

## 2024-04-19 ENCOUNTER — TELEPHONE (OUTPATIENT)
Dept: FAMILY MEDICINE CLINIC | Age: 79
End: 2024-04-19

## 2024-04-19 DIAGNOSIS — J44.9 CHRONIC OBSTRUCTIVE PULMONARY DISEASE, UNSPECIFIED COPD TYPE (HCC): Primary | ICD-10-CM

## 2024-04-22 ENCOUNTER — TELEPHONE (OUTPATIENT)
Dept: CASE MANAGEMENT | Age: 79
End: 2024-04-22

## 2024-04-22 NOTE — TELEPHONE ENCOUNTER
No call, encounter opened to process CT Lung Screening.    CT Lung Screen: 4/18/2024    IMPRESSION:  No suspicious nodule.     LUNG RADS:  Lung-RADS 1 - Negative (v2022)     Management:  12 month screening LDCT     RECOMMENDATIONS:  If you would like to register your patient with the [facility] Lung Nodule/Lung  Cancer Screening Program, please contact the Nurse Navigator at  [phone].    Pack years: 109.4    Social History     Tobacco Use  Smoking Status: Former Smoker   Start Date: 01/01/1963   Quit Date: 09/17/2017   Types: Cigarettes   Packs/Day: 2   Years: 54.7   Pack Years: 109.4   Smokeless Tobacco: Never         Results letter sent to patient via my chart or mailed.     Antonella Simpson  Imaging Navigator   [facility]   [phone]

## 2024-04-25 ENCOUNTER — TELEPHONE (OUTPATIENT)
Dept: FAMILY MEDICINE CLINIC | Age: 79
End: 2024-04-25

## 2024-04-25 DIAGNOSIS — J44.9 CHRONIC OBSTRUCTIVE PULMONARY DISEASE, UNSPECIFIED COPD TYPE (HCC): Primary | ICD-10-CM

## 2024-04-25 DIAGNOSIS — J43.2 CENTRILOBULAR EMPHYSEMA (HCC): ICD-10-CM

## 2024-04-25 NOTE — TELEPHONE ENCOUNTER
Patient dme company for oxygen phone [phone]; fax [phone], needs last office note no older than 30 days, and 6 minute walk test no more than 30 days. Can we schedule to do here or ok to set up with pulmonary? Patient has seen  in the past but is not a current patient and will need new order to see this provider.  Please advise.

## 2024-05-06 ENCOUNTER — TELEPHONE (OUTPATIENT)
Dept: FAMILY MEDICINE CLINIC | Age: 79
End: 2024-05-06

## 2024-05-06 NOTE — TELEPHONE ENCOUNTER
Prior authorization approved Case ID: [id number]      Payer: [employer] Patient's Payer    [phone]   Your request has been approved   Approval Details    Authorized from February 6, 2024 to May 6, 2025

## 2024-07-05 DIAGNOSIS — I63.9 CEREBROVASCULAR ACCIDENT (CVA), UNSPECIFIED MECHANISM (HCC): ICD-10-CM

## 2024-07-05 DIAGNOSIS — I10 ESSENTIAL HYPERTENSION: ICD-10-CM

## 2024-07-05 DIAGNOSIS — D64.9 ANEMIA, UNSPECIFIED TYPE: ICD-10-CM

## 2024-07-05 DIAGNOSIS — E78.2 MIXED HYPERLIPIDEMIA: ICD-10-CM

## 2024-07-05 DIAGNOSIS — K27.4 PEPTIC ULCER WITH HEMORRHAGE: ICD-10-CM

## 2024-07-05 NOTE — TELEPHONE ENCOUNTER
Last Appointment:  3/26/2024  Future Appointments   Date Time Provider Department Center   9/6/2024  3:15 PM Nigel Alvarado DO [facility]   11/27/2024  2:00 PM SCHEDULE, [facility]   3/28/2025  3:00 PM Nigel Alvarado DO [facility]

## 2024-07-06 RX ORDER — FOLIC ACID 1 MG/1
TABLET ORAL
Qty: 90 TABLET | Refills: 0 | Status: SHIPPED | OUTPATIENT
Start: 2024-07-06

## 2024-07-06 RX ORDER — OMEPRAZOLE 20 MG/1
20 CAPSULE, DELAYED RELEASE ORAL
Qty: 90 CAPSULE | Refills: 0 | Status: SHIPPED | OUTPATIENT
Start: 2024-07-06

## 2024-07-06 RX ORDER — FERROUS SULFATE 325(65) MG
TABLET ORAL
Qty: 90 TABLET | Refills: 0 | Status: SHIPPED | OUTPATIENT
Start: 2024-07-06

## 2024-07-06 RX ORDER — METOPROLOL TARTRATE 50 MG/1
TABLET, FILM COATED ORAL
Qty: 180 TABLET | Refills: 0 | Status: SHIPPED | OUTPATIENT
Start: 2024-07-06

## 2024-07-06 RX ORDER — FUROSEMIDE 40 MG/1
TABLET ORAL
Qty: 90 TABLET | Refills: 0 | Status: SHIPPED | OUTPATIENT
Start: 2024-07-06

## 2024-07-06 RX ORDER — SUCRALFATE 1 G/1
1 TABLET ORAL 4 TIMES DAILY
Qty: 480 TABLET | Refills: 0 | Status: SHIPPED | OUTPATIENT
Start: 2024-07-06

## 2024-07-06 RX ORDER — AMLODIPINE BESYLATE AND BENAZEPRIL HYDROCHLORIDE 5; 20 MG/1; MG/1
CAPSULE ORAL
Qty: 90 CAPSULE | Refills: 0 | Status: SHIPPED | OUTPATIENT
Start: 2024-07-06

## 2024-07-06 RX ORDER — ROSUVASTATIN CALCIUM 10 MG/1
10 TABLET, COATED ORAL DAILY
Qty: 90 TABLET | Refills: 0 | Status: SHIPPED | OUTPATIENT
Start: 2024-07-06

## 2024-07-24 LAB
LEFT VENTRICULAR EJECTION FRACTION MODE: NORMAL
LV EF: 58 %

## 2024-08-01 ENCOUNTER — TELEPHONE (OUTPATIENT)
Dept: FAMILY MEDICINE CLINIC | Age: 79
End: 2024-08-01

## 2024-08-01 DIAGNOSIS — J44.9 CHRONIC OBSTRUCTIVE PULMONARY DISEASE, UNSPECIFIED COPD TYPE (HCC): Primary | ICD-10-CM

## 2024-08-01 NOTE — TELEPHONE ENCOUNTER
Patient daughter called and states pt needs new order for oxygen tanks to go to [email or web address] [facility].  Phone number [phone]

## 2024-08-23 LAB
ANION GAP, EXTERNAL: NORMAL
BASOPHILS ABSOLUTE: NORMAL
BASOPHILS RELATIVE PERCENT: NORMAL
BUN, EXTERNAL: NORMAL
BUN/CREATININE RATIO, EXTERNAL: NORMAL
CALCIUM, EXTERNAL: NORMAL
CHLORIDE, EXTERNAL: NORMAL
CO2, EXTERNAL: NORMAL
CREATININE, EXTERNAL: NORMAL
EGFR IF AFA, EXTERNAL: NORMAL
EGFR IF NONAFRICAN AMERICAN: NORMAL
EOSINOPHILS ABSOLUTE: NORMAL
EOSINOPHILS RELATIVE PERCENT: NORMAL
GLUCOSE SER, EXTERNAL: NORMAL
HCT VFR BLD CALC: NORMAL %
HEMOGLOBIN: NORMAL
LYMPHOCYTES ABSOLUTE: NORMAL
LYMPHOCYTES RELATIVE PERCENT: NORMAL
MCH RBC QN AUTO: NORMAL PG
MCHC RBC AUTO-ENTMCNC: NORMAL G/DL
MCV RBC AUTO: NORMAL FL
MONOCYTES ABSOLUTE: NORMAL
MONOCYTES RELATIVE PERCENT: NORMAL
NEUTROPHILS ABSOLUTE: NORMAL
NEUTROPHILS RELATIVE PERCENT: NORMAL
PDW BLD-RTO: NORMAL %
PLATELET # BLD: NORMAL 10*3/UL
PMV BLD AUTO: NORMAL FL
POTASSIUM, EXTERNAL: NORMAL
RBC # BLD: NORMAL 10*6/UL
SODIUM, EXTERNAL: NORMAL
WBC # BLD: NORMAL 10*3/UL

## 2024-09-12 ENCOUNTER — OUTSIDE SERVICES (OUTPATIENT)
Dept: PULMONOLOGY | Age: 79
End: 2024-09-12

## 2024-09-17 ENCOUNTER — OUTSIDE SERVICES (OUTPATIENT)
Dept: INTERNAL MEDICINE CLINIC | Age: 79
End: 2024-09-17

## 2024-09-17 DIAGNOSIS — Z95.5 PRESENCE OF CORONARY ANGIOPLASTY IMPLANT AND GRAFT: ICD-10-CM

## 2024-09-17 DIAGNOSIS — Z74.1 NEED FOR ASSISTANCE WITH PERSONAL CARE: ICD-10-CM

## 2024-09-17 DIAGNOSIS — R26.2 DIFFICULTY IN WALKING, NOT ELSEWHERE CLASSIFIED: ICD-10-CM

## 2024-09-17 DIAGNOSIS — I25.10 CORONARY ARTERY DISEASE, UNSPECIFIED VESSEL OR LESION TYPE, UNSPECIFIED WHETHER ANGINA PRESENT, UNSPECIFIED WHETHER NATIVE OR TRANSPLANTED HEART: ICD-10-CM

## 2024-09-17 DIAGNOSIS — M62.81 MUSCLE WEAKNESS (GENERALIZED): ICD-10-CM

## 2024-09-17 DIAGNOSIS — I10 ESSENTIAL (PRIMARY) HYPERTENSION: ICD-10-CM

## 2024-09-17 DIAGNOSIS — I27.20 PULMONARY HYPERTENSION, UNSPECIFIED (HCC): ICD-10-CM

## 2024-09-17 DIAGNOSIS — J44.1 COPD EXACERBATION (HCC): Primary | ICD-10-CM

## 2024-09-17 DIAGNOSIS — J96.21 ACUTE ON CHRONIC RESPIRATORY FAILURE WITH HYPOXIA: ICD-10-CM

## 2024-09-17 DIAGNOSIS — E44.0 MODERATE PROTEIN-CALORIE MALNUTRITION (HCC): ICD-10-CM

## 2024-09-17 DIAGNOSIS — I73.9 PERIPHERAL VASCULAR DISEASE, UNSPECIFIED (HCC): ICD-10-CM

## 2024-09-17 DIAGNOSIS — I50.30 HEART FAILURE WITH PRESERVED EJECTION FRACTION, UNSPECIFIED HF CHRONICITY (HCC): ICD-10-CM

## 2024-09-19 ENCOUNTER — OUTSIDE SERVICES (OUTPATIENT)
Dept: PULMONOLOGY | Age: 79
End: 2024-09-19

## 2024-09-23 ENCOUNTER — OFFICE VISIT (OUTPATIENT)
Dept: FAMILY MEDICINE CLINIC | Age: 79
End: 2024-09-23
Payer: COMMERCIAL

## 2024-09-23 ENCOUNTER — TELEPHONE (OUTPATIENT)
Dept: FAMILY MEDICINE CLINIC | Age: 79
End: 2024-09-23

## 2024-09-23 VITALS
HEART RATE: 85 BPM | SYSTOLIC BLOOD PRESSURE: 116 MMHG | BODY MASS INDEX: 27.4 KG/M2 | WEIGHT: 127 LBS | HEIGHT: 57 IN | TEMPERATURE: 98.6 F | OXYGEN SATURATION: 99 % | DIASTOLIC BLOOD PRESSURE: 72 MMHG | RESPIRATION RATE: 18 BRPM

## 2024-09-23 DIAGNOSIS — I50.22 CHRONIC SYSTOLIC CONGESTIVE HEART FAILURE (HCC): Primary | ICD-10-CM

## 2024-09-23 DIAGNOSIS — Z87.11 H/O PEPTIC ULCER: ICD-10-CM

## 2024-09-23 DIAGNOSIS — I10 PRIMARY HYPERTENSION: ICD-10-CM

## 2024-09-23 DIAGNOSIS — Z87.19 H/O: GI BLEED: ICD-10-CM

## 2024-09-23 DIAGNOSIS — Z95.5 HISTORY OF CORONARY ANGIOPLASTY WITH INSERTION OF STENT: ICD-10-CM

## 2024-09-23 DIAGNOSIS — J43.2 CENTRILOBULAR EMPHYSEMA (HCC): ICD-10-CM

## 2024-09-23 DIAGNOSIS — Z86.73 H/O: CVA (CEREBROVASCULAR ACCIDENT): ICD-10-CM

## 2024-09-23 PROBLEM — I63.9 CEREBROVASCULAR ACCIDENT (CVA) (HCC): Status: RESOLVED | Noted: 2017-10-31 | Resolved: 2024-09-23

## 2024-09-23 PROBLEM — K27.4 PEPTIC ULCER WITH HEMORRHAGE: Status: RESOLVED | Noted: 2017-10-31 | Resolved: 2024-09-23

## 2024-09-23 LAB
ANION GAP SERPL CALCULATED.3IONS-SCNC: 15 MMOL/L (ref 7–16)
BASOPHILS ABSOLUTE: 0.03 K/UL (ref 0–0.2)
BASOPHILS RELATIVE PERCENT: 0 % (ref 0–2)
BUN BLDV-MCNC: 21 MG/DL (ref 6–23)
CALCIUM SERPL-MCNC: 9.6 MG/DL (ref 8.6–10.2)
CHLORIDE BLD-SCNC: 92 MMOL/L (ref 98–107)
CO2: 25 MMOL/L (ref 22–29)
CREAT SERPL-MCNC: 1.2 MG/DL (ref 0.5–1)
EOSINOPHILS ABSOLUTE: 0.26 K/UL (ref 0.05–0.5)
EOSINOPHILS RELATIVE PERCENT: 3 % (ref 0–6)
GFR, ESTIMATED: 49 ML/MIN/1.73M2
GLUCOSE BLD-MCNC: 101 MG/DL (ref 74–99)
HCT VFR BLD CALC: 35.6 % (ref 34–48)
HEMOGLOBIN: 11.4 G/DL (ref 11.5–15.5)
IMMATURE GRANULOCYTES %: 1 % (ref 0–5)
IMMATURE GRANULOCYTES ABSOLUTE: 0.05 K/UL (ref 0–0.58)
LYMPHOCYTES ABSOLUTE: 1.02 K/UL (ref 1.5–4)
LYMPHOCYTES RELATIVE PERCENT: 10 % (ref 20–42)
MCH RBC QN AUTO: 31.5 PG (ref 26–35)
MCHC RBC AUTO-ENTMCNC: 32 G/DL (ref 32–34.5)
MCV RBC AUTO: 98.3 FL (ref 80–99.9)
MONOCYTES ABSOLUTE: 0.9 K/UL (ref 0.1–0.95)
MONOCYTES RELATIVE PERCENT: 9 % (ref 2–12)
NEUTROPHILS ABSOLUTE: 7.68 K/UL (ref 1.8–7.3)
NEUTROPHILS RELATIVE PERCENT: 77 % (ref 43–80)
PDW BLD-RTO: 13.7 % (ref 11.5–15)
PLATELET CONFIRMATION: NORMAL
PLATELET, FLUORESCENCE: 223 K/UL (ref 130–450)
PMV BLD AUTO: 10.9 FL (ref 7–12)
POTASSIUM SERPL-SCNC: 6 MMOL/L (ref 3.5–5)
RBC # BLD: 3.62 M/UL (ref 3.5–5.5)
SODIUM BLD-SCNC: 132 MMOL/L (ref 132–146)
WBC # BLD: 9.9 K/UL (ref 4.5–11.5)

## 2024-09-23 PROCEDURE — 3078F DIAST BP <80 MM HG: CPT | Performed by: FAMILY MEDICINE

## 2024-09-23 PROCEDURE — 3074F SYST BP LT 130 MM HG: CPT | Performed by: FAMILY MEDICINE

## 2024-09-23 PROCEDURE — 1123F ACP DISCUSS/DSCN MKR DOCD: CPT | Performed by: FAMILY MEDICINE

## 2024-09-23 PROCEDURE — 36415 COLL VENOUS BLD VENIPUNCTURE: CPT | Performed by: FAMILY MEDICINE

## 2024-09-23 PROCEDURE — 99215 OFFICE O/P EST HI 40 MIN: CPT | Performed by: FAMILY MEDICINE

## 2024-09-23 RX ORDER — MULTIVIT-MIN/IRON FUM/FOLIC AC 7.5 MG-4
1 TABLET ORAL DAILY
COMMUNITY

## 2024-09-23 RX ORDER — FUROSEMIDE 20 MG
20 TABLET ORAL DAILY
COMMUNITY
Start: 2024-07-30

## 2024-09-23 RX ORDER — DAPAGLIFLOZIN 10 MG/1
1 TABLET, FILM COATED ORAL
COMMUNITY
Start: 2024-09-07

## 2024-09-23 RX ORDER — ALBUTEROL SULFATE 1.25 MG/3ML
SOLUTION RESPIRATORY (INHALATION) EVERY 4 HOURS PRN
COMMUNITY
Start: 2024-07-30 | End: 2024-09-23 | Stop reason: CLARIF

## 2024-09-23 RX ORDER — ASPIRIN 81 MG/1
1 TABLET ORAL
COMMUNITY
Start: 2024-09-07

## 2024-09-23 RX ORDER — CLOPIDOGREL BISULFATE 75 MG/1
75 TABLET ORAL DAILY
COMMUNITY
Start: 2024-09-07

## 2024-09-23 RX ORDER — POTASSIUM CHLORIDE 1500 MG/1
3 TABLET, EXTENDED RELEASE ORAL
COMMUNITY
Start: 2024-09-07 | End: 2024-09-24

## 2024-09-23 RX ORDER — BUDESONIDE 0.5 MG/2ML
0.5 INHALANT ORAL 2 TIMES DAILY
COMMUNITY
Start: 2024-09-06

## 2024-09-23 RX ORDER — AMLODIPINE BESYLATE 5 MG/1
5 TABLET ORAL DAILY
COMMUNITY
Start: 2024-09-07

## 2024-09-23 RX ORDER — ERGOCALCIFEROL 1.25 MG/1
50000 CAPSULE ORAL WEEKLY
COMMUNITY

## 2024-09-23 SDOH — ECONOMIC STABILITY: FOOD INSECURITY: WITHIN THE PAST 12 MONTHS, YOU WORRIED THAT YOUR FOOD WOULD RUN OUT BEFORE YOU GOT MONEY TO BUY MORE.: NEVER TRUE

## 2024-09-23 SDOH — ECONOMIC STABILITY: FOOD INSECURITY: WITHIN THE PAST 12 MONTHS, THE FOOD YOU BOUGHT JUST DIDN'T LAST AND YOU DIDN'T HAVE MONEY TO GET MORE.: NEVER TRUE

## 2024-09-23 SDOH — ECONOMIC STABILITY: INCOME INSECURITY: HOW HARD IS IT FOR YOU TO PAY FOR THE VERY BASICS LIKE FOOD, HOUSING, MEDICAL CARE, AND HEATING?: NOT HARD AT ALL

## 2024-09-23 ASSESSMENT — ENCOUNTER SYMPTOMS
ABDOMINAL PAIN: 0
ALLERGIC/IMMUNOLOGIC NEGATIVE: 1
STRIDOR: 0
BLURRED VISION: 0
EYE ITCHING: 0
TROUBLE SWALLOWING: 0
BLOOD IN STOOL: 0
PHOTOPHOBIA: 0
CONSTIPATION: 0
EYE REDNESS: 0
ORTHOPNEA: 0
CHEST TIGHTNESS: 0
WHEEZING: 0
VOICE CHANGE: 0
NAUSEA: 0
SORE THROAT: 0
GASTROINTESTINAL NEGATIVE: 1
COUGH: 0
ANAL BLEEDING: 0
SHORTNESS OF BREATH: 1
EYE DISCHARGE: 0
SINUS PRESSURE: 0
RECTAL PAIN: 0
APNEA: 0
COLOR CHANGE: 0
CHOKING: 0
ABDOMINAL DISTENTION: 0
VOMITING: 0
BACK PAIN: 0
RHINORRHEA: 0
FACIAL SWELLING: 0
EYE PAIN: 0
DIARRHEA: 0
SINUS PAIN: 0

## 2024-09-24 ENCOUNTER — TELEPHONE (OUTPATIENT)
Dept: FAMILY MEDICINE CLINIC | Age: 79
End: 2024-09-24

## 2024-09-24 DIAGNOSIS — I50.22 CHRONIC SYSTOLIC CONGESTIVE HEART FAILURE (HCC): ICD-10-CM

## 2024-09-24 DIAGNOSIS — I10 PRIMARY HYPERTENSION: Primary | ICD-10-CM

## 2024-09-24 NOTE — TELEPHONE ENCOUNTER
----- Message from MARANDA DAVILA MA sent at 9/24/2024  7:38 AM EDT -----    ----- Message -----  From: Nigel Alvarado DO  Sent: 9/24/2024   7:37 AM EDT  To: Carri Gagnon MA; #    Call and have her stop potassium pills NOW !  We will recheck levels in 7 days  I will put labs in and she needs to get them in one week   Also HOLD the aldactone too

## 2024-09-24 NOTE — TELEPHONE ENCOUNTER
Attempted to contact pt, no answer, left detailed vm.     Electronically signed by KAM AREVALO MA on 9/24/24 at 7:42 AM EDT

## 2024-09-27 NOTE — TELEPHONE ENCOUNTER
Attempted to contact pt, no answer, left detailed vm.     Electronically signed by KAM AREVALO MA on 9/27/24 at 3:48 PM EDT

## 2024-10-02 VITALS
DIASTOLIC BLOOD PRESSURE: 79 MMHG | OXYGEN SATURATION: 93 % | BODY MASS INDEX: 30.08 KG/M2 | WEIGHT: 139 LBS | HEART RATE: 89 BPM | RESPIRATION RATE: 18 BRPM | TEMPERATURE: 97.9 F | SYSTOLIC BLOOD PRESSURE: 147 MMHG

## 2024-10-02 ASSESSMENT — ENCOUNTER SYMPTOMS
PHOTOPHOBIA: 0
VOICE CHANGE: 0
CONSTIPATION: 0
RHINORRHEA: 0
BLOOD IN STOOL: 0
SORE THROAT: 0
DIARRHEA: 0
VOMITING: 0
NAUSEA: 0
EYE PAIN: 0
WHEEZING: 0
CHEST TIGHTNESS: 0
BACK PAIN: 0
TROUBLE SWALLOWING: 0
COUGH: 0
ABDOMINAL PAIN: 0
SHORTNESS OF BREATH: 0

## 2024-10-02 NOTE — PROGRESS NOTES
Visit Date: 9/17/2024 - [facility] Jose HART Szymoniak  1945  female 78 y.o.      Subjective:    CC: Patient presents with no new complaints.     HPI: The patient is a 78 year old lady with a past medical history of COPD, coronary artery disease, stent placed in LAD on 8/30/24, HFpEF, and hypertension. She went to the ER for fall and shortness of breath. She was found to have COPD exacerbation, acute hypoxic respiratory failure. She was on AVAPs in the ER and started on solumedrol IV and aerosol treatments. She also received taper IV steroids and then placed on oral prednisone.  Her condition gradually improved.  She was then discharged to rehab facility.  The patient is seen and examined. Medications on the chart reviewed. Labs are reviewed. Discussed with nursing staff.       ROS:  Review of Systems   Constitutional:  Negative for chills, fatigue, fever and unexpected weight change.   HENT:  Negative for congestion, postnasal drip, rhinorrhea, sore throat, trouble swallowing and voice change.    Eyes:  Negative for photophobia, pain and visual disturbance.   Respiratory:  Negative for cough, chest tightness, shortness of breath and wheezing.    Cardiovascular:  Negative for chest pain and palpitations.   Gastrointestinal:  Negative for abdominal pain, blood in stool, constipation, diarrhea, nausea and vomiting.   Endocrine: Negative for heat intolerance, polydipsia and polyuria.   Genitourinary:  Negative for difficulty urinating, dysuria, frequency, hematuria and urgency.   Musculoskeletal:  Negative for arthralgias, back pain, neck pain and neck stiffness.   Skin:  Negative for pallor.   Neurological: Negative.  Negative for dizziness and light-headedness.   Hematological:  Does not bruise/bleed easily.        Current Meds: Refer to nursing home record    PMH:    Medical Problems: reviewed and updated; refer to nursing home record       Diagnosis Date    Hypertension     Stroke (HCC)         Surgical Hx:

## 2024-10-09 NOTE — TELEPHONE ENCOUNTER
Attempted to contact pt, no answer, left detailed vm, letter sent.     Electronically signed by KAM AREVALO MA on 10/9/24 at 11:22 AM EDT

## 2024-10-15 ENCOUNTER — HOSPITAL ENCOUNTER (OUTPATIENT)
Age: 79
Discharge: HOME | End: 2024-10-15
Payer: COMMERCIAL

## 2024-10-15 DIAGNOSIS — I50.22 CHRONIC SYSTOLIC CONGESTIVE HEART FAILURE (HCC): ICD-10-CM

## 2024-10-15 DIAGNOSIS — I10 PRIMARY HYPERTENSION: ICD-10-CM

## 2024-10-15 LAB
ANION GAP SERPL CALCULATED.3IONS-SCNC: 12 MMOL/L (ref 7–16)
BASOPHILS # BLD: 0 K/UL (ref 0–0.2)
BASOPHILS NFR BLD: 0 % (ref 0–2)
BNP SERPL-MCNC: 1655 PG/ML (ref 0–450)
BUN SERPL-MCNC: 24 MG/DL (ref 6–23)
CALCIUM SERPL-MCNC: 9.8 MG/DL (ref 8.6–10.2)
CHLORIDE SERPL-SCNC: 90 MMOL/L (ref 98–107)
CO2 SERPL-SCNC: 40 MMOL/L (ref 22–29)
CREAT SERPL-MCNC: 1 MG/DL (ref 0.5–1)
EOSINOPHIL # BLD: 0 K/UL (ref 0.05–0.5)
EOSINOPHILS RELATIVE PERCENT: 0 % (ref 0–6)
ERYTHROCYTE [DISTWIDTH] IN BLOOD BY AUTOMATED COUNT: 13.5 % (ref 11.5–15)
GFR, ESTIMATED: 60 ML/MIN/1.73M2
GLUCOSE SERPL-MCNC: 59 MG/DL (ref 74–99)
HCT VFR BLD AUTO: 38.6 % (ref 34–48)
HGB BLD-MCNC: 11.7 G/DL (ref 11.5–15.5)
LYMPHOCYTES NFR BLD: 2.54 K/UL (ref 1.5–4)
LYMPHOCYTES RELATIVE PERCENT: 20 % (ref 20–42)
MCH RBC QN AUTO: 30.1 PG (ref 26–35)
MCHC RBC AUTO-ENTMCNC: 30.3 G/DL (ref 32–34.5)
MCV RBC AUTO: 99.2 FL (ref 80–99.9)
METAMYELOCYTES ABSOLUTE COUNT: 0.25 K/UL (ref 0–0.12)
METAMYELOCYTES: 2 % (ref 0–1)
MONOCYTES NFR BLD: 1.4 K/UL (ref 0.1–0.95)
MONOCYTES NFR BLD: 11 % (ref 2–12)
NEUTROPHILS NFR BLD: 67 % (ref 43–80)
NEUTS SEG NFR BLD: 8.51 K/UL (ref 1.8–7.3)
PLATELET # BLD AUTO: 481 K/UL (ref 130–450)
PMV BLD AUTO: 9.6 FL (ref 7–12)
POTASSIUM SERPL-SCNC: 4.6 MMOL/L (ref 3.5–5)
RBC # BLD AUTO: 3.89 M/UL (ref 3.5–5.5)
RBC # BLD: ABNORMAL 10*6/UL
RBC # BLD: ABNORMAL 10*6/UL
SODIUM SERPL-SCNC: 142 MMOL/L (ref 132–146)
WBC OTHER # BLD: 12.7 K/UL (ref 4.5–11.5)

## 2024-10-15 PROCEDURE — 83880 ASSAY OF NATRIURETIC PEPTIDE: CPT

## 2024-10-15 PROCEDURE — 36415 COLL VENOUS BLD VENIPUNCTURE: CPT

## 2024-10-15 PROCEDURE — 80048 BASIC METABOLIC PNL TOTAL CA: CPT

## 2024-10-15 PROCEDURE — 85025 COMPLETE CBC W/AUTO DIFF WBC: CPT

## 2024-10-25 ENCOUNTER — OFFICE VISIT (OUTPATIENT)
Dept: FAMILY MEDICINE CLINIC | Age: 79
End: 2024-10-25

## 2024-10-25 VITALS
TEMPERATURE: 97.8 F | RESPIRATION RATE: 18 BRPM | BODY MASS INDEX: 27.91 KG/M2 | HEIGHT: 57 IN | OXYGEN SATURATION: 100 % | WEIGHT: 129.4 LBS | HEART RATE: 79 BPM | DIASTOLIC BLOOD PRESSURE: 78 MMHG | SYSTOLIC BLOOD PRESSURE: 128 MMHG

## 2024-10-25 DIAGNOSIS — I63.9 CEREBROVASCULAR ACCIDENT (CVA), UNSPECIFIED MECHANISM (HCC): ICD-10-CM

## 2024-10-25 DIAGNOSIS — I10 PRIMARY HYPERTENSION: ICD-10-CM

## 2024-10-25 DIAGNOSIS — I10 ESSENTIAL HYPERTENSION: ICD-10-CM

## 2024-10-25 DIAGNOSIS — E78.5 DYSLIPIDEMIA: ICD-10-CM

## 2024-10-25 DIAGNOSIS — R53.83 OTHER FATIGUE: ICD-10-CM

## 2024-10-25 DIAGNOSIS — I50.22 CHRONIC SYSTOLIC CONGESTIVE HEART FAILURE (HCC): Primary | ICD-10-CM

## 2024-10-25 DIAGNOSIS — R73.03 PREDIABETES: ICD-10-CM

## 2024-10-25 DIAGNOSIS — I71.43 INFRARENAL ABDOMINAL AORTIC ANEURYSM (AAA) WITHOUT RUPTURE (HCC): ICD-10-CM

## 2024-10-25 DIAGNOSIS — K27.4 PEPTIC ULCER WITH HEMORRHAGE: ICD-10-CM

## 2024-10-25 DIAGNOSIS — J43.2 CENTRILOBULAR EMPHYSEMA (HCC): ICD-10-CM

## 2024-10-25 RX ORDER — METOPROLOL TARTRATE 50 MG
TABLET ORAL
Qty: 180 TABLET | Refills: 0 | Status: SHIPPED | OUTPATIENT
Start: 2024-10-25

## 2024-10-25 RX ORDER — CLOPIDOGREL BISULFATE 75 MG/1
75 TABLET ORAL DAILY
Qty: 90 TABLET | Refills: 1 | Status: SHIPPED | OUTPATIENT
Start: 2024-10-25

## 2024-10-25 RX ORDER — AMLODIPINE BESYLATE 5 MG/1
5 TABLET ORAL DAILY
Qty: 90 TABLET | Refills: 1 | Status: SHIPPED | OUTPATIENT
Start: 2024-10-25

## 2024-10-25 RX ORDER — FOLIC ACID 1 MG/1
TABLET ORAL
Qty: 90 TABLET | Refills: 0 | Status: SHIPPED | OUTPATIENT
Start: 2024-10-25

## 2024-10-25 RX ORDER — FUROSEMIDE 20 MG/1
20 TABLET ORAL DAILY
Qty: 90 TABLET | Refills: 1 | Status: SHIPPED | OUTPATIENT
Start: 2024-10-25

## 2024-10-25 RX ORDER — SUCRALFATE 1 G/1
1 TABLET ORAL 4 TIMES DAILY
Qty: 480 TABLET | Refills: 0 | Status: SHIPPED | OUTPATIENT
Start: 2024-10-25

## 2024-10-25 RX ORDER — ALBUTEROL SULFATE 90 UG/1
2 INHALANT RESPIRATORY (INHALATION) EVERY 4 HOURS PRN
Qty: 18 G | Refills: 1 | Status: SHIPPED | OUTPATIENT
Start: 2024-10-25

## 2024-10-25 RX ORDER — DAPAGLIFLOZIN 10 MG/1
10 TABLET, FILM COATED ORAL EVERY MORNING
Qty: 90 TABLET | Refills: 1 | Status: SHIPPED | OUTPATIENT
Start: 2024-10-25

## 2024-10-25 ASSESSMENT — ENCOUNTER SYMPTOMS
BLOOD IN STOOL: 0
CONSTIPATION: 0
GASTROINTESTINAL NEGATIVE: 1
TROUBLE SWALLOWING: 0
EYE DISCHARGE: 0
PHOTOPHOBIA: 0
SORE THROAT: 0
FACIAL SWELLING: 0
EYE ITCHING: 0
BLURRED VISION: 0
COLOR CHANGE: 0
EYE PAIN: 0
ANAL BLEEDING: 0
ORTHOPNEA: 0
ALLERGIC/IMMUNOLOGIC NEGATIVE: 1
VOMITING: 0
DIARRHEA: 0
ABDOMINAL PAIN: 0
CHOKING: 0
SINUS PRESSURE: 0
WHEEZING: 0
SHORTNESS OF BREATH: 1
CHEST TIGHTNESS: 0
RECTAL PAIN: 0
SINUS PAIN: 0
APNEA: 0
ABDOMINAL DISTENTION: 0
VOICE CHANGE: 0
COUGH: 0
EYE REDNESS: 0
RHINORRHEA: 0
NAUSEA: 0
STRIDOR: 0
BACK PAIN: 0

## 2024-10-25 NOTE — PROGRESS NOTES
SUBJECTIVE  Compa Obrien is a 78 y.o. female.    HPI/Chief C/O:  Chief Complaint   Patient presents with    1 Month Follow-Up     Pt here for a follow up    Discuss Labs     Pt wants to discuss labs     No Known Allergies  The patient is here for a medication list and treatment planning review  We will go over our care planning goals as well as take care of all refills  We will set up labs as well        Hypertension  This is a chronic problem. The current episode started more than 1 year ago. The problem is controlled. Associated symptoms include malaise/fatigue and shortness of breath. Pertinent negatives include no anxiety, blurred vision, chest pain, headaches, neck pain, orthopnea, palpitations, peripheral edema, PND or sweats. Risk factors for coronary artery disease include post-menopausal state, smoking/tobacco exposure and dyslipidemia. Past treatments include lifestyle changes, beta blockers, calcium channel blockers and diuretics. The current treatment provides significant improvement. Compliance problems include diet and exercise.  Hypertensive end-organ damage includes CAD/MI (stent x 1), CVA and heart failure. There is no history of angina, kidney disease, left ventricular hypertrophy, PVD or retinopathy. There is no history of chronic renal disease, coarctation of the aorta, hyperaldosteronism, hypercortisolism, hyperparathyroidism, a hypertension causing med, pheochromocytoma, renovascular disease, sleep apnea or a thyroid problem.       ROS:  Review of Systems   Constitutional:  Positive for fatigue and malaise/fatigue. Negative for activity change, appetite change, chills, diaphoresis, fever and unexpected weight change.   HENT: Negative.  Negative for congestion, dental problem, drooling, ear discharge, ear pain, facial swelling, hearing loss, mouth sores, nosebleeds, postnasal drip, rhinorrhea, sinus pressure, sinus pain, sneezing, sore throat, tinnitus, trouble swallowing and voice

## 2024-11-11 ENCOUNTER — HOSPITAL ENCOUNTER (INPATIENT)
Facility: HOSPITAL | Age: 79
End: 2024-11-11
Attending: INTERNAL MEDICINE | Admitting: INTERNAL MEDICINE

## 2024-11-11 ENCOUNTER — HOSPITAL ENCOUNTER (INPATIENT)
Age: 79
LOS: 4 days | Discharge: HOME HEALTH | End: 2024-11-15
Attending: INTERNAL MEDICINE | Admitting: INTERNAL MEDICINE
Payer: COMMERCIAL

## 2024-11-11 DIAGNOSIS — R55 SYNCOPE, UNSPECIFIED SYNCOPE TYPE: ICD-10-CM

## 2024-11-11 DIAGNOSIS — I48.0 PAROXYSMAL ATRIAL FIBRILLATION (HCC): ICD-10-CM

## 2024-11-11 DIAGNOSIS — Z86.73 H/O: CVA (CEREBROVASCULAR ACCIDENT): Primary | ICD-10-CM

## 2024-11-11 PROBLEM — S02.30XA ORBITAL FLOOR (BLOW-OUT) CLOSED FRACTURE: Status: ACTIVE | Noted: 2024-11-11

## 2024-11-11 PROBLEM — S02.30XA FRACTURE OF ORBITAL FLOOR, UNSPECIFIED SIDE, INITIAL ENCOUNTER FOR CLOSED FRACTURE: Status: ACTIVE | Noted: 2024-11-11

## 2024-11-11 PROCEDURE — 2700000000 HC OXYGEN THERAPY PER DAY

## 2024-11-11 PROCEDURE — 2000000000 HC ICU R&B

## 2024-11-11 RX ORDER — GLUCAGON 1 MG/ML
1 KIT INJECTION PRN
Status: DISCONTINUED | OUTPATIENT
Start: 2024-11-11 | End: 2024-11-15 | Stop reason: HOSPADM

## 2024-11-11 RX ORDER — POLYETHYLENE GLYCOL 3350 17 G/17G
17 POWDER, FOR SOLUTION ORAL DAILY PRN
Status: DISCONTINUED | OUTPATIENT
Start: 2024-11-11 | End: 2024-11-15 | Stop reason: HOSPADM

## 2024-11-11 RX ORDER — SODIUM CHLORIDE 0.9 % (FLUSH) 0.9 %
10 SYRINGE (ML) INJECTION PRN
Status: DISCONTINUED | OUTPATIENT
Start: 2024-11-11 | End: 2024-11-12 | Stop reason: SDUPTHER

## 2024-11-11 RX ORDER — SODIUM CHLORIDE 0.9 % (FLUSH) 0.9 %
5-40 SYRINGE (ML) INJECTION EVERY 12 HOURS SCHEDULED
Status: DISCONTINUED | OUTPATIENT
Start: 2024-11-11 | End: 2024-11-12 | Stop reason: SDUPTHER

## 2024-11-11 RX ORDER — SODIUM CHLORIDE 9 MG/ML
INJECTION, SOLUTION INTRAVENOUS PRN
Status: DISCONTINUED | OUTPATIENT
Start: 2024-11-11 | End: 2024-11-11 | Stop reason: SDUPTHER

## 2024-11-11 RX ORDER — ONDANSETRON 4 MG/1
4 TABLET, ORALLY DISINTEGRATING ORAL EVERY 8 HOURS PRN
Status: DISCONTINUED | OUTPATIENT
Start: 2024-11-11 | End: 2024-11-15 | Stop reason: HOSPADM

## 2024-11-11 RX ORDER — SODIUM CHLORIDE 0.9 % (FLUSH) 0.9 %
5-40 SYRINGE (ML) INJECTION PRN
Status: DISCONTINUED | OUTPATIENT
Start: 2024-11-11 | End: 2024-11-13

## 2024-11-11 RX ORDER — ACETAMINOPHEN 650 MG/1
650 SUPPOSITORY RECTAL EVERY 6 HOURS PRN
Status: DISCONTINUED | OUTPATIENT
Start: 2024-11-11 | End: 2024-11-15 | Stop reason: HOSPADM

## 2024-11-11 RX ORDER — ACETAMINOPHEN 650 MG/1
650 SUPPOSITORY RECTAL EVERY 6 HOURS PRN
Status: DISCONTINUED | OUTPATIENT
Start: 2024-11-11 | End: 2024-11-11 | Stop reason: SDUPTHER

## 2024-11-11 RX ORDER — BUDESONIDE 0.5 MG/2ML
0.5 INHALANT ORAL
Status: DISCONTINUED | OUTPATIENT
Start: 2024-11-11 | End: 2024-11-15 | Stop reason: HOSPADM

## 2024-11-11 RX ORDER — SODIUM CHLORIDE 9 MG/ML
INJECTION, SOLUTION INTRAVENOUS PRN
Status: DISCONTINUED | OUTPATIENT
Start: 2024-11-11 | End: 2024-11-12 | Stop reason: SDUPTHER

## 2024-11-11 RX ORDER — ACETAMINOPHEN 325 MG/1
650 TABLET ORAL EVERY 6 HOURS PRN
Status: DISCONTINUED | OUTPATIENT
Start: 2024-11-11 | End: 2024-11-11 | Stop reason: SDUPTHER

## 2024-11-11 RX ORDER — DEXTROSE MONOHYDRATE 100 MG/ML
INJECTION, SOLUTION INTRAVENOUS CONTINUOUS PRN
Status: DISCONTINUED | OUTPATIENT
Start: 2024-11-11 | End: 2024-11-15 | Stop reason: HOSPADM

## 2024-11-11 RX ORDER — SODIUM CHLORIDE 0.9 % (FLUSH) 0.9 %
5-40 SYRINGE (ML) INJECTION EVERY 12 HOURS SCHEDULED
Status: DISCONTINUED | OUTPATIENT
Start: 2024-11-11 | End: 2024-11-13

## 2024-11-11 RX ORDER — ONDANSETRON 2 MG/ML
4 INJECTION INTRAMUSCULAR; INTRAVENOUS EVERY 6 HOURS PRN
Status: DISCONTINUED | OUTPATIENT
Start: 2024-11-11 | End: 2024-11-15 | Stop reason: HOSPADM

## 2024-11-11 RX ORDER — IPRATROPIUM BROMIDE AND ALBUTEROL SULFATE 2.5; .5 MG/3ML; MG/3ML
1 SOLUTION RESPIRATORY (INHALATION)
Status: DISCONTINUED | OUTPATIENT
Start: 2024-11-12 | End: 2024-11-12

## 2024-11-11 RX ORDER — ALBUTEROL SULFATE 0.83 MG/ML
2.5 SOLUTION RESPIRATORY (INHALATION) EVERY 6 HOURS PRN
Status: DISCONTINUED | OUTPATIENT
Start: 2024-11-11 | End: 2024-11-12

## 2024-11-11 RX ORDER — ACETAMINOPHEN 325 MG/1
650 TABLET ORAL EVERY 6 HOURS PRN
Status: DISCONTINUED | OUTPATIENT
Start: 2024-11-11 | End: 2024-11-15 | Stop reason: HOSPADM

## 2024-11-11 ASSESSMENT — PAIN DESCRIPTION - ORIENTATION: ORIENTATION: LOWER

## 2024-11-11 ASSESSMENT — PAIN DESCRIPTION - LOCATION: LOCATION: BACK

## 2024-11-11 ASSESSMENT — PAIN DESCRIPTION - DESCRIPTORS: DESCRIPTORS: ACHING

## 2024-11-11 ASSESSMENT — PAIN SCALES - GENERAL: PAINLEVEL_OUTOF10: 3

## 2024-11-12 ENCOUNTER — APPOINTMENT (OUTPATIENT)
Dept: GENERAL RADIOLOGY | Age: 79
End: 2024-11-12
Attending: INTERNAL MEDICINE
Payer: COMMERCIAL

## 2024-11-12 ENCOUNTER — APPOINTMENT (OUTPATIENT)
Age: 79
End: 2024-11-12
Attending: INTERNAL MEDICINE
Payer: COMMERCIAL

## 2024-11-12 ENCOUNTER — TELEPHONE (OUTPATIENT)
Dept: FAMILY MEDICINE CLINIC | Age: 79
End: 2024-11-12

## 2024-11-12 LAB
ALBUMIN SERPL-MCNC: 3.3 G/DL (ref 3.5–5.2)
ALP SERPL-CCNC: 89 U/L (ref 35–104)
ALT SERPL-CCNC: 7 U/L (ref 0–32)
ANION GAP SERPL CALCULATED.3IONS-SCNC: 11 MMOL/L (ref 7–16)
ANION GAP SERPL CALCULATED.3IONS-SCNC: 8 MMOL/L (ref 7–16)
AST SERPL-CCNC: 12 U/L (ref 0–31)
B PARAP IS1001 DNA NPH QL NAA+NON-PROBE: NOT DETECTED
B PERT DNA SPEC QL NAA+PROBE: NOT DETECTED
B.E.: 11.6 MMOL/L (ref -3–3)
BASOPHILS # BLD: 0 K/UL (ref 0–0.2)
BASOPHILS # BLD: 0 K/UL (ref 0–0.2)
BASOPHILS NFR BLD: 0 % (ref 0–2)
BASOPHILS NFR BLD: 0 % (ref 0–2)
BILIRUB SERPL-MCNC: 0.2 MG/DL (ref 0–1.2)
BNP SERPL-MCNC: 984 PG/ML (ref 0–450)
BUN SERPL-MCNC: 23 MG/DL (ref 6–23)
BUN SERPL-MCNC: 24 MG/DL (ref 6–23)
C PNEUM DNA NPH QL NAA+NON-PROBE: NOT DETECTED
CALCIUM SERPL-MCNC: 8.8 MG/DL (ref 8.6–10.2)
CALCIUM SERPL-MCNC: 9 MG/DL (ref 8.6–10.2)
CHLORIDE SERPL-SCNC: 91 MMOL/L (ref 98–107)
CHLORIDE SERPL-SCNC: 93 MMOL/L (ref 98–107)
CO2 SERPL-SCNC: 36 MMOL/L (ref 22–29)
CO2 SERPL-SCNC: 38 MMOL/L (ref 22–29)
COHB: 0.3 % (ref 0–1.5)
CREAT SERPL-MCNC: 0.8 MG/DL (ref 0.5–1)
CREAT SERPL-MCNC: 0.8 MG/DL (ref 0.5–1)
CRITICAL: ABNORMAL
DATE ANALYZED: ABNORMAL
DATE OF COLLECTION: ABNORMAL
EOSINOPHIL # BLD: 0 K/UL (ref 0.05–0.5)
EOSINOPHIL # BLD: 0 K/UL (ref 0.05–0.5)
EOSINOPHILS RELATIVE PERCENT: 0 % (ref 0–6)
EOSINOPHILS RELATIVE PERCENT: 0 % (ref 0–6)
ERYTHROCYTE [DISTWIDTH] IN BLOOD BY AUTOMATED COUNT: 14.3 % (ref 11.5–15)
ERYTHROCYTE [DISTWIDTH] IN BLOOD BY AUTOMATED COUNT: 14.4 % (ref 11.5–15)
FLUAV RNA NPH QL NAA+NON-PROBE: NOT DETECTED
FLUBV RNA NPH QL NAA+NON-PROBE: NOT DETECTED
FOLATE SERPL-MCNC: >20 NG/ML (ref 4.8–24.2)
GFR, ESTIMATED: 77 ML/MIN/1.73M2
GFR, ESTIMATED: 79 ML/MIN/1.73M2
GLUCOSE SERPL-MCNC: 106 MG/DL (ref 74–99)
GLUCOSE SERPL-MCNC: 106 MG/DL (ref 74–99)
HADV DNA NPH QL NAA+NON-PROBE: NOT DETECTED
HCO3: 37.1 MMOL/L (ref 22–26)
HCOV 229E RNA NPH QL NAA+NON-PROBE: NOT DETECTED
HCOV HKU1 RNA NPH QL NAA+NON-PROBE: NOT DETECTED
HCOV NL63 RNA NPH QL NAA+NON-PROBE: NOT DETECTED
HCOV OC43 RNA NPH QL NAA+NON-PROBE: NOT DETECTED
HCT VFR BLD AUTO: 24.7 % (ref 34–48)
HCT VFR BLD AUTO: 27.9 % (ref 34–48)
HCT VFR BLD AUTO: 31 % (ref 34–48)
HGB BLD-MCNC: 7 G/DL (ref 11.5–15.5)
HGB BLD-MCNC: 8.3 G/DL (ref 11.5–15.5)
HGB BLD-MCNC: 9.2 G/DL (ref 11.5–15.5)
HHB: 4.6 % (ref 0–5)
HMPV RNA NPH QL NAA+NON-PROBE: NOT DETECTED
HPIV1 RNA NPH QL NAA+NON-PROBE: NOT DETECTED
HPIV2 RNA NPH QL NAA+NON-PROBE: NOT DETECTED
HPIV3 RNA NPH QL NAA+NON-PROBE: NOT DETECTED
HPIV4 RNA NPH QL NAA+NON-PROBE: NOT DETECTED
LAB: ABNORMAL
LYMPHOCYTES NFR BLD: 0.33 K/UL (ref 1.5–4)
LYMPHOCYTES NFR BLD: 0.4 K/UL (ref 1.5–4)
LYMPHOCYTES RELATIVE PERCENT: 7 % (ref 20–42)
LYMPHOCYTES RELATIVE PERCENT: 7 % (ref 20–42)
Lab: 1447
M PNEUMO DNA NPH QL NAA+NON-PROBE: NOT DETECTED
MAGNESIUM SERPL-MCNC: 2.1 MG/DL (ref 1.6–2.6)
MAGNESIUM SERPL-MCNC: 2.1 MG/DL (ref 1.6–2.6)
MCH RBC QN AUTO: 29.8 PG (ref 26–35)
MCH RBC QN AUTO: 30.3 PG (ref 26–35)
MCHC RBC AUTO-ENTMCNC: 28.3 G/DL (ref 32–34.5)
MCHC RBC AUTO-ENTMCNC: 29.7 G/DL (ref 32–34.5)
MCV RBC AUTO: 102 FL (ref 80–99.9)
MCV RBC AUTO: 105.1 FL (ref 80–99.9)
METHB: 0.3 % (ref 0–1.5)
MODE: ABNORMAL
MONOCYTES NFR BLD: 0.13 K/UL (ref 0.1–0.95)
MONOCYTES NFR BLD: 0.2 K/UL (ref 0.1–0.95)
MONOCYTES NFR BLD: 3 % (ref 2–12)
MONOCYTES NFR BLD: 4 % (ref 2–12)
NEUTROPHILS NFR BLD: 90 % (ref 43–80)
NEUTROPHILS NFR BLD: 90 % (ref 43–80)
NEUTS SEG NFR BLD: 4.34 K/UL (ref 1.8–7.3)
NEUTS SEG NFR BLD: 5.19 K/UL (ref 1.8–7.3)
O2 SATURATION: 95.4 % (ref 92–98.5)
O2HB: 94.8 % (ref 94–97)
OPERATOR ID: 5134
PATIENT TEMP: 37 C
PCO2: 53.8 MMHG (ref 35–45)
PH BLOOD GAS: 7.46 (ref 7.35–7.45)
PHOSPHATE SERPL-MCNC: 2 MG/DL (ref 2.5–4.5)
PHOSPHATE SERPL-MCNC: 3.6 MG/DL (ref 2.5–4.5)
PLATELET # BLD AUTO: 223 K/UL (ref 130–450)
PLATELET # BLD AUTO: 320 K/UL (ref 130–450)
PMV BLD AUTO: 10.1 FL (ref 7–12)
PMV BLD AUTO: 10.3 FL (ref 7–12)
PO2: 78.4 MMHG (ref 75–100)
POTASSIUM SERPL-SCNC: 3.8 MMOL/L (ref 3.5–5)
POTASSIUM SERPL-SCNC: 4.4 MMOL/L (ref 3.5–5)
PROCALCITONIN SERPL-MCNC: 0.06 NG/ML (ref 0–0.08)
PROT SERPL-MCNC: 6.8 G/DL (ref 6.4–8.3)
RBC # BLD AUTO: 2.35 M/UL (ref 3.5–5.5)
RBC # BLD AUTO: 3.04 M/UL (ref 3.5–5.5)
RBC # BLD: ABNORMAL 10*6/UL
RSV RNA NPH QL NAA+NON-PROBE: NOT DETECTED
RV+EV RNA NPH QL NAA+NON-PROBE: NOT DETECTED
SARS-COV-2 RNA NPH QL NAA+NON-PROBE: NOT DETECTED
SODIUM SERPL-SCNC: 138 MMOL/L (ref 132–146)
SODIUM SERPL-SCNC: 139 MMOL/L (ref 132–146)
SOURCE, BLOOD GAS: ABNORMAL
SPECIMEN DESCRIPTION: NORMAL
T4 FREE SERPL-MCNC: 1 NG/DL (ref 0.9–1.7)
THB: 10.2 G/DL (ref 11.5–16.5)
TIME ANALYZED: 1449
TROPONIN I SERPL HS-MCNC: 11 NG/L (ref 0–9)
TROPONIN I SERPL HS-MCNC: 13 NG/L (ref 0–9)
TSH SERPL DL<=0.05 MIU/L-ACNC: 0.37 UIU/ML (ref 0.27–4.2)
VIT B12 SERPL-MCNC: 499 PG/ML (ref 211–946)
WBC # BLD: ABNORMAL 10*3/UL
WBC # BLD: ABNORMAL 10*3/UL
WBC OTHER # BLD: 4.8 K/UL (ref 4.5–11.5)
WBC OTHER # BLD: 5.8 K/UL (ref 4.5–11.5)

## 2024-11-12 PROCEDURE — 85014 HEMATOCRIT: CPT

## 2024-11-12 PROCEDURE — 6370000000 HC RX 637 (ALT 250 FOR IP): Performed by: NURSE PRACTITIONER

## 2024-11-12 PROCEDURE — 99291 CRITICAL CARE FIRST HOUR: CPT | Performed by: INTERNAL MEDICINE

## 2024-11-12 PROCEDURE — 2580000003 HC RX 258

## 2024-11-12 PROCEDURE — 84439 ASSAY OF FREE THYROXINE: CPT

## 2024-11-12 PROCEDURE — 85018 HEMOGLOBIN: CPT

## 2024-11-12 PROCEDURE — 2060000000 HC ICU INTERMEDIATE R&B

## 2024-11-12 PROCEDURE — 84100 ASSAY OF PHOSPHORUS: CPT

## 2024-11-12 PROCEDURE — 93005 ELECTROCARDIOGRAM TRACING: CPT | Performed by: NURSE PRACTITIONER

## 2024-11-12 PROCEDURE — 99222 1ST HOSP IP/OBS MODERATE 55: CPT

## 2024-11-12 PROCEDURE — 6370000000 HC RX 637 (ALT 250 FOR IP)

## 2024-11-12 PROCEDURE — 2500000003 HC RX 250 WO HCPCS

## 2024-11-12 PROCEDURE — 84484 ASSAY OF TROPONIN QUANT: CPT

## 2024-11-12 PROCEDURE — 6360000002 HC RX W HCPCS

## 2024-11-12 PROCEDURE — 85025 COMPLETE CBC W/AUTO DIFF WBC: CPT

## 2024-11-12 PROCEDURE — 82805 BLOOD GASES W/O2 SATURATION: CPT

## 2024-11-12 PROCEDURE — 2700000000 HC OXYGEN THERAPY PER DAY

## 2024-11-12 PROCEDURE — 80048 BASIC METABOLIC PNL TOTAL CA: CPT

## 2024-11-12 PROCEDURE — 83880 ASSAY OF NATRIURETIC PEPTIDE: CPT

## 2024-11-12 PROCEDURE — 94640 AIRWAY INHALATION TREATMENT: CPT

## 2024-11-12 PROCEDURE — 0202U NFCT DS 22 TRGT SARS-COV-2: CPT

## 2024-11-12 PROCEDURE — 84443 ASSAY THYROID STIM HORMONE: CPT

## 2024-11-12 PROCEDURE — 83735 ASSAY OF MAGNESIUM: CPT

## 2024-11-12 PROCEDURE — 2580000003 HC RX 258: Performed by: INTERNAL MEDICINE

## 2024-11-12 PROCEDURE — 84145 PROCALCITONIN (PCT): CPT

## 2024-11-12 PROCEDURE — 6360000002 HC RX W HCPCS: Performed by: INTERNAL MEDICINE

## 2024-11-12 PROCEDURE — 80053 COMPREHEN METABOLIC PANEL: CPT

## 2024-11-12 PROCEDURE — 87081 CULTURE SCREEN ONLY: CPT

## 2024-11-12 PROCEDURE — 94660 CPAP INITIATION&MGMT: CPT

## 2024-11-12 PROCEDURE — 6370000000 HC RX 637 (ALT 250 FOR IP): Performed by: INTERNAL MEDICINE

## 2024-11-12 PROCEDURE — 99223 1ST HOSP IP/OBS HIGH 75: CPT | Performed by: INTERNAL MEDICINE

## 2024-11-12 PROCEDURE — 82607 VITAMIN B-12: CPT

## 2024-11-12 PROCEDURE — 82746 ASSAY OF FOLIC ACID SERUM: CPT

## 2024-11-12 PROCEDURE — 5A09357 ASSISTANCE WITH RESPIRATORY VENTILATION, LESS THAN 24 CONSECUTIVE HOURS, CONTINUOUS POSITIVE AIRWAY PRESSURE: ICD-10-PCS | Performed by: INTERNAL MEDICINE

## 2024-11-12 PROCEDURE — 71045 X-RAY EXAM CHEST 1 VIEW: CPT

## 2024-11-12 PROCEDURE — 93306 TTE W/DOPPLER COMPLETE: CPT

## 2024-11-12 RX ORDER — CLOPIDOGREL BISULFATE 75 MG/1
75 TABLET ORAL DAILY
Status: DISCONTINUED | OUTPATIENT
Start: 2024-11-12 | End: 2024-11-15 | Stop reason: HOSPADM

## 2024-11-12 RX ORDER — ASPIRIN 81 MG/1
81 TABLET ORAL DAILY
Status: DISCONTINUED | OUTPATIENT
Start: 2024-11-12 | End: 2024-11-13

## 2024-11-12 RX ORDER — METOPROLOL TARTRATE 1 MG/ML
INJECTION, SOLUTION INTRAVENOUS
Status: COMPLETED
Start: 2024-11-12 | End: 2024-11-12

## 2024-11-12 RX ORDER — METOPROLOL TARTRATE 1 MG/ML
5 INJECTION, SOLUTION INTRAVENOUS ONCE
Status: COMPLETED | OUTPATIENT
Start: 2024-11-12 | End: 2024-11-12

## 2024-11-12 RX ORDER — DIGOXIN 0.25 MG/ML
250 INJECTION INTRAMUSCULAR; INTRAVENOUS EVERY 6 HOURS
Status: COMPLETED | OUTPATIENT
Start: 2024-11-12 | End: 2024-11-12

## 2024-11-12 RX ORDER — DILTIAZEM HYDROCHLORIDE 5 MG/ML
10 INJECTION INTRAVENOUS ONCE
Status: COMPLETED | OUTPATIENT
Start: 2024-11-12 | End: 2024-11-12

## 2024-11-12 RX ORDER — SODIUM CHLORIDE 0.9 % (FLUSH) 0.9 %
5-40 SYRINGE (ML) INJECTION EVERY 12 HOURS SCHEDULED
Status: DISCONTINUED | OUTPATIENT
Start: 2024-11-12 | End: 2024-11-15 | Stop reason: HOSPADM

## 2024-11-12 RX ORDER — ARFORMOTEROL TARTRATE 15 UG/2ML
15 SOLUTION RESPIRATORY (INHALATION)
Status: DISCONTINUED | OUTPATIENT
Start: 2024-11-12 | End: 2024-11-15 | Stop reason: HOSPADM

## 2024-11-12 RX ORDER — SODIUM CHLORIDE 0.9 % (FLUSH) 0.9 %
5-40 SYRINGE (ML) INJECTION PRN
Status: DISCONTINUED | OUTPATIENT
Start: 2024-11-12 | End: 2024-11-15 | Stop reason: HOSPADM

## 2024-11-12 RX ORDER — METOPROLOL SUCCINATE 50 MG/1
50 TABLET, EXTENDED RELEASE ORAL DAILY
Status: DISCONTINUED | OUTPATIENT
Start: 2024-11-12 | End: 2024-11-15 | Stop reason: HOSPADM

## 2024-11-12 RX ORDER — SODIUM CHLORIDE FOR INHALATION 0.9 %
3 VIAL, NEBULIZER (ML) INHALATION EVERY 8 HOURS PRN
Status: DISCONTINUED | OUTPATIENT
Start: 2024-11-12 | End: 2024-11-12

## 2024-11-12 RX ORDER — SODIUM CHLORIDE 9 MG/ML
INJECTION, SOLUTION INTRAVENOUS PRN
Status: DISCONTINUED | OUTPATIENT
Start: 2024-11-12 | End: 2024-11-15 | Stop reason: HOSPADM

## 2024-11-12 RX ORDER — LEVALBUTEROL INHALATION SOLUTION 0.63 MG/3ML
0.63 SOLUTION RESPIRATORY (INHALATION) EVERY 8 HOURS PRN
Status: DISCONTINUED | OUTPATIENT
Start: 2024-11-12 | End: 2024-11-12

## 2024-11-12 RX ORDER — POTASSIUM CHLORIDE 7.45 MG/ML
10 INJECTION INTRAVENOUS
Status: DISCONTINUED | OUTPATIENT
Start: 2024-11-12 | End: 2024-11-12

## 2024-11-12 RX ORDER — GUAIFENESIN 400 MG/1
400 TABLET ORAL 3 TIMES DAILY
Status: DISCONTINUED | OUTPATIENT
Start: 2024-11-12 | End: 2024-11-15 | Stop reason: HOSPADM

## 2024-11-12 RX ORDER — LEVALBUTEROL INHALATION SOLUTION 1.25 MG/3ML
1.25 SOLUTION RESPIRATORY (INHALATION) EVERY 8 HOURS
Status: DISCONTINUED | OUTPATIENT
Start: 2024-11-12 | End: 2024-11-14

## 2024-11-12 RX ADMIN — METOPROLOL TARTRATE 5 MG: 1 INJECTION, SOLUTION INTRAVENOUS at 04:54

## 2024-11-12 RX ADMIN — POTASSIUM PHOSPHATE, MONOBASIC AND POTASSIUM PHOSPHATE, DIBASIC 15 MMOL: 224; 236 INJECTION, SOLUTION, CONCENTRATE INTRAVENOUS at 03:05

## 2024-11-12 RX ADMIN — LEVALBUTEROL 1.25 MG: 1.25 SOLUTION RESPIRATORY (INHALATION) at 20:56

## 2024-11-12 RX ADMIN — SODIUM CHLORIDE, PRESERVATIVE FREE 10 ML: 5 INJECTION INTRAVENOUS at 20:30

## 2024-11-12 RX ADMIN — METOPROLOL SUCCINATE 50 MG: 50 TABLET, EXTENDED RELEASE ORAL at 11:33

## 2024-11-12 RX ADMIN — ARFORMOTEROL TARTRATE 15 MCG: 15 SOLUTION RESPIRATORY (INHALATION) at 20:56

## 2024-11-12 RX ADMIN — GUAIFENESIN 400 MG: 400 TABLET ORAL at 14:58

## 2024-11-12 RX ADMIN — DIGOXIN 250 MCG: 0.25 INJECTION INTRAMUSCULAR; INTRAVENOUS at 14:57

## 2024-11-12 RX ADMIN — WATER 40 MG: 1 INJECTION INTRAMUSCULAR; INTRAVENOUS; SUBCUTANEOUS at 14:58

## 2024-11-12 RX ADMIN — SODIUM CHLORIDE 15 MG/HR: 900 INJECTION, SOLUTION INTRAVENOUS at 15:06

## 2024-11-12 RX ADMIN — METOPROLOL TARTRATE 5 MG: 1 INJECTION, SOLUTION INTRAVENOUS at 04:28

## 2024-11-12 RX ADMIN — METOPROLOL TARTRATE 5 MG: 1 INJECTION, SOLUTION INTRAVENOUS at 04:45

## 2024-11-12 RX ADMIN — ASPIRIN 81 MG: 81 TABLET, COATED ORAL at 10:40

## 2024-11-12 RX ADMIN — GUAIFENESIN 400 MG: 400 TABLET ORAL at 20:29

## 2024-11-12 RX ADMIN — SODIUM CHLORIDE, PRESERVATIVE FREE 10 ML: 5 INJECTION INTRAVENOUS at 00:34

## 2024-11-12 RX ADMIN — DILTIAZEM HYDROCHLORIDE 10 MG: 5 INJECTION, SOLUTION INTRAVENOUS at 05:13

## 2024-11-12 RX ADMIN — SODIUM CHLORIDE, PRESERVATIVE FREE 10 ML: 5 INJECTION INTRAVENOUS at 07:14

## 2024-11-12 RX ADMIN — BUDESONIDE 500 MCG: 0.5 SUSPENSION RESPIRATORY (INHALATION) at 08:56

## 2024-11-12 RX ADMIN — SODIUM CHLORIDE 2.5 MG/HR: 900 INJECTION, SOLUTION INTRAVENOUS at 05:17

## 2024-11-12 RX ADMIN — IPRATROPIUM BROMIDE AND ALBUTEROL SULFATE 1 DOSE: 2.5; .5 SOLUTION RESPIRATORY (INHALATION) at 08:56

## 2024-11-12 RX ADMIN — CLOPIDOGREL BISULFATE 75 MG: 75 TABLET ORAL at 10:40

## 2024-11-12 RX ADMIN — DIGOXIN 250 MCG: 0.25 INJECTION INTRAMUSCULAR; INTRAVENOUS at 20:29

## 2024-11-12 RX ADMIN — BUDESONIDE 500 MCG: 0.5 SUSPENSION RESPIRATORY (INHALATION) at 20:56

## 2024-11-12 ASSESSMENT — ENCOUNTER SYMPTOMS
COUGH: 1
SINUS PAIN: 0
EYE ITCHING: 0
ABDOMINAL PAIN: 0
SORE THROAT: 0
COLOR CHANGE: 0
SHORTNESS OF BREATH: 1
SINUS PRESSURE: 0
DIARRHEA: 0
WHEEZING: 0
EYE DISCHARGE: 0
EYES NEGATIVE: 1
NAUSEA: 0
EYE PAIN: 0
CHEST TIGHTNESS: 0
COUGH: 0
EYE REDNESS: 1
VOMITING: 0
CONSTIPATION: 0
BACK PAIN: 0

## 2024-11-12 NOTE — PROGRESS NOTES
Patient admitted to MICU with the following belongings:  Pants. The following belongings admitted with the patient, None, were sent home with the patient's family.     4 Eyes Skin Assessment     NAME:  Compa Obrien  YOB: 1945  MEDICAL RECORD NUMBER:  [id number]    The patient is being assessed for  Admission    I agree that at least one RN has performed a thorough Head to Toe Skin Assessment on the patient. ALL assessment sites listed below have been assessed.      Areas assessed by both nurses:    Head, Face, Ears, Shoulders, Back, Chest, Arms, Elbows, Hands, Sacrum. Buttock, Coccyx, Ischium, Legs. Feet and Heels, and Under Medical Devices         Does the Patient have a Wound? No noted wound(s)       Joe Prevention initiated by RN: Yes  Wound Care Orders initiated by RN: No    Pressure Injury (Stage 3,4, Unstageable, DTI, NWPT, and Complex wounds) if present, place Wound referral order by RN under : No    New Ostomies, if present place, Ostomy referral order under : No     Nurse 1 eSignature: Electronically signed by Maru Caldwell RN on 11/11/24 at 9:41 PM EST    **SHARE this note so that the co-signing nurse can place an eSignature**    Nurse 2 eSignature: Electronically signed by Joleen Finley RN on 11/11/24 at 9:55 PM EST

## 2024-11-12 NOTE — TELEPHONE ENCOUNTER
Pt's daughter called and wants to notify us that the pt is in the hospital.     Electronically signed by KAM AREVALO MA on 11/12/24 at 3:50 PM EST

## 2024-11-12 NOTE — CONSULTS
[facility]  Internal Medicine Residency Program  Consult  MICU    Patient:  Compa Obrien 78 y.o. female MRN: [id number]     Date of Service: 11/11/2024    Hospital Day: 1      Chief complaint: COPD exacerbation   History of Present Illness   The patient is a 78 y.o. female with past medical history of COPD on baseline 5 L of O2 at home and BIPAP nightly- noncompliant, primary hypertension, Hx of infrarenal AAA, CAD s/p LAD stent on 8/24 was transferred from [facility] ICU due to COPD exacerbation and orbital floor fracture secondary to fall. She presented to [place] ED on morning of 11/10/24 via EMS called by daughter who noticed a bruise on her eye. According to the patient she had fallen twice on the floor in 24 hours. The patient stated she tripped over her oxygen/nasal cannula in her kitchen and fell on the floor hitting her head. She did not lose consciousness during the first fall but sustained a large bruise on her right eye. She fell again the next day due to standing up too quickly and lost consciousness for few minutes. She takes Plavix and her last dose was on Friday 11/09. On admission to [place]  vitals were normal, lab work was pertinent for elevated ESR and CRP, ABG showed significant respiratory acidosis, pH 7.23, pCO2 84.1, pO2 160, HCO3 39. Alk phos 105, Troponin 14, Pro BNP 2059.   Orbital and Facial CT was significant for Acute fracture of the floor of the right orbit extending along the anterior and lateral walls of the right maxillary sinus. CT chest showed a small right lower lobe infiltrate and pleural effusion. CTAP showed a large proximal abdominal arotic aneurysm measuring 6.6 x 7.0 cm with large lateral mural thrombus. CT head, lumbar and cervical spine were normal. Se was transferred to [place] MICU due to worsening acute on chronic hypercapnic respiratory failure and transferred to [facility] as she desaturated on BiPAP and was switched to AVAPS and  ventricular rate of 158 bpm     Plan;  - continuous telemetry monitoring  - Follow repeat ECHO  - Follow T4  - on continuous IV Diltiazem at rate of 2.5 mg/hr    Trauma     Right orbital fracture 2/2 fall   - Patient fell on the kitchen floor on the right side of her   - developed right eye ecchymosis and subconjunctival hemorrhage  - vision is intact, denies any pain with eye movement  - CT facial and orbit showed acute fracture of the floor of the right orbit extending along the anterior and lateral walls of the right maxillary sinus. No blowout fracture. There is evidence right maxillary sinusitis     Plan:  - Consulted Oral ( Maxillofacial) surgery - follow recs  - Consulted Ophthalmology - follows recs   - Hold Anticoagulation for possible procedure         Hematology     Macrocytic anemia   - Hb 9.2,   - Hb dropped down to 7  - Follow repeat H&H   - Follow CT head wo contrast         PT/OT evaluation: Not indicated at this time   DVT prophylaxis/ GI prophylaxis: PCD/Not indicated   [facility]    Dre Salmon MD, PGY-1  Attending physician: Dr. Yeh      [facility]  Department of Pulmonary, Critical Care and Sleep Medicine  [facility]  Department of Internal Medicine      During multidisciplinary team rounds Compa Obrien is a 78 y.o. female was seen, examined and discussed. This is confirmation that I have personally seen and examined the patient and that the key elements of the encounter were performed by me (> 85 % time).  The medications & laboratory data was discussed and adjusted where necessary. The radiographic images were reviewed or with radiologist or consultant if felt dis-concordant with the exam or history. The above findings were corroborated, plans confirmed and changes made if needed.     Family is updated at the bedside as available. Key issues of the case were discussed among consultants.  Critical

## 2024-11-12 NOTE — CARE COORDINATION
Care Coordination: LOS 1 day.  Tx from [facility]. Fell at home, orbital floor fracture- on Plavix and asa from recent PCI/[facility] 8/30/24.  Acute on chronic resp failure 2/2 COPD, uses 5 ltr nc baseline at home. Ophthalmology consulted.  Pt on bipap, call to daughter Winifred, pt lives alone, daughter lives next door.  Independent, declined life alert from insurance company. Hx of [facility] [place] and hx of [facility]- agency unknown. Pt uses 5 ltr 02 baseline and [facility] Follows with Dr Alvarado. Pt had declined hhc in recent past as she did not want anyone in her house, but daughter will discuss with pt regarding SN, ptx and otx- no preference of agency- as long as covered. Referral made to [facility] from [facility]- she will review for acceptance    Electronically signed by Cheyanne Aguilar RN on 11/12/2024 at 10:12 AM       ADDENDUM[facility], [facility] to [facility].  Electronically signed by Cheyanne Aguilar RN on 11/12/2024 at 1:39 PM     ADDENDUM: [facility] accepted, SOC 11/17/24  Electronically signed by Cheyanne Aguilar RN on 11/12/2024 at 2:28 PM       The Plan for Transition of Care is related to the following treatment goals: dc    The Patient and/or patient representative kathy Vitale was provided with a choice of provider and agrees   with the discharge plan. [x] Yes [] No    Freedom of choice list was provided with basic dialogue that supports the patient's individualized plan of care/goals, treatment preferences and shares the quality data associated with the providers. [x] Yes [] No

## 2024-11-12 NOTE — PLAN OF CARE
Problem: Chronic Conditions and Co-morbidities  Goal: Patient's chronic conditions and co-morbidity symptoms are monitored and maintained or improved  11/12/2024 0847 by Carmen Ramirez RN  Outcome: Progressing  Flowsheets (Taken 11/12/2024 0800)  Care Plan - Patient's Chronic Conditions and Co-Morbidity Symptoms are Monitored and Maintained or Improved:   Monitor and assess patient's chronic conditions and comorbid symptoms for stability, deterioration, or improvement   Collaborate with multidisciplinary team to address chronic and comorbid conditions and prevent exacerbation or deterioration   Update acute care plan with appropriate goals if chronic or comorbid symptoms are exacerbated and prevent overall improvement and discharge  11/11/2024 2221 by Maru Caldwell RN  Outcome: Progressing     Problem: Discharge Planning  Goal: Discharge to home or other facility with appropriate resources  11/12/2024 0847 by Carmen Ramirez RN  Outcome: Progressing  Flowsheets (Taken 11/12/2024 0800)  Discharge to home or other facility with appropriate resources:   Identify barriers to discharge with patient and caregiver   Arrange for needed discharge resources and transportation as appropriate   Identify discharge learning needs (meds, wound care, etc)   Arrange for interpreters to assist at discharge as needed   Refer to discharge planning if patient needs post-hospital services based on physician order or complex needs related to functional status, cognitive ability or social support system  11/11/2024 2221 by Maru Caldwell, RN  Outcome: Progressing     Problem: Skin/Tissue Integrity  Goal: Absence of new skin breakdown  Description: 1.  Monitor for areas of redness and/or skin breakdown  2.  Assess vascular access sites hourly  3.  Every 4-6 hours minimum:  Change oxygen saturation probe site  4.  Every 4-6 hours:  If on nasal continuous positive airway pressure, respiratory therapy assess nares and determine need for

## 2024-11-12 NOTE — PLAN OF CARE

## 2024-11-12 NOTE — CONSULTS
Vascular Surgery Inpatient Consultation Note      Reason for Consultation:  AAA    HISTORY OF PRESENT ILLNESS:                The patient is a 78 y.o. female who is admitted to the hospital for treatment of fall causing a right orbital fracture.  Pt was originally admitted to [facility] but transferred here for COPD exacerbation and orbital floor fracture.  While at [facility] multiple scans were done.  CTA abdomen was done showing a 7 cm AAA.  Imaging not available for review, only the results.  Pt was aware she had the AAA and is currently following with vascular surgery at [facility].  She recently saw cardiology for cardiac clearance for AAA intervention.  She does not want surgery with us but would like to go back to [place] for AAA repair.  Vascular surgery is consulted for evaluation and treatment of AAA.    IMPRESSION:  7 cm AAA    RECOMMENDATIONS:  Discussed with the pt she does have a large AAA and repair is recommended.  Pt states she follows at [facility] and would like to continue to follow there for surgery.  She does not want to have surgery at this facility.  I explained to the pt if she were to develop any sudden sharp abdominal pain or back pain she should present to the ED immediately.  She understands the risk of rupture without intervention, including death.  Pt states she will follow up with vascular surgery at [facility] for intervention.      Patient Active Problem List   Diagnosis Code    Hypertension I10    Chronic obstructive pulmonary disease (HCC) J44.9    Mixed hyperlipidemia E78.2    Chronic systolic congestive heart failure (HCC) I50.22    H/O: CVA (cerebrovascular accident) Z86.73    History of coronary angioplasty with insertion of stent Z95.5    H/O peptic ulcer Z87.11    H/O: GI bleed Z87.19    Infrarenal abdominal aortic aneurysm (AAA) without rupture ([facility]) I71.43    Orbital floor (blow-out) closed fracture S02.30XA    Fracture of orbital floor, unspecified side, initial encounter             Headaches:   No [x]/Yes []  Hematologic, lymphatic:   Anemia:   No [x]/Yes []              Bleeding or bruising:  No [x]/Yes []              Fevers or chills: No [x]/Yes []  Endocrine:             Temp intolerance:   No [x]/Yes []                       Polydipsia, polyuria:  No [x]/Yes []  Skin:              Rash:    No [x]/Yes []      Ulcers:   No [x]/Yes []              Abnorm pigment: No [x]/Yes []  :              Frequency/urgency:  No [x]/Yes []      Hematuria:    No [x]/Yes []                      Incontinence:    No [x]/Yes []    PHYSICAL EXAM:  Vitals:    11/12/24 1133   BP: 112/82   Pulse: (!) 155   Resp:    Temp:    SpO2:      General Appearance: alert and oriented to person, place and time, well developed and well- nourished, in no acute distress  Skin: warm and dry, no rash or erythema  Head: normocephalic and atraumatic  Eyes: extraocular eye movements intact, conjunctivae normal  ENT: external ear and ear canal normal bilaterally, nose without deformity  Pulmonary/Chest: normal air movement, no respiratory distress  Cardiovascular: normal rate, regular rhythm  Abdomen: soft, non-tender, non-distended, no masses or organomegaly, aorta palpable  Musculoskeletal: normal range of motion, no joint swelling, deformity or tenderness  Neurologic: no cranial nerve deficit, gait, coordination and speech normal  Extremities: trace leg edema bilaterally    PULSE EXAM      Right      Left   Brachial     Radial     Femoral     Popliteal     Dorsalis Pedis 2 2   Posterior Tibial     (3=normal, 2=diminished, 1=barely palpable, 4=widened)      LABS:    Lab Results   Component Value Date    WBC 4.8 11/12/2024    HGB 8.3 (L) 11/12/2024    HCT 27.9 (L) 11/12/2024     11/12/2024    K 4.4 11/12/2024    BUN 24 (H) 11/12/2024    CREATININE 0.8 11/12/2024       RADIOLOGY:    CT results from [place] reviewed

## 2024-11-12 NOTE — CARE COORDINATION
Case Management Assessment  Initial Evaluation    Date/Time of Evaluation: 11/12/2024 10:14 AM  Assessment Completed by: Cheyanne Aguilar RN    If patient is discharged prior to next notation, then this note serves as note for discharge by case management.    Patient Name: Compa Obrien                   YOB: 1945  Diagnosis: Orbital floor (blow-out) closed fracture [S02.30XA]  Fracture of orbital floor, unspecified side, initial encounter for closed fracture [S02.30XA]                   Date / Time: 11/11/2024  8:42 PM    Patient Admission Status: Inpatient   Readmission Risk (Low < 19, Mod (19-27), High > 27): Readmission Risk Score: 14.9    Current PCP: Nigel Alvarado, DO  PCP verified by CM? Yes    Chart Reviewed: Yes      History Provided by: Child/Family  Patient Orientation: Alert and Oriented    Patient Cognition: Alert    Hospitalization in the last 30 days (Readmission):  Yes    If yes, Readmission Assessment in CM Navigator will be completed.    Advance Directives:      Code Status: Full Code   Patient's Primary Decision Maker is: Legal Next of Kin    Primary Decision Maker: Winifred Mock  Child - [phone]    Discharge Planning:    Patient lives with: Alone Type of Home: House  Primary Care Giver: Self  Patient Support Systems include: Children   Current Financial resources:    Current community resources:    Current services prior to admission: Oxygen Therapy            Current DME:              Type of Home Care services:  None    ADLS  Prior functional level: Independent in ADLs/IADLs  Current functional level: Other (see comment) (ICU patient)    PT AM-PAC:   /24  OT AM-PAC:   /24    Family can provide assistance at DC: Yes  Would you like Case Management to discuss the discharge plan with any other family members/significant others, and if so, who? Yes (next of kin)  Plans to Return to Present Housing: Unknown at present  Other Identified Issues/Barriers to RETURNING  to current housing:   Potential Assistance needed at discharge: N/A            Potential DME:    Patient expects to discharge to: House  Plan for transportation at discharge:      Financial    Payor: [employer] / Plan: [employer] / Product Type: *No Product type* /     Does insurance require precert for SNF: Yes    Potential assistance Purchasing Medications:    Meds-to-Beds request:        [id number] - [place], [place] - [address] - P [phone] - F [phone]  [address] [place] [address]  Phone: [phone] Fax: [phone]      Notes:    Factors facilitating achievement of predicted outcomes: Family support    Barriers to discharge: Medical complications    Additional Case Management Notes:     The Plan for Transition of Care is related to the following treatment goals of Orbital floor (blow-out) closed fracture [S02.30XA]  Fracture of orbital floor, unspecified side, initial encounter for closed fracture [S02.30XA]    IF APPLICABLE: The Patient and/or patient representative Compa and her family were provided with a choice of provider and agrees with the discharge plan. Freedom of choice list with basic dialogue that supports the patient's individualized plan of care/goals and shares the quality data associated with the providers was provided to:     Patient Representative Name:       The Patient and/or Patient Representative Agree with the Discharge Plan?      Cheyanne Aguilar RN  Case Management Department  Ph: [phone] Fax:

## 2024-11-12 NOTE — PROGRESS NOTES
Physical Therapy    PT consult to evaluate/treat received and appreciated.  Pt chart reviewed and evaluation attempted.  Spoke with RN.  Pt is currently awaiting further consults and POC regarding R orbital fracture.  Will check back as able.  Thank you.        Bj Holley, PT, DPT   [id number]

## 2024-11-12 NOTE — CONSULTS
Oral & Maxillofacial Surgery Consultation    Patient's Name/Date of Birth: Compa Obrien / 1945 (78 y.o.)/female    Date: November 12, 2024     HPI: Patient s/p acute COPD exacerbation with a fall from standing height. Presented with orbital floor fracture. OMFS consulted to evaluate.      Past Medical History:   Diagnosis Date    Hypertension     Stroke (HCC)         Past Surgical History:   Procedure Laterality Date    TONSILLECTOMY            Current Facility-Administered Medications:     sodium chloride flush 0.9 % injection 5-40 mL, 5-40 mL, IntraVENous, 2 times per day, Robin-Myron Boonea, DO    sodium chloride flush 0.9 % injection 5-40 mL, 5-40 mL, IntraVENous, PRN, Robin-Myron Boonea, DO    0.9 % sodium chloride infusion, , IntraVENous, PRN, Myron Stouta, DO    [COMPLETED] dilTIAZem injection 10 mg, 10 mg, IntraVENous, Once, 10 mg at 11/12/24 0513 **FOLLOWED BY** dilTIAZem 100 mg in sodium chloride 0.9 % 100 mL infusion ([facility]), 2.5-15 mg/hr, IntraVENous, Continuous, Key Tabor, APRN - CNP, Last Rate: 15 mL/hr at 11/12/24 1129, 15 mg/hr at 11/12/24 1129    aspirin EC tablet 81 mg, 81 mg, Oral, Daily, Rodrigo Cruz MD, 81 mg at 11/12/24 1040    clopidogrel (PLAVIX) tablet 75 mg, 75 mg, Oral, Daily, Rodrigo Cruz MD, 75 mg at 11/12/24 1040    metoprolol succinate (TOPROL XL) extended release tablet 50 mg, 50 mg, Oral, Daily, Dana Boudreaux, ARLINEN, 50 mg at 11/12/24 1133    arformoterol tartrate (BROVANA) nebulizer solution 15 mcg, 15 mcg, Nebulization, BID RT, Zeus Mathur DO    levalbuterol (XOPENEX) nebulizer solution 1.25 mg, 1.25 mg, Nebulization, Q8H, Zeus Mathur DO    guaiFENesin tablet 400 mg, 400 mg, Oral, TID, Zeus Mathur, DO    methylPREDNISolone sodium succ (SOLU-MEDROL) 40 mg in sterile water 1 mL injection, 40 mg, IntraVENous, Q12H, Zeus Mathur, DO    digoxin (LANOXIN) injection 250 mcg, 250 mcg, IntraVENous, Q6H,  Key Tabor APRN - CNP    ondansetron (ZOFRAN-ODT) disintegrating tablet 4 mg, 4 mg, Oral, Q8H PRN **OR** ondansetron (ZOFRAN) injection 4 mg, 4 mg, IntraVENous, Q6H PRN, Cene, Jayde, APRN - CNP    polyethylene glycol (GLYCOLAX) packet 17 g, 17 g, Oral, Daily PRN, Cene, Jayde, APRN - CNP    acetaminophen (TYLENOL) tablet 650 mg, 650 mg, Oral, Q6H PRN **OR** acetaminophen (TYLENOL) suppository 650 mg, 650 mg, Rectal, Q6H PRN, Cene, Jayde, APRN - CNP    sodium chloride flush 0.9 % injection 5-40 mL, 5-40 mL, IntraVENous, 2 times per day, Dre Salmon MD, 10 mL at 11/12/24 0714    sodium chloride flush 0.9 % injection 5-40 mL, 5-40 mL, IntraVENous, PRN, Dre Salmon MD    glucose chewable tablet 16 g, 4 tablet, Oral, PRN, Dre Salmon MD    dextrose bolus 10% 125 mL, 125 mL, IntraVENous, PRN **OR** dextrose bolus 10% 250 mL, 250 mL, IntraVENous, PRN, Dre Salmon MD    glucagon injection 1 mg, 1 mg, SubCUTAneous, PRN, rDe Salmon MD    dextrose 10 % infusion, , IntraVENous, Continuous PRN, Dre Salmon MD    budesonide (PULMICORT) nebulizer suspension 500 mcg, 0.5 mg, Nebulization, BID RT, Dre Salmon MD, 500 mcg at 11/12/24 0856    Patient has no known allergies.    No relevant family history has been documented for this patient.     Social History       Tobacco History       Smoking Status  Former Smoking Start Date  1/1/1963 Quit Date  9/17/2017 Average Packs/Day  2.0 packs/day for 54.7 years (109.4 ttl pk-yrs) Smoking Tobacco Type  Cigarettes from 1/1/1963 to 9/17/2017   Pack Year History     Packs/Day From To Years    0 9/17/2017  7.2    2 1/1/1963 9/17/2017 54.7      Smokeless Tobacco Use  Never              Alcohol History       Alcohol Use Status  Yes              Drug Use       Drug Use Status  No              Sexual Activity       Sexually Active  Not Currently                     Physical Exam:  Vitals:    11/12/24 1300   BP: 118/72   Pulse: 76   Resp: 19   Temp:    SpO2: 97%

## 2024-11-12 NOTE — H&P
[facility]  History and Physical      CHIEF COMPLAINT:    No chief complaint on file.       Patient of Nigel Alvarado DO presents with:  Orbital floor (blow-out) closed fracture    History of Present Illness:   Ms. Obrien is a 78 year old  female with a past medical history of CAD s/p PCI with PRUDENCE to the mid LAD 08/30/2024, chronic HFpEF, ANNIA with noncompliance with BiPAP, severe COPD on chronic O2, remote tobacco use, HTN, HLD, CVA in 10/2017 without reported residual deficit, infrarenal AAA, anemia, gastric ulcer 10/2017.  According to chart documentation, Ms. Obrien presented to [facility] on 11/10/2024 after suffering a mechanical fall due to tripping over her own O2 nasal cannula.  She reportedly attempted to get up and fell again with LOC from a standing position.  She was found to have a COPD exacerbation and orbital floor fracture and subsequently was transferred to [facility] for worsening acute on chronic hypercapnic respiratory failure she desaturated on BiPAP and was switched to AVAPS and only reactive to pain. Troponin 13>11, NT p-BNP 1655>984,  BUN/CR 24/0.8, , K+ 4.4, Chloride 93,  CO2 38, H/H 7/24.7>8.3/27.9, WBC 4.8, Resp Panel including COVID-19 negative. TSH 0.37, Free T4 1.0.  She was admitted to ICU on 11/11/2024.  On 11/12/2024 she was found to have new onset Atrial Fibrillation with RVR with HR in the 140s and was treated with 15mg IV lopressor followed by NS bolus, Diltiazem 10mg IV then infusion at 2.5mg/hr. Cardiology was consulted.  On evaluation resting comfortably, her breathing appears to have improved somewhat  Any chest pain no shortness of breath currently.  She did not require intubation but is at low threshold for such.    REVIEW OF SYSTEMS:  Pertinent negatives are above in HPI.  10 point ROS otherwise negative.      Past Medical History:   Diagnosis Date    Hypertension     Stroke (HCC)          Past Surgical History:   Procedure Laterality Date  year    Note that the patient's home medications were reviewed and the above list is accurate to the best of my knowledge at the time of the exam.    Allergies:    Patient has no known allergies.    Social History:    reports that she quit smoking about 7 years ago. Her smoking use included cigarettes. She started smoking about 61 years ago. She has a 109.4 pack-year smoking history. She has never used smokeless tobacco. She reports current alcohol use. She reports that she does not use drugs.    Family History:   family history includes High Blood Pressure in her father and mother; Other in her father.      PHYSICAL EXAM:    Vitals:  /68   Pulse 76   Temp 98 °F (36.7 °C) (Temporal)   Resp 19   Ht 1.473 m (4' 10\")   Wt 56.7 kg (125 lb)   LMP  (LMP Unknown)   SpO2 96%   BMI 26.13 kg/m²       General appearance: NAD, conversant interactive, somewhat short of breath  Eyes: Sclerae anicteric, PERRLA-right eye with significant ecchymoses from trauma/fall  HEENT: AT/NC, MMM  Neck: FROM, supple, no thyromegaly  Lymph: No cervical / supraclavicular lymphadenopathy  Lungs: No wheeze but markedly diminished breath sounds bilateral lung fields/ left  more diminished than right  CV: RRR, no MRGs, no lower extremity edema  Abdomen: Soft, non-tender; no masses or HSM, +BS  Extremities: FROM without synovitis.  No clubbing or cyanosis of the hands.  Skin: no rash, induration, lesions, or ulcers  Psych: Calm and cooperative.  Normal judgement and insight.  Normal mood and affect.  Neuro: Alert and interactive, face symmetric, speech fluent.    LABS:  All labs reviewed.  Of note:  CBC with Differential:    Lab Results   Component Value Date/Time    WBC 4.8 11/12/2024 04:17 AM    RBC 2.35 11/12/2024 04:17 AM    HGB 8.3 11/12/2024 05:51 AM    HCT 27.9 11/12/2024 05:51 AM     11/12/2024 04:17 AM    .1 11/12/2024 04:17 AM    MCH 29.8 11/12/2024 04:17 AM    MCHC 28.3 11/12/2024 04:17 AM    RDW 14.4 11/12/2024

## 2024-11-12 NOTE — CONSULTS
bolus 10% 250 mL, 250 mL, IntraVENous, PRN  glucagon injection 1 mg, 1 mg, SubCUTAneous, PRN  dextrose 10 % infusion, , IntraVENous, Continuous PRN  ipratropium 0.5 mg-albuterol 2.5 mg (DUONEB) nebulizer solution 1 Dose, 1 Dose, Inhalation, Q4H WA RT  budesonide (PULMICORT) nebulizer suspension 500 mcg, 0.5 mg, Nebulization, BID RT    Allergies:      Social History:    108 pack year smoker quit 2017  ETOH: occasionally will drink a beer.   No illicit drug use  Activity: Pt lives alone in a 1 story, no basement, laundry first floor, daughter lives next door and does all her shopping; pt does not drive.Pt does her own housework with assistance from daughter.  Code Status: Full Code      Family History: non-contributory      REVIEW OF SYSTEMS:   See HPI    PHYSICAL EXAM:   /66   Pulse (!) 103   Temp 98.2 °F (36.8 °C) (Temporal)   Resp 18   Ht 1.473 m (4' 10\")   Wt 56.7 kg (125 lb)   LMP  (LMP Unknown)   SpO2 91%   BMI 26.13 kg/m²     CONST:  Elderly  female; well nourished female who appears of stated age. Awake, alert and cooperative. No apparent distress.   HEENT:   Head- Normocephalic, contusion to right orbital region.  Eyes- right subconjunctiva red with orbital ecchymosis and purple discoloration   Throat- Oral mucosa pink and moist  Neck-  No stridor, trachea midline, no jugular venous distention. No carotid bruit.    CHEST: Chest symmetrical and non-tender to palpation. No accessory muscle use or intercostal retractions  RESPIRATORY: Lung diminished  CARDIOVASCULAR:     Heart Ausculation- Regular rate,  irregular rhythm , no murmur.   PV: 1+ extremity edema. No varicosities. Pedal pulses palpable, no clubbing or cyanosis   ABDOMEN: Soft, non-tender to light palpation. Bowel sounds present. No palpable masses no organomegaly; no abdominal bruit  MS: Good muscle strength and tone. No atrophy or abnormal movements.   : Deferred  SKIN: Warm and dry no statis dermatitis or ulcers. Bruising  and no apparent distress.   HEENT:   Head- Right periorbital ecchymosis  Neck-  No stridor, No carotid bruit.    CHEST: No accessory muscle use  RESPIRATORY: Lung sounds - Few rhonchi  CARDIOVASCULAR:     Heart Ausculation- Irregular rate. 1-2/6 systolic murmur. No s3    EXT: No lower extremity edema. Distal pulses palpable   ABDOMEN: Bowel sounds present. +AAA  SKIN: Warm and dry   NEURO: Oriented to person, place        IMPRESSION / RECOMMENDATIONS:     Syncope - Monitor Orthos, Monitor on Tele for weber/tachyarrhythmias. No LVOT obstruction on recent Echo. Consider out-pt Event monitor     A Fib - NEW - High [id number] VASc score - Rate control with BB and Digoxin as needed. Need OAC; once Cleared by Oral surgery and Ophthalmology start OAC with Eliquis. Risk benefits discussed. Agreeable. If recurrent falls/bleeding recommend LAAO-watchman     Orbital floor (blow-out) closed fracture from her fall/syncope - Oral surgery/Ophthol consulted     CAD - s/p PCI in 8/2024 - Continue ASA, Plavix, BB, Statin - Once she is on OAC, discontinue ASA.     AAA - Vascular surgery consulted -She was seen by vascular surgery at [facility].      Home O2     HTN     Chronic Anemia - Monitor H/H            F/u with her Cardiologist, Dr Durand, after discharge.     above d/w her - no family at bed side          Electronically signed by Estrellita Bodn MD on 11/12/2024 at 12:36 PM  [facility]

## 2024-11-12 NOTE — CONSULTS
Marcel Lew M.D.  Cristofer Hutchinson D.O.  Jayesh Olivera M.D.  Christi Haque M.D.  Zeus Mathur D.O.  Umer Bustamante M.D.       Patient:  Compa Obrien 78 y.o. female MRN: [id number]     Date of Service: 11/12/2024      PULMONARY CONSULTATION    Reason for Consultation: COPD exac  Referring Physician: Ceci Mccarty MD    No chief complaint on file.        Code Status: Full Code        SUBJECTIVE:    HPI:  This is a 78-year-old female with history of advanced emphysema, chronic hypoxic respiratory failure 4-5L O2, pulmonary hypertension, diastolic heart failure, CAD, previous significant tobacco use over 100 pack years that presents from [facility] for AECOPD and orbital floor fracture.  Patient presented to [facility] 11/10 by EMS after patient had fallen on the floor after having tripped on her oxygen.  Patient had significant bruising to her right orbit.  She had CT imaging that showed acute fracture in the floor of the right orbit.  Patient transferred for ophthalmology as well as ENT evaluation as well as worsening acute on chronic hypercapnic respiratory failure.    Patient was on NIV and switched to AVAPS given hypercapnia and transferred to [facility] ICU.  Currently on NIV.  Tells me she had dyspnea for past couple days, but feels better at this time.  Minimal cough.    Past Medical History:   Diagnosis Date    Hypertension     Stroke (HCC)      Past Surgical History:   Procedure Laterality Date    TONSILLECTOMY       Family History   Problem Relation Age of Onset    High Blood Pressure Mother     Other Father     High Blood Pressure Father          Social History:   Social History     Socioeconomic History    Marital status:      Spouse name: Not on file    Number of children: Not on file    Years of education: Not on file    Highest education level: Not on file   Occupational History    Occupation: [employer]   Tobacco Use    Smoking status: Former     Current packs/day: 0.00      present. Rhythm irregular.      Heart sounds: Normal heart sounds.   Pulmonary:      Effort: Pulmonary effort is normal. Tachypnea and prolonged expiration present.      Breath sounds: Decreased breath sounds and wheezing present.   Abdominal:      Palpations: Abdomen is soft.      Tenderness: There is no abdominal tenderness.   Musculoskeletal:         General: No swelling or deformity.      Cervical back: Neck supple.   Lymphadenopathy:      Cervical: No cervical adenopathy.   Skin:     General: Skin is warm.      Findings: No rash.      Comments: +ecchymosis around right orbit   Neurological:      General: No focal deficit present.      Mental Status: She is alert and oriented to person, place, and time.   Psychiatric:         Behavior: Behavior normal.         Pulmonary Function Testing personally reviewed and interpreted.    PERTINENT LAB RESULTS: Labs reviewed.     [place] ABG: pH 7.23, pCO2 84.1, pO2 160, HCO3 39  repeat ABG improved: 7.28/70.9/83/33     DIAGNOSTICS: Pertinent imaging reviewed.    CXR 11/12 -      ASSESSMENT:     Acute on chronic hypercapnic and hypoxic respiratory failure. O2 baseline 4-5 L, home BIPAP, but not compliant.  Acute exacerbation of COPD.  Patient with advanced emphysema.  No previous PFT.  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 Will arrange for NIV device with AVAPS closer to discharge. DME is [employer].  Aerosol bronchodilators with pulmicort/brovana, xopenex for now given tachycardia  Solumedrol 40 mg q12  Bronchopulmonary hygiene  GI/DVT        Thank you for allowing me to participate in the care of Compa TANNER Obrien.   Please feel free to call with questions.       Electronically signed by

## 2024-11-12 NOTE — ACP (ADVANCE CARE PLANNING)
Advance Care Planning   Healthcare Decision Maker:    Primary Decision Maker: [facility] - [phone]    Click here to complete Healthcare Decision Makers including selection of the Healthcare Decision Maker Relationship (ie \"Primary\").

## 2024-11-12 NOTE — PROGRESS NOTES
Patients daughter called to notify of patients arrival to unit. She did not answer, voicemail left to call back.

## 2024-11-12 NOTE — PROGRESS NOTES
Spoke with Dr. Catalan from oral surgery, OK to give patient Eliquis from their standpoint. No oral surgery intervention.

## 2024-11-13 ENCOUNTER — APPOINTMENT (OUTPATIENT)
Dept: GENERAL RADIOLOGY | Age: 79
End: 2024-11-13
Attending: INTERNAL MEDICINE
Payer: COMMERCIAL

## 2024-11-13 LAB
ALBUMIN SERPL-MCNC: 3.4 G/DL (ref 3.5–5.2)
ALP SERPL-CCNC: 84 U/L (ref 35–104)
ALT SERPL-CCNC: 8 U/L (ref 0–32)
ANION GAP SERPL CALCULATED.3IONS-SCNC: 13 MMOL/L (ref 7–16)
AST SERPL-CCNC: 13 U/L (ref 0–31)
BASOPHILS # BLD: 0.01 K/UL (ref 0–0.2)
BASOPHILS NFR BLD: 0 % (ref 0–2)
BILIRUB SERPL-MCNC: 0.3 MG/DL (ref 0–1.2)
BUN SERPL-MCNC: 24 MG/DL (ref 6–23)
CALCIUM SERPL-MCNC: 9 MG/DL (ref 8.6–10.2)
CHLORIDE SERPL-SCNC: 91 MMOL/L (ref 98–107)
CO2 SERPL-SCNC: 33 MMOL/L (ref 22–29)
CREAT SERPL-MCNC: 0.6 MG/DL (ref 0.5–1)
ECHO AO ASC DIAM: 3.3 CM
ECHO AO ASCENDING AORTA INDEX: 2.21 CM/M2
ECHO AV AREA PEAK VELOCITY: 2.1 CM2
ECHO AV AREA VTI: 2.4 CM2
ECHO AV AREA/BSA PEAK VELOCITY: 1.4 CM2/M2
ECHO AV AREA/BSA VTI: 1.6 CM2/M2
ECHO AV CUSP MM: 1.1 CM
ECHO AV MEAN GRADIENT: 6 MMHG
ECHO AV MEAN VELOCITY: 1.2 M/S
ECHO AV PEAK GRADIENT: 14 MMHG
ECHO AV PEAK VELOCITY: 1.9 M/S
ECHO AV VELOCITY RATIO: 0.74
ECHO AV VTI: 36 CM
ECHO BSA: 1.53 M2
ECHO EST RA PRESSURE: 8 MMHG
ECHO LA VOL A-L A2C: 42 ML (ref 22–52)
ECHO LA VOL A-L A4C: 27 ML (ref 22–52)
ECHO LA VOL MOD A2C: 43 ML (ref 22–52)
ECHO LA VOL MOD A4C: 27 ML (ref 22–52)
ECHO LA VOLUME AREA LENGTH: 34 ML
ECHO LA VOLUME INDEX A-L A2C: 28 ML/M2 (ref 16–34)
ECHO LA VOLUME INDEX A-L A4C: 18 ML/M2 (ref 16–34)
ECHO LA VOLUME INDEX AREA LENGTH: 23 ML/M2 (ref 16–34)
ECHO LA VOLUME INDEX MOD A2C: 29 ML/M2 (ref 16–34)
ECHO LA VOLUME INDEX MOD A4C: 18 ML/M2 (ref 16–34)
ECHO LV E' LATERAL VELOCITY: 9 CM/S
ECHO LV E' SEPTAL VELOCITY: 7 CM/S
ECHO LV EDV A2C: 82 ML
ECHO LV EDV A4C: 82 ML
ECHO LV EDV BP: 84 ML (ref 56–104)
ECHO LV EDV INDEX A4C: 55 ML/M2
ECHO LV EDV INDEX BP: 56 ML/M2
ECHO LV EDV NDEX A2C: 55 ML/M2
ECHO LV EF PHYSICIAN: 58 %
ECHO LV EJECTION FRACTION A2C: 58 %
ECHO LV EJECTION FRACTION A4C: 63 %
ECHO LV EJECTION FRACTION BIPLANE: 60 % (ref 55–100)
ECHO LV ESV A2C: 34 ML
ECHO LV ESV A4C: 31 ML
ECHO LV ESV BP: 33 ML (ref 19–49)
ECHO LV ESV INDEX A2C: 23 ML/M2
ECHO LV ESV INDEX A4C: 21 ML/M2
ECHO LV ESV INDEX BP: 22 ML/M2
ECHO LV FRACTIONAL SHORTENING: 26 % (ref 28–44)
ECHO LV INTERNAL DIMENSION DIASTOLE INDEX: 2.89 CM/M2
ECHO LV INTERNAL DIMENSION DIASTOLIC: 4.3 CM (ref 3.9–5.3)
ECHO LV INTERNAL DIMENSION SYSTOLIC INDEX: 2.15 CM/M2
ECHO LV INTERNAL DIMENSION SYSTOLIC: 3.2 CM
ECHO LV ISOVOLUMETRIC RELAXATION TIME (IVRT): 73.8 MS
ECHO LVOT AREA: 2.8 CM2
ECHO LVOT AV VTI INDEX: 0.86
ECHO LVOT DIAM: 1.9 CM
ECHO LVOT MEAN GRADIENT: 3 MMHG
ECHO LVOT PEAK GRADIENT: 8 MMHG
ECHO LVOT PEAK VELOCITY: 1.4 M/S
ECHO LVOT STROKE VOLUME INDEX: 59.1 ML/M2
ECHO LVOT SV: 88.1 ML
ECHO LVOT VTI: 31.1 CM
ECHO MV "A" WAVE DURATION: 129.2 MSEC
ECHO MV A VELOCITY: 1.19 M/S
ECHO MV AREA PHT: 3.1 CM2
ECHO MV AREA VTI: 2.8 CM2
ECHO MV E DECELERATION TIME (DT): 211 MS
ECHO MV E VELOCITY: 0.94 M/S
ECHO MV E/A RATIO: 0.79
ECHO MV E/E' LATERAL: 10.44
ECHO MV E/E' RATIO (AVERAGED): 11.94
ECHO MV E/E' SEPTAL: 13.43
ECHO MV LVOT VTI INDEX: 1.01
ECHO MV MAX VELOCITY: 1.3 M/S
ECHO MV MEAN GRADIENT: 3 MMHG
ECHO MV MEAN VELOCITY: 0.8 M/S
ECHO MV PEAK GRADIENT: 7 MMHG
ECHO MV PRESSURE HALF TIME (PHT): 70.5 MS
ECHO MV VTI: 31.5 CM
ECHO PV MAX VELOCITY: 1 M/S
ECHO PV MEAN GRADIENT: 3 MMHG
ECHO PV MEAN VELOCITY: 0.8 M/S
ECHO PV PEAK GRADIENT: 4 MMHG
ECHO PV VTI: 23.7 CM
ECHO PVEIN A DURATION: 110.7 MS
ECHO PVEIN A VELOCITY: 0.4 M/S
ECHO PVEIN PEAK D VELOCITY: 0.5 M/S
ECHO PVEIN PEAK S VELOCITY: 0.7 M/S
ECHO PVEIN S/D RATIO: 1.4
ECHO RIGHT VENTRICULAR SYSTOLIC PRESSURE (RVSP): 54 MMHG
ECHO RV BASAL DIMENSION: 3.4 CM
ECHO RV LONGITUDINAL DIMENSION: 5.3 CM
ECHO RV MID DIMENSION: 2.7 CM
ECHO RV TAPSE: 2 CM (ref 1.7–?)
ECHO TV REGURGITANT MAX VELOCITY: 3.4 M/S
ECHO TV REGURGITANT PEAK GRADIENT: 46 MMHG
EOSINOPHIL # BLD: 0 K/UL (ref 0.05–0.5)
EOSINOPHILS RELATIVE PERCENT: 0 % (ref 0–6)
ERYTHROCYTE [DISTWIDTH] IN BLOOD BY AUTOMATED COUNT: 14.4 % (ref 11.5–15)
GFR, ESTIMATED: >90 ML/MIN/1.73M2
GLUCOSE SERPL-MCNC: 124 MG/DL (ref 74–99)
HCT VFR BLD AUTO: 32.4 % (ref 34–48)
HGB BLD-MCNC: 9.7 G/DL (ref 11.5–15.5)
IMM GRANULOCYTES # BLD AUTO: 0.05 K/UL (ref 0–0.58)
IMM GRANULOCYTES NFR BLD: 1 % (ref 0–5)
INR PPP: 1
LYMPHOCYTES NFR BLD: 0.51 K/UL (ref 1.5–4)
LYMPHOCYTES RELATIVE PERCENT: 9 % (ref 20–42)
MAGNESIUM SERPL-MCNC: 2.2 MG/DL (ref 1.6–2.6)
MCH RBC QN AUTO: 30.1 PG (ref 26–35)
MCHC RBC AUTO-ENTMCNC: 29.9 G/DL (ref 32–34.5)
MCV RBC AUTO: 100.6 FL (ref 80–99.9)
MICROORGANISM SPEC CULT: NORMAL
MONOCYTES NFR BLD: 0.13 K/UL (ref 0.1–0.95)
MONOCYTES NFR BLD: 2 % (ref 2–12)
NEUTROPHILS NFR BLD: 88 % (ref 43–80)
NEUTS SEG NFR BLD: 5.24 K/UL (ref 1.8–7.3)
PHOSPHATE SERPL-MCNC: 3.4 MG/DL (ref 2.5–4.5)
PLATELET # BLD AUTO: 344 K/UL (ref 130–450)
PMV BLD AUTO: 10.2 FL (ref 7–12)
POTASSIUM SERPL-SCNC: 4.5 MMOL/L (ref 3.5–5)
PROT SERPL-MCNC: 6.7 G/DL (ref 6.4–8.3)
PROTHROMBIN TIME: 10.7 SEC (ref 9.3–12.4)
RBC # BLD AUTO: 3.22 M/UL (ref 3.5–5.5)
RBC # BLD: ABNORMAL 10*6/UL
SODIUM SERPL-SCNC: 137 MMOL/L (ref 132–146)
SPECIMEN DESCRIPTION: NORMAL
WBC OTHER # BLD: 5.9 K/UL (ref 4.5–11.5)

## 2024-11-13 PROCEDURE — 99232 SBSQ HOSP IP/OBS MODERATE 35: CPT | Performed by: INTERNAL MEDICINE

## 2024-11-13 PROCEDURE — 80053 COMPREHEN METABOLIC PANEL: CPT

## 2024-11-13 PROCEDURE — 94640 AIRWAY INHALATION TREATMENT: CPT

## 2024-11-13 PROCEDURE — 97530 THERAPEUTIC ACTIVITIES: CPT

## 2024-11-13 PROCEDURE — 2580000003 HC RX 258

## 2024-11-13 PROCEDURE — 97162 PT EVAL MOD COMPLEX 30 MIN: CPT

## 2024-11-13 PROCEDURE — 94660 CPAP INITIATION&MGMT: CPT

## 2024-11-13 PROCEDURE — 2060000000 HC ICU INTERMEDIATE R&B

## 2024-11-13 PROCEDURE — 85025 COMPLETE CBC W/AUTO DIFF WBC: CPT

## 2024-11-13 PROCEDURE — 71045 X-RAY EXAM CHEST 1 VIEW: CPT

## 2024-11-13 PROCEDURE — 6360000002 HC RX W HCPCS: Performed by: INTERNAL MEDICINE

## 2024-11-13 PROCEDURE — 6370000000 HC RX 637 (ALT 250 FOR IP)

## 2024-11-13 PROCEDURE — 6360000002 HC RX W HCPCS

## 2024-11-13 PROCEDURE — 2580000003 HC RX 258: Performed by: INTERNAL MEDICINE

## 2024-11-13 PROCEDURE — 83735 ASSAY OF MAGNESIUM: CPT

## 2024-11-13 PROCEDURE — 84100 ASSAY OF PHOSPHORUS: CPT

## 2024-11-13 PROCEDURE — 97166 OT EVAL MOD COMPLEX 45 MIN: CPT

## 2024-11-13 PROCEDURE — 99291 CRITICAL CARE FIRST HOUR: CPT | Performed by: INTERNAL MEDICINE

## 2024-11-13 PROCEDURE — 6370000000 HC RX 637 (ALT 250 FOR IP): Performed by: INTERNAL MEDICINE

## 2024-11-13 PROCEDURE — 6370000000 HC RX 637 (ALT 250 FOR IP): Performed by: NURSE PRACTITIONER

## 2024-11-13 PROCEDURE — 85610 PROTHROMBIN TIME: CPT

## 2024-11-13 PROCEDURE — 97535 SELF CARE MNGMENT TRAINING: CPT

## 2024-11-13 PROCEDURE — 93306 TTE W/DOPPLER COMPLETE: CPT | Performed by: INTERNAL MEDICINE

## 2024-11-13 PROCEDURE — 2700000000 HC OXYGEN THERAPY PER DAY

## 2024-11-13 RX ORDER — PANTOPRAZOLE SODIUM 40 MG/1
40 TABLET, DELAYED RELEASE ORAL
Status: DISCONTINUED | OUTPATIENT
Start: 2024-11-13 | End: 2024-11-15 | Stop reason: HOSPADM

## 2024-11-13 RX ADMIN — SODIUM CHLORIDE, PRESERVATIVE FREE 10 ML: 5 INJECTION INTRAVENOUS at 08:49

## 2024-11-13 RX ADMIN — GUAIFENESIN 400 MG: 400 TABLET ORAL at 13:02

## 2024-11-13 RX ADMIN — ARFORMOTEROL TARTRATE 15 MCG: 15 SOLUTION RESPIRATORY (INHALATION) at 08:16

## 2024-11-13 RX ADMIN — ARFORMOTEROL TARTRATE 15 MCG: 15 SOLUTION RESPIRATORY (INHALATION) at 20:28

## 2024-11-13 RX ADMIN — GUAIFENESIN 400 MG: 400 TABLET ORAL at 08:49

## 2024-11-13 RX ADMIN — BUDESONIDE 500 MCG: 0.5 SUSPENSION RESPIRATORY (INHALATION) at 08:16

## 2024-11-13 RX ADMIN — APIXABAN 5 MG: 5 TABLET, FILM COATED ORAL at 13:02

## 2024-11-13 RX ADMIN — APIXABAN 5 MG: 5 TABLET, FILM COATED ORAL at 21:08

## 2024-11-13 RX ADMIN — CLOPIDOGREL BISULFATE 75 MG: 75 TABLET ORAL at 08:49

## 2024-11-13 RX ADMIN — SODIUM CHLORIDE, PRESERVATIVE FREE 10 ML: 5 INJECTION INTRAVENOUS at 08:51

## 2024-11-13 RX ADMIN — BUDESONIDE 500 MCG: 0.5 SUSPENSION RESPIRATORY (INHALATION) at 20:28

## 2024-11-13 RX ADMIN — SODIUM CHLORIDE, PRESERVATIVE FREE 10 ML: 5 INJECTION INTRAVENOUS at 21:08

## 2024-11-13 RX ADMIN — ASPIRIN 81 MG: 81 TABLET, COATED ORAL at 08:49

## 2024-11-13 RX ADMIN — LEVALBUTEROL 1.25 MG: 1.25 SOLUTION RESPIRATORY (INHALATION) at 04:19

## 2024-11-13 RX ADMIN — LEVALBUTEROL 1.25 MG: 1.25 SOLUTION RESPIRATORY (INHALATION) at 08:16

## 2024-11-13 RX ADMIN — WATER 40 MG: 1 INJECTION INTRAMUSCULAR; INTRAVENOUS; SUBCUTANEOUS at 02:00

## 2024-11-13 RX ADMIN — LEVALBUTEROL 1.25 MG: 1.25 SOLUTION RESPIRATORY (INHALATION) at 15:15

## 2024-11-13 RX ADMIN — PANTOPRAZOLE SODIUM 40 MG: 40 TABLET, DELAYED RELEASE ORAL at 08:49

## 2024-11-13 RX ADMIN — GUAIFENESIN 400 MG: 400 TABLET ORAL at 21:08

## 2024-11-13 RX ADMIN — WATER 40 MG: 1 INJECTION INTRAMUSCULAR; INTRAVENOUS; SUBCUTANEOUS at 13:02

## 2024-11-13 RX ADMIN — METOPROLOL SUCCINATE 50 MG: 50 TABLET, EXTENDED RELEASE ORAL at 08:49

## 2024-11-13 NOTE — PROGRESS NOTES
Vascular Surgery Progress Note    CC: AAA    HISTORY:  The patient is awake, alert, and oriented.  Feeling much better today.  Denies abdominal or back pain.  HR better controlled.      IMPRESSION:  7 cm AAA    PLAN:  Again discussed with pt regarding her AAA.  She states she will follow up with vascular surgery in [place] for repair.  I reiterated that if she were to develop any sudden sharp abdominal or back pain to present to the emergency department immediately.  She stated understanding.  Ok for discharge from vascular surgery pov.  Please call as needed.    Patient Active Problem List   Diagnosis Code    Hypertension I10    Chronic obstructive pulmonary disease (HCC) J44.9    Mixed hyperlipidemia E78.2    Chronic systolic congestive heart failure (HCC) I50.22    H/O: CVA (cerebrovascular accident) Z86.73    History of coronary angioplasty with insertion of stent Z95.5    H/O peptic ulcer Z87.11    H/O: GI bleed Z87.19    Infrarenal abdominal aortic aneurysm (AAA) without rupture ([facility]) I71.43    Orbital floor (blow-out) closed fracture S02.30XA    Fracture of orbital floor, unspecified side, initial encounter for closed fracture S02.30XA       Current Medications:    sodium chloride      dilTIAZem Stopped (11/12/24 2030)    dextrose        sodium chloride flush, sodium chloride, ondansetron **OR** ondansetron, polyethylene glycol, acetaminophen **OR** acetaminophen, sodium chloride flush, glucose, dextrose bolus **OR** dextrose bolus, glucagon (rDNA), dextrose    pantoprazole  40 mg Oral QAM AC    sodium chloride flush  5-40 mL IntraVENous 2 times per day    aspirin  81 mg Oral Daily    clopidogrel  75 mg Oral Daily    metoprolol succinate  50 mg Oral Daily    arformoterol tartrate  15 mcg Nebulization BID RT    levalbuterol  1.25 mg Nebulization Q8H    guaiFENesin  400 mg Oral TID    methylPREDNISolone  40 mg IntraVENous Q12H    sodium chloride flush  5-40 mL IntraVENous 2 times per day    budesonide  0.5 mg

## 2024-11-13 NOTE — PROGRESS NOTES
[facility]  Department of Internal Medicine   Internal Medicine Residency   MICU Progress Note    Patient:  Compa Obrien 78 y.o. female  MRN: [id number]     Date of Service: 11/13/2024    Allergy: Patient has no known allergies.    Subjective     Patient seen and examined at bedside this morning. She is seen awake, alert and not in distress. Patient denies any chest pain, SOB, palpitations or headaches. She reports improvement of symptoms overall.     24h change: none  ROS: Denies Fever/chills/CP/SOB/N/V/D/C/Dysuria/Blood in stool or urine  Objective     VS: BP (!) 161/90   Pulse 77   Temp 98 °F (36.7 °C) (Temporal)   Resp 26   Ht 1.473 m (4' 10\")   Wt 57 kg (125 lb 10.6 oz)   LMP  (LMP Unknown)   SpO2 92%   BMI 26.26 kg/m²           I & O - 24hr:   Intake/Output Summary (Last 24 hours) at 11/13/2024 1533  Last data filed at 11/13/2024 1408  Gross per 24 hour   Intake 276.76 ml   Output --   Net 276.76 ml       Physical Exam:  General Appearance: alert, appears stated age, and cooperative; ecchymosis on right orbital area  Neck: no adenopathy and no JVD  Lung:  decreased air entry bilaterally   Heart: regular rate and rhythm, S1, S2 normal, no murmur, click, rub or gallop  Abdomen: soft, non-tender; bowel sounds normal; no masses,  no organomegaly  Extremities:  extremities normal, atraumatic, no cyanosis or edema  Musculoskeletal: No joint swelling, no muscle tenderness. ROM normal in all joints of extremities.   Neurologic: Mental status: Alert, oriented, thought content appropriate    Lines     site day    Art line   None    TLC None    PICC None    Hemoaccess None    Oxygen:    nasal canula at 5L/min      ABG:     Lab Results   Component Value Date/Time    PH 7.456 11/12/2024 02:47 PM    PCO2 53.8 11/12/2024 02:47 PM    PO2 78.4 11/12/2024 02:47 PM    HCO3 37.1 11/12/2024 02:47 PM    BE 11.6 11/12/2024 02:47 PM    THB 10.2 11/12/2024 02:47 PM    O2SAT 95.4 11/12/2024 02:47 PM

## 2024-11-13 NOTE — CONSULTS
Reviewed imaging and spoke with on call resident 11/12/24.     Small orbital fracture without ocular symptoms.  She denies any vision change or diplopia.  Defer any surgical management to OMFS.     Recommend she follow up outpatient in 1 month with ophthalmology. She can follow up with me or her established provider. Call for apt if needed.

## 2024-11-13 NOTE — PROGRESS NOTES
[facility]        CARDIOLOGY                 INPATIENT PROGRESS NOTE          PATIENT SEEN IN FOLLOW UP FOR: A Fib    Hospital Day: 3     Compa Obrien is a 78 year old patient known to Dr. Durand in [facility]      SUBJECTIVE: Denies any CP or SOB.  No dizzy. No distress. No family at bed  side    ROS: Review of rest of 10 systems negative except as mentioned above    OBJECTIVE: No acute distress.  See Assessment     Diagnostics:       Telemetry: Reviewed         Intake/Output Summary (Last 24 hours) at 11/13/2024 0932  Last data filed at 11/12/2024 1833  Gross per 24 hour   Intake 115.99 ml   Output --   Net 115.99 ml       Labs:   CBC:   Recent Labs     11/12/24 0417 11/12/24 0551 11/13/24 0353   WBC 4.8  --  5.9   HGB 7.0* 8.3* 9.7*   HCT 24.7* 27.9* 32.4*     --  344     BMP:   Recent Labs     11/12/24 0534 11/13/24 0353    137   K 4.4 4.5   CO2 38* 33*   BUN 24* 24*   CREATININE 0.8 0.6   LABGLOM 79 >90   CALCIUM 8.8 9.0     Mag:   Recent Labs     11/12/24 0534 11/13/24 0353   MG 2.1 2.2     Phos:   Recent Labs     11/12/24 0534 11/13/24 0353   PHOS 3.6 3.4     TSH:   Recent Labs     11/12/24 0534   TSH 0.37     HgA1c:     BNP: No results for input(s): \"BNP\" in the last 72 hours.  PT/INR:   Recent Labs     11/13/24 0353   PROTIME 10.7   INR 1.0     APTT:No results for input(s): \"APTT\" in the last 72 hours.  CARDIAC ENZYMES:  Recent Labs     11/12/24 0006 11/12/24 0417   TROPHS 13* 11*     FASTING LIPID PANEL:  Lab Results   Component Value Date/Time    CHOL 209 03/26/2024 04:35 PM    CHOL 182 10/04/2022 12:00 PM    HDL 66 03/26/2024 04:35 PM    TRIG 142 03/26/2024 04:35 PM     LIVER PROFILE:  Recent Labs     11/12/24 0006 11/13/24 0353   AST 12 13   ALT 7 8       Current Inpatient Medications:   pantoprazole  40 mg Oral QAM AC    sodium chloride flush  5-40 mL IntraVENous 2 times per day    aspirin  81 mg Oral Daily    clopidogrel  75 mg Oral  by vascular surgery at [facility].      Home O2     HTN - Monitor BP     Chronic Anemia - Monitor H/H             Pending her Echo, Cardiology will follow as needed    F/u with her Cardiologist, Dr Durand, after discharge.     above d/w her - no family at bed side      Electronically signed by Estrellita Bond MD on 11/13/2024 at 9:32 AM  [facility]

## 2024-11-13 NOTE — PROGRESS NOTES
Date: 11/13/2024    Time: 2:13 AM    Patient Placed On BIPAP/CPAP/ Non-Invasive Ventilation?  No    If no must comment.  Facial area red/color change? No           If YES are Blister/Lesion present?No   If yes must notify nursing staff  BIPAP/CPAP skin barrier?  Yes    Skin barrier type:mepilexlite       Comments:        Juaquin Armstrong RCP

## 2024-11-13 NOTE — PLAN OF CARE
Problem: Chronic Conditions and Co-morbidities  Goal: Patient's chronic conditions and co-morbidity symptoms are monitored and maintained or improved  11/12/2024 1925 by Maru Caldwell RN  Outcome: Progressing  11/12/2024 0847 by Carmen Ramirez RN  Outcome: Progressing  Flowsheets (Taken 11/12/2024 0800)  Care Plan - Patient's Chronic Conditions and Co-Morbidity Symptoms are Monitored and Maintained or Improved:   Monitor and assess patient's chronic conditions and comorbid symptoms for stability, deterioration, or improvement   Collaborate with multidisciplinary team to address chronic and comorbid conditions and prevent exacerbation or deterioration   Update acute care plan with appropriate goals if chronic or comorbid symptoms are exacerbated and prevent overall improvement and discharge     Problem: Discharge Planning  Goal: Discharge to home or other facility with appropriate resources  11/12/2024 1925 by Maru Caldwell RN  Outcome: Progressing  11/12/2024 0847 by Carmen Ramirez RN  Outcome: Progressing  Flowsheets (Taken 11/12/2024 0800)  Discharge to home or other facility with appropriate resources:   Identify barriers to discharge with patient and caregiver   Arrange for needed discharge resources and transportation as appropriate   Identify discharge learning needs (meds, wound care, etc)   Arrange for interpreters to assist at discharge as needed   Refer to discharge planning if patient needs post-hospital services based on physician order or complex needs related to functional status, cognitive ability or social support system     Problem: Skin/Tissue Integrity  Goal: Absence of new skin breakdown  Description: 1.  Monitor for areas of redness and/or skin breakdown  2.  Assess vascular access sites hourly  3.  Every 4-6 hours minimum:  Change oxygen saturation probe site  4.  Every 4-6 hours:  If on nasal continuous positive airway pressure, respiratory therapy assess nares and determine need for

## 2024-11-13 NOTE — PROGRESS NOTES
OCCUPATIONAL THERAPY INITIAL EVALUATION    [facility]  [address], [place]       Date:2024                                                               Patient Name: Compa Obrien - goes by \"Val\"  MRN: [id number]  : 1945  Room: [address] [place], [place] [address]       Evaluating OT: Des Elizabeth OTR/L; [id number]     Referring Provider: Jayde Hayes APRN - CNP    Specific Provider Orders/Date: OT eval and treat (24 2300)     Diagnosis: Orbital floor (blow-out) closed fracture [S02.30XA]  Fracture of orbital floor, unspecified side, initial encounter for closed fracture [S02.30XA]     Reason for admission: Pt transferred from [facility] ICU 2/2 COPD exacerbation & R orbital floor fx 2/2 fall (pt chart, pt reported falling 2x on floor within 24 hrs).    Surgery/Procedures: None this admission.     Pertinent Medical History:    Past Medical History:   Diagnosis Date    Hypertension     Stroke (HCC)         *Precautions:  Fall Risk, AAA, monitor HR/SpO2, R orbital fx    Assessment of current deficits   [x] Functional mobility  [x]ADLs  [x] Strength               []Cognition   [x] Functional transfers   [x] IADLs         [x] Safety Awareness   [x]Endurance   [] Fine Coordination        [] ROM     [] Vision/perception   []Sensation    []Gross Motor Coordination [x] Balance   [] Delirium                  []Motor Control     [] Communication    OT PLAN OF CARE   OT POC based on physician orders, patient diagnosis and results of clinical assessment.       Frequency/Duration: 1-3 days/wk for 1-2 weeks PRN    Specific OT Treatment Interventions to include:   * Instruction/training on adapted ADL techniques and AE recommendations to increase functional independence within precautions       * Training on energy conservation strategies, correct breathing pattern and techniques to improve independence/tolerance for self-care routine  *

## 2024-11-13 NOTE — PROGRESS NOTES
Physical Therapy  Physical Therapy Initial Assessment     Name: Compa Obrien  : 1945  MRN: [id number]      Date of Service: 2024    Evaluating PT:  Bj Holley, PT, DPT  [id number]     Room #:  [facility]7/4417-A  Diagnosis:  Orbital floor (blow-out) closed fracture [S02.30XA]  Fracture of orbital floor, unspecified side, initial encounter for closed fracture [S02.30XA]  PMHx/PSHx:   has a past medical history of Hypertension and Stroke (HCC).   Procedure/Surgery:  None  Precautions:  Falls, O2 vs BiPAP, maintain SpO2 between 88-92%, hx of CVA, 7cm AAA (refusing intervention)   Equipment Needs:  TBD    Goes by \"Granny\"     SUBJECTIVE:    Pt lives alone in a 1 story home with a first floor setup and no stairs to enter.  Pt ambulated with FWW PTA. Pt's dtr lives next door and is able to provide 24/7 PRN. Pt is on 5L O2 vs BiPAP at baseline.    OBJECTIVE:   Initial Evaluation  Date: 2024 Treatment Short Term/ Long Term   Goals   AM-PAC 6 Clicks      Was pt agreeable to Eval/treatment? Yes     Does pt have pain? None reported     Bed Mobility  Rolling: SBA  Supine to sit: SBA  Sit to supine: NT  Scooting: SBA  Rolling: Independent  Supine to sit: Independent  Sit to supine: Independent  Scooting: Independent   Transfers Sit to stand: Mary  Stand to sit: Mary  Stand pivot: Mary with FWW  Sit to stand: Independent  Stand to sit: Independent  Stand pivot: Mod I with FWW   Ambulation    Few steps to chair with FWW Mary  >25 feet with FWW Mod I   Stair negotiation: ascended and descended NT  >2 steps with 1 rail Mod I   ROM BUE:  Per OT eval  BLE:  WFL     Strength BUE:  Per OT eval  BLE:  Grossly 5/5     Balance Sitting EOB:  SBA  Dynamic Standing:  Mary with FWW  Sitting EOB:  Independent  Dynamic Standing:  Mod I with FWW     Pt is A & O x 4  RASS:  +1  CAM-ICU:  NT  Sensation:  Pt denies numbness and tingling to extremities  Edema:  Unremarkable    Vitals:  Blood Pressure at rest 151/77 mmHg

## 2024-11-13 NOTE — PLAN OF CARE
Problem: Chronic Conditions and Co-morbidities  Goal: Patient's chronic conditions and co-morbidity symptoms are monitored and maintained or improved  11/13/2024 0859 by Carmen Ramirez RN  Outcome: Progressing  Flowsheets (Taken 11/13/2024 0800)  Care Plan - Patient's Chronic Conditions and Co-Morbidity Symptoms are Monitored and Maintained or Improved:   Monitor and assess patient's chronic conditions and comorbid symptoms for stability, deterioration, or improvement   Update acute care plan with appropriate goals if chronic or comorbid symptoms are exacerbated and prevent overall improvement and discharge   Collaborate with multidisciplinary team to address chronic and comorbid conditions and prevent exacerbation or deterioration  11/12/2024 1925 by Maru Caldwell RN  Outcome: Progressing     Problem: Discharge Planning  Goal: Discharge to home or other facility with appropriate resources  11/13/2024 0859 by Carmen Ramirez RN  Outcome: Progressing  Flowsheets (Taken 11/13/2024 0800)  Discharge to home or other facility with appropriate resources:   Identify barriers to discharge with patient and caregiver   Arrange for needed discharge resources and transportation as appropriate   Arrange for interpreters to assist at discharge as needed   Identify discharge learning needs (meds, wound care, etc)   Refer to discharge planning if patient needs post-hospital services based on physician order or complex needs related to functional status, cognitive ability or social support system  11/12/2024 1925 by Maru Caldwell, RN  Outcome: Progressing     Problem: Skin/Tissue Integrity  Goal: Absence of new skin breakdown  Description: 1.  Monitor for areas of redness and/or skin breakdown  2.  Assess vascular access sites hourly  3.  Every 4-6 hours minimum:  Change oxygen saturation probe site  4.  Every 4-6 hours:  If on nasal continuous positive airway pressure, respiratory therapy assess nares and determine need for

## 2024-11-14 LAB
ALBUMIN SERPL-MCNC: 3.5 G/DL (ref 3.5–5.2)
ALP SERPL-CCNC: 89 U/L (ref 35–104)
ALT SERPL-CCNC: 10 U/L (ref 0–32)
ANION GAP SERPL CALCULATED.3IONS-SCNC: 8 MMOL/L (ref 7–16)
AST SERPL-CCNC: 14 U/L (ref 0–31)
BASOPHILS # BLD: 0.01 K/UL (ref 0–0.2)
BASOPHILS NFR BLD: 0 % (ref 0–2)
BILIRUB SERPL-MCNC: 0.3 MG/DL (ref 0–1.2)
BUN SERPL-MCNC: 22 MG/DL (ref 6–23)
CALCIUM SERPL-MCNC: 9.5 MG/DL (ref 8.6–10.2)
CHLORIDE SERPL-SCNC: 94 MMOL/L (ref 98–107)
CO2 SERPL-SCNC: 37 MMOL/L (ref 22–29)
CREAT SERPL-MCNC: 0.7 MG/DL (ref 0.5–1)
EOSINOPHIL # BLD: 0 K/UL (ref 0.05–0.5)
EOSINOPHILS RELATIVE PERCENT: 0 % (ref 0–6)
ERYTHROCYTE [DISTWIDTH] IN BLOOD BY AUTOMATED COUNT: 14.3 % (ref 11.5–15)
GFR, ESTIMATED: 90 ML/MIN/1.73M2
GLUCOSE BLD-MCNC: 150 MG/DL (ref 74–99)
GLUCOSE BLD-MCNC: 182 MG/DL (ref 74–99)
GLUCOSE SERPL-MCNC: 147 MG/DL (ref 74–99)
HCT VFR BLD AUTO: 32.8 % (ref 34–48)
HGB BLD-MCNC: 9.9 G/DL (ref 11.5–15.5)
IMM GRANULOCYTES # BLD AUTO: 0.06 K/UL (ref 0–0.58)
IMM GRANULOCYTES NFR BLD: 1 % (ref 0–5)
LYMPHOCYTES NFR BLD: 0.25 K/UL (ref 1.5–4)
LYMPHOCYTES RELATIVE PERCENT: 5 % (ref 20–42)
MAGNESIUM SERPL-MCNC: 2.2 MG/DL (ref 1.6–2.6)
MCH RBC QN AUTO: 30.4 PG (ref 26–35)
MCHC RBC AUTO-ENTMCNC: 30.2 G/DL (ref 32–34.5)
MCV RBC AUTO: 100.6 FL (ref 80–99.9)
MONOCYTES NFR BLD: 0.12 K/UL (ref 0.1–0.95)
MONOCYTES NFR BLD: 2 % (ref 2–12)
NEUTROPHILS NFR BLD: 91 % (ref 43–80)
NEUTS SEG NFR BLD: 4.66 K/UL (ref 1.8–7.3)
PHOSPHATE SERPL-MCNC: 3.1 MG/DL (ref 2.5–4.5)
PLATELET # BLD AUTO: 373 K/UL (ref 130–450)
PMV BLD AUTO: 10.2 FL (ref 7–12)
POTASSIUM SERPL-SCNC: 4.6 MMOL/L (ref 3.5–5)
PROT SERPL-MCNC: 6.6 G/DL (ref 6.4–8.3)
RBC # BLD AUTO: 3.26 M/UL (ref 3.5–5.5)
RBC # BLD: ABNORMAL 10*6/UL
SODIUM SERPL-SCNC: 139 MMOL/L (ref 132–146)
WBC OTHER # BLD: 5.1 K/UL (ref 4.5–11.5)

## 2024-11-14 PROCEDURE — 2700000000 HC OXYGEN THERAPY PER DAY

## 2024-11-14 PROCEDURE — 80053 COMPREHEN METABOLIC PANEL: CPT

## 2024-11-14 PROCEDURE — 94640 AIRWAY INHALATION TREATMENT: CPT

## 2024-11-14 PROCEDURE — 97530 THERAPEUTIC ACTIVITIES: CPT

## 2024-11-14 PROCEDURE — 6370000000 HC RX 637 (ALT 250 FOR IP)

## 2024-11-14 PROCEDURE — 6360000002 HC RX W HCPCS

## 2024-11-14 PROCEDURE — 94660 CPAP INITIATION&MGMT: CPT

## 2024-11-14 PROCEDURE — 36415 COLL VENOUS BLD VENIPUNCTURE: CPT

## 2024-11-14 PROCEDURE — 6370000000 HC RX 637 (ALT 250 FOR IP): Performed by: INTERNAL MEDICINE

## 2024-11-14 PROCEDURE — 6370000000 HC RX 637 (ALT 250 FOR IP): Performed by: NURSE PRACTITIONER

## 2024-11-14 PROCEDURE — 6360000002 HC RX W HCPCS: Performed by: INTERNAL MEDICINE

## 2024-11-14 PROCEDURE — 2580000003 HC RX 258

## 2024-11-14 PROCEDURE — 83735 ASSAY OF MAGNESIUM: CPT

## 2024-11-14 PROCEDURE — 84100 ASSAY OF PHOSPHORUS: CPT

## 2024-11-14 PROCEDURE — 85025 COMPLETE CBC W/AUTO DIFF WBC: CPT

## 2024-11-14 PROCEDURE — 82962 GLUCOSE BLOOD TEST: CPT

## 2024-11-14 PROCEDURE — 2060000000 HC ICU INTERMEDIATE R&B

## 2024-11-14 PROCEDURE — 97535 SELF CARE MNGMENT TRAINING: CPT

## 2024-11-14 RX ORDER — AMLODIPINE BESYLATE 5 MG/1
5 TABLET ORAL DAILY
Status: DISCONTINUED | OUTPATIENT
Start: 2024-11-14 | End: 2024-11-15 | Stop reason: HOSPADM

## 2024-11-14 RX ORDER — GUAIFENESIN 400 MG/1
400 TABLET ORAL 3 TIMES DAILY
Qty: 56 TABLET | Refills: 0 | Status: SHIPPED | OUTPATIENT
Start: 2024-11-14

## 2024-11-14 RX ORDER — IPRATROPIUM BROMIDE AND ALBUTEROL SULFATE 2.5; .5 MG/3ML; MG/3ML
1 SOLUTION RESPIRATORY (INHALATION)
Status: DISCONTINUED | OUTPATIENT
Start: 2024-11-14 | End: 2024-11-15 | Stop reason: HOSPADM

## 2024-11-14 RX ORDER — ALBUTEROL SULFATE 0.83 MG/ML
2.5 SOLUTION RESPIRATORY (INHALATION) 4 TIMES DAILY PRN
Qty: 120 EACH | Refills: 3 | Status: SHIPPED | OUTPATIENT
Start: 2024-11-14

## 2024-11-14 RX ORDER — FUROSEMIDE 40 MG/1
20 TABLET ORAL DAILY
Status: DISCONTINUED | OUTPATIENT
Start: 2024-11-14 | End: 2024-11-15 | Stop reason: HOSPADM

## 2024-11-14 RX ORDER — PREDNISONE 10 MG/1
TABLET ORAL
Qty: 30 TABLET | Refills: 0 | Status: SHIPPED | OUTPATIENT
Start: 2024-11-14

## 2024-11-14 RX ORDER — ARFORMOTEROL TARTRATE 15 UG/2ML
15 SOLUTION RESPIRATORY (INHALATION)
Qty: 120 ML | Refills: 3 | Status: SHIPPED | OUTPATIENT
Start: 2024-11-14

## 2024-11-14 RX ADMIN — LEVALBUTEROL 1.25 MG: 1.25 SOLUTION RESPIRATORY (INHALATION) at 08:01

## 2024-11-14 RX ADMIN — SODIUM CHLORIDE, PRESERVATIVE FREE 10 ML: 5 INJECTION INTRAVENOUS at 09:48

## 2024-11-14 RX ADMIN — IPRATROPIUM BROMIDE AND ALBUTEROL SULFATE 1 DOSE: 2.5; .5 SOLUTION RESPIRATORY (INHALATION) at 15:39

## 2024-11-14 RX ADMIN — IPRATROPIUM BROMIDE AND ALBUTEROL SULFATE 1 DOSE: 2.5; .5 SOLUTION RESPIRATORY (INHALATION) at 20:36

## 2024-11-14 RX ADMIN — GUAIFENESIN 400 MG: 400 TABLET ORAL at 13:31

## 2024-11-14 RX ADMIN — WATER 40 MG: 1 INJECTION INTRAMUSCULAR; INTRAVENOUS; SUBCUTANEOUS at 13:33

## 2024-11-14 RX ADMIN — GUAIFENESIN 400 MG: 400 TABLET ORAL at 21:02

## 2024-11-14 RX ADMIN — CLOPIDOGREL BISULFATE 75 MG: 75 TABLET ORAL at 09:49

## 2024-11-14 RX ADMIN — SODIUM CHLORIDE, PRESERVATIVE FREE 10 ML: 5 INJECTION INTRAVENOUS at 21:03

## 2024-11-14 RX ADMIN — FUROSEMIDE 20 MG: 40 TABLET ORAL at 13:31

## 2024-11-14 RX ADMIN — WATER 40 MG: 1 INJECTION INTRAMUSCULAR; INTRAVENOUS; SUBCUTANEOUS at 01:06

## 2024-11-14 RX ADMIN — LEVALBUTEROL 1.25 MG: 1.25 SOLUTION RESPIRATORY (INHALATION) at 00:00

## 2024-11-14 RX ADMIN — AMLODIPINE BESYLATE 5 MG: 5 TABLET ORAL at 13:31

## 2024-11-14 RX ADMIN — PANTOPRAZOLE SODIUM 40 MG: 40 TABLET, DELAYED RELEASE ORAL at 05:35

## 2024-11-14 RX ADMIN — GUAIFENESIN 400 MG: 400 TABLET ORAL at 09:48

## 2024-11-14 RX ADMIN — APIXABAN 5 MG: 5 TABLET, FILM COATED ORAL at 09:48

## 2024-11-14 RX ADMIN — APIXABAN 5 MG: 5 TABLET, FILM COATED ORAL at 21:02

## 2024-11-14 RX ADMIN — ARFORMOTEROL TARTRATE 15 MCG: 15 SOLUTION RESPIRATORY (INHALATION) at 20:36

## 2024-11-14 RX ADMIN — ONDANSETRON 4 MG: 4 TABLET, ORALLY DISINTEGRATING ORAL at 21:27

## 2024-11-14 RX ADMIN — BUDESONIDE 500 MCG: 0.5 SUSPENSION RESPIRATORY (INHALATION) at 08:01

## 2024-11-14 RX ADMIN — BUDESONIDE 500 MCG: 0.5 SUSPENSION RESPIRATORY (INHALATION) at 20:36

## 2024-11-14 RX ADMIN — ARFORMOTEROL TARTRATE 15 MCG: 15 SOLUTION RESPIRATORY (INHALATION) at 08:01

## 2024-11-14 RX ADMIN — IPRATROPIUM BROMIDE AND ALBUTEROL SULFATE 1 DOSE: 2.5; .5 SOLUTION RESPIRATORY (INHALATION) at 12:02

## 2024-11-14 RX ADMIN — METOPROLOL SUCCINATE 50 MG: 50 TABLET, EXTENDED RELEASE ORAL at 09:48

## 2024-11-14 ASSESSMENT — PAIN SCALES - GENERAL: PAINLEVEL_OUTOF10: 0

## 2024-11-14 NOTE — PROGRESS NOTES
Cardiovascular:      Rate and Rhythm: Normal rate. Rhythm irregular.      Heart sounds: Normal heart sounds.   Pulmonary:      Effort: Pulmonary effort is normal. Tachypnea and prolonged expiration present.      Breath sounds: Decreased breath sounds and wheezing present.   Abdominal:      Palpations: Abdomen is soft.      Tenderness: There is no abdominal tenderness.   Musculoskeletal:         General: No swelling or deformity.      Cervical back: Neck supple.   Lymphadenopathy:      Cervical: No cervical adenopathy.   Skin:     General: Skin is warm.      Findings: No rash.      Comments: +ecchymosis around right orbit   Neurological:      General: No focal deficit present.      Mental Status: She is alert and oriented to person, place, and time.   Psychiatric:         Behavior: Behavior normal.         Pertinent/ New Labs and Imaging Studies     Pulmonary Function Testing personally reviewed and interpreted.    PERTINENT LAB RESULTS: Labs reviewed.      DIAGNOSTICS: Pertinent imaging reviewed.         Assessment:      Acute on chronic hypercapnic and hypoxic respiratory failure. O2 baseline 4-5 L, home BIPAP, but not compliant.  Acute exacerbation of COPD.  Patient with advanced emphysema.  No previous PFT.  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 Will arrange for NIV device with AVAPS closer to discharge. DME is [facility].  Aerosol bronchodilators with pulmicort/brovana, xopenex for now given tachycardia  Solumedrol 40 mg q12  Bronchopulmonary hygiene  GI/DVT      Thank you for allowing me to participate in the care of Compa Obrien.   Please feel free to call with questions.     Electronically signed by eZus Mathur DO on 11/13/2024 at

## 2024-11-14 NOTE — CARE COORDINATION
Transferred from MICU yesterday. Here after a fall. Met with patient and daughter Winifred to discuss discharge plan. Patient currently sitting up in chair using oxygen 6 liters. Facial ecchymosis. Discussed pt kelleal.Asked therapy to see again tomorrow as she had not ambulated. Plan remains home woth [facility] with soc 11/17. She has oxygen @ home thru [employer] @ 5 liters and with anxiety goes up to 6. Her daughter Winifred will transport home.Electronically signed by Lynn Mayo RN on 11/14/2024 at 1:20 PM

## 2024-11-14 NOTE — DISCHARGE INSTRUCTIONS
Follow up with your primary cardiologist Dr Mancera on November 18, 2024 @ 9:45 am in regards to the PFO noted on her heart ultrasound and recent hospitalization.

## 2024-11-14 NOTE — PROGRESS NOTES
Physical Therapy  Physical Therapy Treatment    Name: Compa Obrien  : 1945  MRN: [id number]      Date of Service: 2024    Evaluating PT:  Bj Holley, PT, DPT  [id number]     Room #:  8516/8516-B  Diagnosis:  Orbital floor (blow-out) closed fracture [S02.30XA]  Fracture of orbital floor, unspecified side, initial encounter for closed fracture [S02.30XA]  PMHx/PSHx:   has a past medical history of Hypertension and Stroke (HCC).   has a past surgical history that includes Tonsillectomy.  Procedure/Surgery:  None  Precautions:  Falls, O2 vs BiPAP, maintain SpO2 between 88-92%, hx of CVA, 7cm AAA (refusing intervention)  Equipment Needs:  TBD    Goes by \"Granny\"     SUBJECTIVE:    Pt lives alone in a 1 story home with a first floor setup and no stairs to enter.  Pt ambulated with FWW PTA. Pt's dtr lives next door and is able to provide 24/7 PRN. Pt is on 5L O2 vs BiPAP at baseline.    OBJECTIVE:   Initial Evaluation  Date: 2024 Treatment 2024 Short Term/ Long Term   Goals   AM-PAC 6 Clicks     Was pt agreeable to Eval/treatment? Yes Yes    Does pt have pain? None reported None reported    Bed Mobility  Rolling: SBA  Supine to sit: SBA  Sit to supine: NT  Scooting: SBA Rolling: SBA  Supine to sit: SBA  Sit to supine: NT  Scooting: SBA Rolling: Independent  Supine to sit: Independent  Sit to supine: Independent  Scooting: Independent   Transfers Sit to stand: Mary  Stand to sit: Mary  Stand pivot: Mary with FWW Sit to stand: Mary  Stand to sit: Mary  Stand pivot: Mary with HHA Sit to stand: Independent  Stand to sit: Independent  Stand pivot: Mod I with FWW   Ambulation    Few steps to chair with FWW Mary 10' with FWW Mary >25 feet with FWW Mod I   Stair negotiation: ascended and descended NT NT >2 steps with 1 rail Mod I   ROM BUE:  Per OT eval  BLE:  WFL     Strength BUE:  Per OT eval  BLE:  Grossly 5/5     Balance Sitting EOB:  SBA  Dynamic Standing:  Mary with FWW Sitting

## 2024-11-14 NOTE — PROGRESS NOTES
4 Eyes Skin Assessment     NAME:  Compa Obrien  YOB: 1945  MEDICAL RECORD NUMBER:  [id number]    The patient is being assessed for  Transfer to New Unit    I agree that at least one RN has performed a thorough Head to Toe Skin Assessment on the patient. ALL assessment sites listed below have been assessed.      Areas assessed by both nurses:    Head, Face, Ears, Shoulders, Back, Chest, Arms, Elbows, Hands, Sacrum. Buttock, Coccyx, Ischium, Legs. Feet and Heels, and Under Medical Devices         Does the Patient have a Wound? No noted wound(s)       Joe Prevention initiated by RN: Yes  Wound Care Orders initiated by RN: No    Pressure Injury (Stage 3,4, Unstageable, DTI, NWPT, and Complex wounds) if present, place Wound referral order by RN under : No    New Ostomies, if present place, Ostomy referral order under : No     Nurse 1 eSignature: Electronically signed by Karrie Clark RN on 11/14/24 at 12:01 AM EST    **SHARE this note so that the co-signing nurse can place an eSignature**    Nurse 2 eSignature: Electronically signed by Tami Garg RN on 11/14/24 at 12:08 AM EST

## 2024-11-14 NOTE — PROGRESS NOTES
Marcel Lew M.D.,[facility]  Cristofer Hutchinson D.O., F.SARAH.JAKI., [facility]  Jayesh Olivera M.D.  Christi Haque M.D.   Zeus Mathur D.O.  Umer Bustamante M.D.         Daily Pulmonary Progress Note    Patient:  Compa Obrien 78 y.o. female MRN: [id number]            Synopsis     We are following patient for COPD exacerbation    \"CC\" shortness of breath    Code status: Full      Subjective      Patient was seen and examined.  Sitting up in bed mild dyspnea at rest.  Oxygen 5 L nasal cannula.  Using AVAPS overnight for respiratory support.  Patient will likely need device upon discharge to home.  She has previously failed BiPAP settings with recurrent hospitalizations and will likely need AVAPS mode for treatment of hypercapnic respiratory failure.    [place] ABG: pH 7.23, pCO2 84.1, pO2 160, HCO3 39  repeat ABG improved: 7.28/70.9/83/33       Review of Systems:  Constitutional: Denies fever, weight loss, night sweats, and fatigue  Skin: Denies pigmentation, dark lesions, and rashes   HEENT: Denies hearing loss, tinnitus, ear drainage, epistaxis, sore throat, and hoarseness.  Cardiovascular: Denies palpitations, chest pain, and chest pressure.  Respiratory: dyspnea, accessory muscle use  Gastrointestinal: Denies nausea, vomiting, poor appetite, diarrhea, heartburn or reflux  Genitourinary: Denies dysuria, frequency, urgency or hematuria  Musculoskeletal: Denies myalgias, muscle weakness, and bone pain  Neurological: Denies dizziness, vertigo, headache, and focal weakness  Psychological: Denies anxiety and depression  Endocrine: Denies heat intolerance and cold intolerance  Hematopoietic/Lymphatic: Denies bleeding problems and blood transfusions    24-hour events:  They none    Objective   OBJECTIVE:   BP (!) 164/82   Pulse 72   Temp 98.6 °F (37 °C) (Oral)   Resp 21   Ht 1.473 m (4' 10\")   Wt 57 kg (125 lb 10.6 oz)   LMP  (LMP Unknown)   SpO2 94%   BMI 26.26 kg/m²   SpO2 Readings from Last 1  normal. Normal wall thickness. Normal wall motion. Normal diastolic function.    Left Atrium: Left atrium size is normal. No thrombus.    Interatrial Septum: No interatrial shunt visualized with color Doppler. Agitated saline study was positive for right to left shunt.    Aortic Valve: Moderately thickened leaflets with areas of calcification. No significant stenosis.    Mitral Valve: Mild annular calcification.    Pericardium: No pericardial effusion.    No intracardiac mass.    Prior out-side study done 7/2024.       Labs:  Lab Results   Component Value Date/Time    WBC 5.1 11/14/2024 04:55 AM    RBC 3.26 11/14/2024 04:55 AM    HGB 9.9 11/14/2024 04:55 AM    HCT 32.8 11/14/2024 04:55 AM    .6 11/14/2024 04:55 AM    MCH 30.4 11/14/2024 04:55 AM    MCHC 30.2 11/14/2024 04:55 AM    RDW 14.3 11/14/2024 04:55 AM     11/14/2024 04:55 AM    MPV 10.2 11/14/2024 04:55 AM     Lab Results   Component Value Date/Time     11/14/2024 04:55 AM    K 4.6 11/14/2024 04:55 AM    CL 94 11/14/2024 04:55 AM    CO2 37 11/14/2024 04:55 AM    BUN 22 11/14/2024 04:55 AM    CREATININE 0.7 11/14/2024 04:55 AM    CALCIUM 9.5 11/14/2024 04:55 AM    GFRAA >60 07/02/2021 12:00 PM    LABGLOM 90 11/14/2024 04:55 AM    LABGLOM 45 03/26/2024 04:35 PM     Lab Results   Component Value Date/Time    PROTIME 10.7 11/13/2024 03:53 AM    INR 1.0 11/13/2024 03:53 AM     Recent Labs     11/12/24  0006   PROBNP 984*     No results for input(s): \"TROPONINI\" in the last 72 hours.  Recent Labs     11/12/24  0006   PROCAL 0.06     This SmartLink has not been configured with any valid records.       Micro:  No results for input(s): \"CULTRESP\" in the last 72 hours.  No results for input(s): \"LABGRAM\" in the last 72 hours.  No results for input(s): \"LEGUR\" in the last 72 hours.  No results for input(s): \"STREPNEUMAGU\" in the last 72 hours.  No results for input(s): \"LP1UAG\" in the last 72 hours.     Assessment:    Acute on chronic hypercapnic

## 2024-11-14 NOTE — PROGRESS NOTES
Perfect serve Dana Boudreaux with [facility] cardiology about echo result.    Per Dana Boudreaux NP, will arrange follow up outpatient.

## 2024-11-14 NOTE — PROGRESS NOTES
Occupational Therapy  OT BEDSIDE TREATMENT NOTE   [facility]  [address], [place]       Date:2024  Patient Name: Compa Obrien  MRN: [id number]  : 1945  Room: [address] [place] [address]     Per OT Eval:    Evaluating OT: Des [place] OTR/L; [id number]      Referring Provider: Jayde Hayes APRN - CNP    Specific Provider Orders/Date: OT eval and treat (24 2300)     Diagnosis: Orbital floor (blow-out) closed fracture [S02.30XA]  Fracture of orbital floor, unspecified side, initial encounter for closed fracture [S02.30XA]      Reason for admission: Pt transferred from [facility] ICU 2/2 COPD exacerbation & R orbital floor fx 2/2 fall (pt chart, pt reported falling 2x on floor within 24 hrs).     Surgery/Procedures: None this admission.      Pertinent Medical History:    Past Medical History        Past Medical History:   Diagnosis Date    Hypertension      Stroke (HCC)           *Precautions:  Fall Risk, AAA, monitor HR/SpO2 5L baseline (dr munguia 88-92%), R orbital fx     Assessment of current deficits   [x] Functional mobility          [x]ADLs           [x] Strength                  []Cognition   [x] Functional transfers        [x] IADLs         [x] Safety Awareness   [x]Endurance   [] Fine Coordination           [] ROM           [] Vision/perception    []Sensation     []Gross Motor Coordination [x] Balance    [] Delirium                  []Motor Control     [] Communication     OT PLAN OF CARE   OT POC based on physician orders, patient diagnosis and results of clinical assessment.        Frequency/Duration: 1-3 days/wk for 1-2 weeks PRN    Specific OT Treatment Interventions to include:   * Instruction/training on adapted ADL techniques and AE recommendations to increase functional independence within precautions       * Training on energy conservation strategies, correct breathing pattern and techniques to improve  overall RUE strength WFL for participation in functional tasks         LUE WFL 4/5  : Good  FMC: Fair+  GMC: Fair+ Improve overall LUE strength WFL for participation in functional tasks            Education:  Pt was educated through out treatment regarding proper technique & safety with bed mobility, functional transfers & light mobility with use of walker, energy conservation techniques & ADL compensatory strategies to ease tasks, improve safety & prevent falls to return home safely.  Fair carryover noted.    Comments: Upon arrival pt supine in bed. Skilled interventions to include analysis of bed mobility, functional transfer and mobility training, skilled monitoring of vitals, and ADL retraining including dressing, grooming, and toileting tasks. Pt would benefit from continued skilled OT services to promote increased safety and independence with ADLs/IADLs and functional mobility and transfers. At end of session pt seated in chair, chair alarm on, all lines and tubes intact, call light within reach. Nsg informed.     Pt has made fair progress towards set goals.   Continue with current plan of care    Treatment Time In:1212            Treatment Time Out: 1237           Treatment Charges: Mins Units   Ther Ex  23017     Manual Therapy 64729     Thera Activities 86960 15 1   ADL/Home Mgt 68018 10 1   Neuro Re-ed 31324     Group Therapy      Orthotic manage/training  16944     Non-Billable Time     Total Timed Treatment 25 2     Jose GRUBBS/SKYLAR [place]/CAMPBELL [id number]

## 2024-11-14 NOTE — PROGRESS NOTES
Dr Mancera's (primary cardiologist) office called and appointment made for patient November 18, 2024 @ 0945.  Charge RN notified and information placed in patient's discharge instructions.   Electronically signed by ODALYS SUN on 11/14/2024 at 12:59 PM

## 2024-11-14 NOTE — PROGRESS NOTES
[facility]                                Progress note    Subjective:    The patient is eating lunch in chair with family at bedside.    Has no complaints    Objective:    BP (!) 141/76   Pulse 86   Temp 97.3 °F (36.3 °C) (Temporal)   Resp 19   Ht 1.473 m (4' 10\")   Wt 57 kg (125 lb 10.6 oz)   LMP  (LMP Unknown)   SpO2 97%   BMI 26.26 kg/m²     In: 1016.9 [P.O.:1012; I.V.:4.9]  Out: 1250   In: 1016.9   Out: 1250 [Urine:1250]    General appearance: NAD, conversant interactive, somewhat short of breath  Eyes: Sclerae anicteric, PERRLA-right eye with significant ecchymoses from trauma/fall  HEENT: AT/NC, MMM  Neck: FROM, supple, no thyromegaly  Lymph: No cervical / supraclavicular lymphadenopathy  Lungs: No wheeze but markedly diminished breath sounds bilateral lung fields/ left  more diminished than right, 6L NC  CV: RRR, no MRGs, no lower extremity edema  Abdomen: Soft, non-tender; no masses or HSM, +BS  Extremities: FROM without synovitis.  No clubbing or cyanosis of the hands.  Skin: no rash, induration, lesions, or ulcers  Psych: Calm and cooperative.  Normal judgement and insight.  Normal mood and affect.  Neuro: Alert and interactive, face symmetric, speech fluent     Recent Labs     11/12/24  0417 11/12/24  0551 11/13/24  0353 11/14/24  0455   WBC 4.8  --  5.9 5.1   HGB 7.0* 8.3* 9.7* 9.9*   HCT 24.7* 27.9* 32.4* 32.8*     --  344 373       Recent Labs     11/12/24  0534 11/13/24  0353 11/14/24  0455    137 139   K 4.4 4.5 4.6   CL 93* 91* 94*   CO2 38* 33* 37*   BUN 24* 24* 22   CREATININE 0.8 0.6 0.7   CALCIUM 8.8 9.0 9.5       Assessment:    Principal Problem:    Orbital floor (blow-out) closed fracture  Active Problems:    Fracture of orbital floor, unspecified side, initial encounter for closed fracture  Resolved Problems:    * No resolved hospital problems. *      Plan:      PLAN:  Noninvasive ventilation bipap / avaps /high flow nasal cannula/Airvo, would be a

## 2024-11-14 NOTE — PLAN OF CARE
Problem: Chronic Conditions and Co-morbidities  Goal: Patient's chronic conditions and co-morbidity symptoms are monitored and maintained or improved  11/13/2024 1909 by Maru Caldwell RN  Outcome: Progressing  11/13/2024 0859 by Carmen Ramirez RN  Outcome: Progressing  Flowsheets (Taken 11/13/2024 0800)  Care Plan - Patient's Chronic Conditions and Co-Morbidity Symptoms are Monitored and Maintained or Improved:   Monitor and assess patient's chronic conditions and comorbid symptoms for stability, deterioration, or improvement   Update acute care plan with appropriate goals if chronic or comorbid symptoms are exacerbated and prevent overall improvement and discharge   Collaborate with multidisciplinary team to address chronic and comorbid conditions and prevent exacerbation or deterioration     Problem: Discharge Planning  Goal: Discharge to home or other facility with appropriate resources  11/13/2024 1909 by Maru Caldwell RN  Outcome: Progressing  11/13/2024 0859 by Carmen Ramirez RN  Outcome: Progressing  Flowsheets (Taken 11/13/2024 0800)  Discharge to home or other facility with appropriate resources:   Identify barriers to discharge with patient and caregiver   Arrange for needed discharge resources and transportation as appropriate   Arrange for interpreters to assist at discharge as needed   Identify discharge learning needs (meds, wound care, etc)   Refer to discharge planning if patient needs post-hospital services based on physician order or complex needs related to functional status, cognitive ability or social support system     Problem: Skin/Tissue Integrity  Goal: Absence of new skin breakdown  Description: 1.  Monitor for areas of redness and/or skin breakdown  2.  Assess vascular access sites hourly  3.  Every 4-6 hours minimum:  Change oxygen saturation probe site  4.  Every 4-6 hours:  If on nasal continuous positive airway pressure, respiratory therapy assess nares and determine need for  appliance change or resting period.  11/13/2024 1909 by Maru Caldwell RN  Outcome: Progressing  11/13/2024 0859 by Carmen Ramirez RN  Outcome: Progressing     Problem: Pain  Goal: Verbalizes/displays adequate comfort level or baseline comfort level  11/13/2024 1909 by Maru Caldwell RN  Outcome: Progressing  11/13/2024 0859 by Carmen Ramirez RN  Outcome: Progressing  Flowsheets (Taken 11/13/2024 0800)  Verbalizes/displays adequate comfort level or baseline comfort level:   Encourage patient to monitor pain and request assistance   Assess pain using appropriate pain scale   Administer analgesics based on type and severity of pain and evaluate response   Implement non-pharmacological measures as appropriate and evaluate response   Consider cultural and social influences on pain and pain management   Notify Licensed Independent Practitioner if interventions unsuccessful or patient reports new pain     Problem: Safety - Adult  Goal: Free from fall injury  11/13/2024 1909 by Maru Caldwell RN  Outcome: Progressing  11/13/2024 0859 by Carmen Ramirez RN  Outcome: Progressing  Flowsheets (Taken 11/13/2024 0800)  Free From Fall Injury:   Instruct family/caregiver on patient safety   Based on caregiver fall risk screen, instruct family/caregiver to ask for assistance with transferring infant if caregiver noted to have fall risk factors     Problem: ABCDS Injury Assessment  Goal: Absence of physical injury  11/13/2024 1909 by Maru Caldwell RN  Outcome: Progressing  11/13/2024 0859 by Carmen Ramirez RN  Outcome: Progressing  Flowsheets (Taken 11/13/2024 0800)  Absence of Physical Injury: Implement safety measures based on patient assessment

## 2024-11-15 ENCOUNTER — APPOINTMENT (OUTPATIENT)
Dept: GENERAL RADIOLOGY | Age: 79
End: 2024-11-15
Attending: INTERNAL MEDICINE
Payer: COMMERCIAL

## 2024-11-15 VITALS
SYSTOLIC BLOOD PRESSURE: 126 MMHG | BODY MASS INDEX: 26.38 KG/M2 | TEMPERATURE: 97.8 F | DIASTOLIC BLOOD PRESSURE: 88 MMHG | HEART RATE: 100 BPM | WEIGHT: 125.66 LBS | OXYGEN SATURATION: 93 % | HEIGHT: 58 IN | RESPIRATION RATE: 18 BRPM

## 2024-11-15 LAB
ALBUMIN SERPL-MCNC: 3.8 G/DL (ref 3.5–5.2)
ALP SERPL-CCNC: 95 U/L (ref 35–104)
ALT SERPL-CCNC: 11 U/L (ref 0–32)
ANION GAP SERPL CALCULATED.3IONS-SCNC: 11 MMOL/L (ref 7–16)
AST SERPL-CCNC: 15 U/L (ref 0–31)
BASOPHILS # BLD: 0 K/UL (ref 0–0.2)
BASOPHILS NFR BLD: 0 % (ref 0–2)
BILIRUB SERPL-MCNC: 0.3 MG/DL (ref 0–1.2)
BUN SERPL-MCNC: 18 MG/DL (ref 6–23)
CALCIUM SERPL-MCNC: 9.9 MG/DL (ref 8.6–10.2)
CHLORIDE SERPL-SCNC: 91 MMOL/L (ref 98–107)
CO2 SERPL-SCNC: 37 MMOL/L (ref 22–29)
CREAT SERPL-MCNC: 0.8 MG/DL (ref 0.5–1)
EKG ATRIAL RATE: 144 BPM
EKG Q-T INTERVAL: 304 MS
EKG QRS DURATION: 80 MS
EKG QTC CALCULATION (BAZETT): 477 MS
EKG R AXIS: 54 DEGREES
EKG T AXIS: -118 DEGREES
EKG VENTRICULAR RATE: 148 BPM
EOSINOPHIL # BLD: 0 K/UL (ref 0.05–0.5)
EOSINOPHILS RELATIVE PERCENT: 0 % (ref 0–6)
ERYTHROCYTE [DISTWIDTH] IN BLOOD BY AUTOMATED COUNT: 14.5 % (ref 11.5–15)
GFR, ESTIMATED: 76 ML/MIN/1.73M2
GLUCOSE SERPL-MCNC: 128 MG/DL (ref 74–99)
HCT VFR BLD AUTO: 33.4 % (ref 34–48)
HGB BLD-MCNC: 10.3 G/DL (ref 11.5–15.5)
LYMPHOCYTES NFR BLD: 0.31 K/UL (ref 1.5–4)
LYMPHOCYTES RELATIVE PERCENT: 5 % (ref 20–42)
MAGNESIUM SERPL-MCNC: 2.1 MG/DL (ref 1.6–2.6)
MCH RBC QN AUTO: 30.7 PG (ref 26–35)
MCHC RBC AUTO-ENTMCNC: 30.8 G/DL (ref 32–34.5)
MCV RBC AUTO: 99.7 FL (ref 80–99.9)
MONOCYTES NFR BLD: 0.05 K/UL (ref 0.1–0.95)
MONOCYTES NFR BLD: 1 % (ref 2–12)
NEUTROPHILS NFR BLD: 94 % (ref 43–80)
NEUTS SEG NFR BLD: 5.54 K/UL (ref 1.8–7.3)
PHOSPHATE SERPL-MCNC: 3.1 MG/DL (ref 2.5–4.5)
PLATELET # BLD AUTO: 392 K/UL (ref 130–450)
PMV BLD AUTO: 10.3 FL (ref 7–12)
POTASSIUM SERPL-SCNC: 4 MMOL/L (ref 3.5–5)
PROT SERPL-MCNC: 6.9 G/DL (ref 6.4–8.3)
RBC # BLD AUTO: 3.35 M/UL (ref 3.5–5.5)
RBC # BLD: ABNORMAL 10*6/UL
SODIUM SERPL-SCNC: 139 MMOL/L (ref 132–146)
WBC OTHER # BLD: 5.9 K/UL (ref 4.5–11.5)

## 2024-11-15 PROCEDURE — 6360000002 HC RX W HCPCS

## 2024-11-15 PROCEDURE — 85025 COMPLETE CBC W/AUTO DIFF WBC: CPT

## 2024-11-15 PROCEDURE — 71045 X-RAY EXAM CHEST 1 VIEW: CPT

## 2024-11-15 PROCEDURE — 2580000003 HC RX 258

## 2024-11-15 PROCEDURE — 36415 COLL VENOUS BLD VENIPUNCTURE: CPT

## 2024-11-15 PROCEDURE — 80053 COMPREHEN METABOLIC PANEL: CPT

## 2024-11-15 PROCEDURE — 94640 AIRWAY INHALATION TREATMENT: CPT

## 2024-11-15 PROCEDURE — 2700000000 HC OXYGEN THERAPY PER DAY

## 2024-11-15 PROCEDURE — 6370000000 HC RX 637 (ALT 250 FOR IP): Performed by: NURSE PRACTITIONER

## 2024-11-15 PROCEDURE — 97530 THERAPEUTIC ACTIVITIES: CPT

## 2024-11-15 PROCEDURE — 6370000000 HC RX 637 (ALT 250 FOR IP)

## 2024-11-15 PROCEDURE — 84100 ASSAY OF PHOSPHORUS: CPT

## 2024-11-15 PROCEDURE — 6370000000 HC RX 637 (ALT 250 FOR IP): Performed by: INTERNAL MEDICINE

## 2024-11-15 PROCEDURE — 97535 SELF CARE MNGMENT TRAINING: CPT

## 2024-11-15 PROCEDURE — 94660 CPAP INITIATION&MGMT: CPT

## 2024-11-15 PROCEDURE — 83735 ASSAY OF MAGNESIUM: CPT

## 2024-11-15 RX ORDER — METOPROLOL SUCCINATE 50 MG/1
50 TABLET, EXTENDED RELEASE ORAL DAILY
Qty: 30 TABLET | Refills: 0 | Status: SHIPPED | OUTPATIENT
Start: 2024-11-16

## 2024-11-15 RX ADMIN — GUAIFENESIN 400 MG: 400 TABLET ORAL at 08:38

## 2024-11-15 RX ADMIN — BUDESONIDE 500 MCG: 0.5 SUSPENSION RESPIRATORY (INHALATION) at 09:23

## 2024-11-15 RX ADMIN — WATER 40 MG: 1 INJECTION INTRAMUSCULAR; INTRAVENOUS; SUBCUTANEOUS at 01:27

## 2024-11-15 RX ADMIN — METOPROLOL SUCCINATE 50 MG: 50 TABLET, EXTENDED RELEASE ORAL at 08:38

## 2024-11-15 RX ADMIN — IPRATROPIUM BROMIDE AND ALBUTEROL SULFATE 1 DOSE: 2.5; .5 SOLUTION RESPIRATORY (INHALATION) at 09:23

## 2024-11-15 RX ADMIN — GUAIFENESIN 400 MG: 400 TABLET ORAL at 13:57

## 2024-11-15 RX ADMIN — PANTOPRAZOLE SODIUM 40 MG: 40 TABLET, DELAYED RELEASE ORAL at 06:38

## 2024-11-15 RX ADMIN — WATER 40 MG: 1 INJECTION INTRAMUSCULAR; INTRAVENOUS; SUBCUTANEOUS at 13:57

## 2024-11-15 RX ADMIN — CLOPIDOGREL BISULFATE 75 MG: 75 TABLET ORAL at 08:38

## 2024-11-15 RX ADMIN — IPRATROPIUM BROMIDE AND ALBUTEROL SULFATE 1 DOSE: 2.5; .5 SOLUTION RESPIRATORY (INHALATION) at 12:59

## 2024-11-15 RX ADMIN — APIXABAN 5 MG: 5 TABLET, FILM COATED ORAL at 08:38

## 2024-11-15 RX ADMIN — AMLODIPINE BESYLATE 5 MG: 5 TABLET ORAL at 08:38

## 2024-11-15 RX ADMIN — FUROSEMIDE 20 MG: 40 TABLET ORAL at 08:38

## 2024-11-15 RX ADMIN — IPRATROPIUM BROMIDE AND ALBUTEROL SULFATE 1 DOSE: 2.5; .5 SOLUTION RESPIRATORY (INHALATION) at 16:28

## 2024-11-15 RX ADMIN — ARFORMOTEROL TARTRATE 15 MCG: 15 SOLUTION RESPIRATORY (INHALATION) at 09:23

## 2024-11-15 RX ADMIN — SODIUM CHLORIDE, PRESERVATIVE FREE 10 ML: 5 INJECTION INTRAVENOUS at 08:39

## 2024-11-15 NOTE — PROGRESS NOTES
Physical Therapy  Physical Therapy Treatment    Name: Compa Obrien  : 1945  MRN: [id number]      Date of Service: 11/15/2024    Evaluating PT:  Bj Holley, PT, DPT  [id number]     Room #:  8516/8516-B  Diagnosis:  Orbital floor (blow-out) closed fracture [S02.30XA]  Fracture of orbital floor, unspecified side, initial encounter for closed fracture [S02.30XA]  PMHx/PSHx:   has a past medical history of Hypertension and Stroke (HCC).   has a past surgical history that includes Tonsillectomy.  Procedure/Surgery:  None  Precautions:  Falls, O2 vs BiPAP, maintain SpO2 between 88-92%, hx of CVA, 7cm AAA (refusing intervention)  Equipment Needs:  TBD    Goes by \"Granny\"     SUBJECTIVE:    Pt lives alone in a 1 story home with a first floor setup and no stairs to enter.  Pt ambulated with FWW PTA. Pt's dtr lives next door and is able to provide 24/7 PRN. Pt is on 5L O2 vs BiPAP at baseline.    OBJECTIVE:   Initial Evaluation  Date: 2024 Treatment 11/15/2024 Short Term/ Long Term   Goals   AM-PAC 6 Clicks     Was pt agreeable to Eval/treatment? Yes Yes    Does pt have pain? None reported Facial pain     Bed Mobility  Rolling: SBA  Supine to sit: SBA  Sit to supine: NT  Scooting: SBA Rolling: SBA  Supine to sit: SBA  Sit to supine: NT  Scooting: SBA Rolling: Independent  Supine to sit: Independent  Sit to supine: Independent  Scooting: Independent   Transfers Sit to stand: Mary  Stand to sit: Mary  Stand pivot: Mary with FWW Sit to stand: Mray  Stand to sit: Mary  Stand pivot: Mary with HHA Sit to stand: Independent  Stand to sit: Independent  Stand pivot: Mod I with FWW   Ambulation    Few steps to chair with FWW Mary 25'x2 with FWW Mary >25 feet with FWW Mod I   Stair negotiation: ascended and descended NT NT >2 steps with 1 rail Mod I   ROM BUE:  Per OT eval  BLE:  WFL     Strength BUE:  Per OT eval  BLE:  Grossly 5/5     Balance Sitting EOB:  SBA  Dynamic Standing:  Mary with FWW Sitting

## 2024-11-15 NOTE — DISCHARGE SUMMARY
mouth daily     dapagliflozin 10 MG tablet  Commonly known as: FARXIGA  Take 1 tablet by mouth every morning     ergocalciferol 1.25 MG (43824 UT) capsule  Commonly known as: ERGOCALCIFEROL     folic acid 1 MG tablet  Commonly known as: FOLVITE  TAKE ONE TABLET BY MOUTH EVERY DAY     furosemide 20 MG tablet  Commonly known as: LASIX  Take 1 tablet by mouth daily     Handicap Placard Misc  by Does not apply route It is my medical opinion that Compa Obrien requires a disability parking placard for the following reasons:  She cannot walk 200 feet without stopping to rest.  Duration of need: 5 year     magnesium hydroxide 400 MG/5ML suspension  Commonly known as: MILK OF MAGNESIA     multivitamin with minerals tablet     omeprazole 20 MG delayed release capsule  Commonly known as: PRILOSEC  Take 1 capsule by mouth every morning (before breakfast)     OXYGEN     revefenacin 175 MCG/3ML Soln     sucralfate 1 GM tablet  Commonly known as: Carafate  Take 1 tablet by mouth 4 times daily            STOP taking these medications      aspirin 81 MG EC tablet     ipratropium 0.5 mg-albuterol 2.5 mg 0.5-2.5 (3) MG/3ML Soln nebulizer solution  Commonly known as: DUONEB     metoprolol tartrate 50 MG tablet  Commonly known as: LOPRESSOR               Where to Get Your Medications        These medications were sent to [id number] - [place], [place] - [address] [facility] - P [phone] - F [phone]  [address] [place] [address] [place] [address]      Phone: [phone]   albuterol (2.5 MG/3ML) 0.083% nebulizer solution  arformoterol tartrate 15 MCG/2ML Nebu  guaiFENesin 400 MG tablet  predniSONE 10 MG tablet       These medications were sent to [facility] [address] - P [phone] - F [phone]  [facility] [address] [facility] [address]      Phone: [phone]   apixaban 5 MG Tabs tablet  metoprolol succinate 50 MG extended release tablet       Activity: activity as tolerated  Diet: regular diet    Pt  has been advised to:    Follow-up with Nigel Alvarado,  in 1 week.  Follow-up with consultants as recommended by them    Note that over 35 minutes was spent in preparing discharge papers, discussing discharge with patient, medication review, etc.    Signed:  MARIANNE Lee CNP  11/15/2024  11:55 AM    Above note edited to reflect my thoughts     I personally provided care for the patient. Radiographs, labs and medication list were reviewed by me independently.  The case was discussed in detail and plans for care were established. Review of CAROL Lee   , documentation was conducted and revisions were made as appropriate directly by me. I agree with the above documented exam, problem list, and plan of care.     Ceci Mccarty MD

## 2024-11-15 NOTE — PROGRESS NOTES
Occupational Therapy  OT BEDSIDE TREATMENT NOTE   [facility]  [address], [place]       Date:11/15/2024  Patient Name: Compa Obrien  MRN: [id number]  : 1945  Room: [address] [place] [address]     Per OT Eval:    Evaluating OT: Des Elizabeth OTR/L; [id number]      Referring Provider: Jayde Hayes APRN - CNP    Specific Provider Orders/Date: OT eval and treat (24 2300)     Diagnosis: Orbital floor (blow-out) closed fracture [S02.30XA]  Fracture of orbital floor, unspecified side, initial encounter for closed fracture [S02.30XA]      Reason for admission: Pt transferred from [facility] ICU 2/2 COPD exacerbation & R orbital floor fx 2/2 fall (pt chart, pt reported falling 2x on floor within 24 hrs).     Surgery/Procedures: None this admission.      Pertinent Medical History:    Past Medical History        Past Medical History:   Diagnosis Date    Hypertension      Stroke (HCC)           *Precautions:  Fall Risk, AAA, monitor HR/SpO2 5L baseline (dr munguia 88-92%), R orbital fx     Assessment of current deficits   [x] Functional mobility          [x]ADLs           [x] Strength                  []Cognition   [x] Functional transfers        [x] IADLs         [x] Safety Awareness   [x]Endurance   [] Fine Coordination           [] ROM           [] Vision/perception    []Sensation     []Gross Motor Coordination [x] Balance    [] Delirium                  []Motor Control     [] Communication     OT PLAN OF CARE   OT POC based on physician orders, patient diagnosis and results of clinical assessment.        Frequency/Duration: 1-3 days/wk for 1-2 weeks PRN    Specific OT Treatment Interventions to include:   * Instruction/training on adapted ADL techniques and AE recommendations to increase functional independence within precautions       * Training on energy conservation strategies, correct breathing pattern and techniques to improve  strength WFL for participation in functional tasks         LUE WFL 4/5  : Good  FMC: Fair+  GMC: Fair+ Improve overall LUE strength WFL for participation in functional tasks            Education:  Pt was educated through out treatment regarding proper technique & safety with bed mobility, functional transfers & mobility with use of walker, energy conservation techniques, importance of OOB activity & ADL compensatory strategies to ease tasks, improve safety & prevent falls to return home safely.  Fair carryover noted.    Comments: Upon arrival pt supine in bed, sleeping but easy to arouse and agreeable for therapy. Skilled interventions to include analysis of bed mobility, functional transfer and mobility training, skilled monitoring of vitals, and ADL retraining including dressing, grooming, and toileting tasks. Pt would benefit from continued skilled OT services to promote increased safety and independence with ADLs/IADLs and functional mobility and transfers. At end of session pt seated in chair, chair alarm on, all lines and tubes intact, call light within reach. Nsg informed.     Pt has made fair progress towards set goals.   Continue with current plan of care    Treatment Time In: 904            Treatment Time Out: 927         Treatment Charges: Mins Units   Ther Ex  04488     Manual Therapy 17612     Thera Activities 33713 13 1   ADL/Home Mgt 44457 10 1   Neuro Re-ed 73703     Group Therapy      Orthotic manage/training  55241     Non-Billable Time     Total Timed Treatment 23 2     SKYLAR Martinez/CAMPBELL [id number]  Des Elizabeth OTR/L; [id number]

## 2024-11-15 NOTE — PROGRESS NOTES
Marcel Lew M.D.,[facility]  Cristofer Hutchinson D.O., LIZZIE., [facility]  Jayesh Olivera M.D.  Christi Haque M.D.   Zeus Mathur D.O.  Umer Bustamante M.D.         Daily Pulmonary Progress Note    Patient:  Compa Obrien 78 y.o. female MRN: [id number]            Synopsis     We are following patient for COPD exacerbation    \"CC\" shortness of breath    Code status: Full      Subjective   \"Granny\"   Patient was seen and examined.  Sitting up in the chair.  States her breathing is much improved today..  Oxygen 6 L nasal cannula.  Using AVAPS overnight for respiratory support.  DME equipment for NIV through [employer].    Review of Systems:  Constitutional: Denies fever, weight loss, night sweats, and fatigue  Skin: Denies pigmentation, dark lesions, and rashes   HEENT: Denies hearing loss, tinnitus, ear drainage, epistaxis, sore throat, and hoarseness.  Cardiovascular: Denies palpitations, chest pain, and chest pressure.  Respiratory: dyspnea, accessory muscle use  Gastrointestinal: Denies nausea, vomiting, poor appetite, diarrhea, heartburn or reflux  Genitourinary: Denies dysuria, frequency, urgency or hematuria  Musculoskeletal: Denies myalgias, muscle weakness, and bone pain  Neurological: Denies dizziness, vertigo, headache, and focal weakness  Psychological: Denies anxiety and depression  Endocrine: Denies heat intolerance and cold intolerance  Hematopoietic/Lymphatic: Denies bleeding problems and blood transfusions    24-hour events:  They none    Objective   OBJECTIVE:   BP (!) 149/88   Pulse 70   Temp 97.2 °F (36.2 °C) (Temporal)   Resp 16   Ht 1.473 m (4' 10\")   Wt 57 kg (125 lb 10.6 oz)   LMP  (LMP Unknown)   SpO2 97%   BMI 26.26 kg/m²   SpO2 Readings from Last 1 Encounters:   11/15/24 97%        I/O:    Intake/Output Summary (Last 24 hours) at 11/15/2024 0935  Last data filed at 11/14/2024 2100  Gross per 24 hour   Intake 300 ml   Output 900 ml   Net -600 ml                CPAP/EPAP: 5

## 2024-11-15 NOTE — HOME CARE
Will need home care order placed prior to discharge.   Lesley Sol LPN [facility]

## 2024-11-15 NOTE — CARE COORDINATION
Care Coordination  The patient was admitted after a fall after tripping over her own o2 cannula. She also was found to have orbital floor blowout fracture as well. She is currently on 6 liters n/c sat 98 %. She does have home 02 5 liter n/c at home supplied by [facility]. Pt [facility] 16/24 and she walked 10 feet with a front wheeled walker min assist. The patient has a front wheeled walker at home. The daughter lives next door and will provide 14/7 care for her mom. Ot [facility] 17/24. Plan is home with [facility] care. Called [facility] to get a home niv ordered and the patient already has one at home since July. Perfect served Dr Mathur about this as he ordered it. Home care orders have been placed.

## 2024-11-15 NOTE — PROGRESS NOTES
CLINICAL PHARMACY NOTE: MEDS TO BEDS    Total # of Prescriptions Filled: 2   The following medications were delivered to the patient:  Eliquis 5mg  Metoprolol er succ 50    Additional Documentation:  To pt's room

## 2024-11-15 NOTE — PLAN OF CARE
Problem: Chronic Conditions and Co-morbidities  Goal: Patient's chronic conditions and co-morbidity symptoms are monitored and maintained or improved  11/15/2024 0316 by Maribell Lennon RN  Outcome: Progressing  11/14/2024 1655 by Niraj Cisneros RN  Outcome: Progressing     Problem: Discharge Planning  Goal: Discharge to home or other facility with appropriate resources  11/15/2024 0316 by Maribell Lennon RN  Outcome: Progressing  11/14/2024 1655 by Niraj Cisneros RN  Outcome: Progressing     Problem: Skin/Tissue Integrity  Goal: Absence of new skin breakdown  Description: 1.  Monitor for areas of redness and/or skin breakdown  2.  Assess vascular access sites hourly  3.  Every 4-6 hours minimum:  Change oxygen saturation probe site  4.  Every 4-6 hours:  If on nasal continuous positive airway pressure, respiratory therapy assess nares and determine need for appliance change or resting period.  11/15/2024 0316 by Maribell Lennon RN  Outcome: Progressing  11/14/2024 1655 by Niraj Cisneros RN  Outcome: Progressing     Problem: Pain  Goal: Verbalizes/displays adequate comfort level or baseline comfort level  11/15/2024 0316 by Maribell Lennon RN  Outcome: Progressing  11/14/2024 1655 by Niraj Cisneros RN  Outcome: Progressing     Problem: Safety - Adult  Goal: Free from fall injury  11/15/2024 0316 by Maribell Lennon RN  Outcome: Progressing  11/14/2024 1655 by Niraj Cisneros RN  Outcome: Progressing     Problem: ABCDS Injury Assessment  Goal: Absence of physical injury  11/15/2024 0316 by Maribell Lennon RN  Outcome: Progressing  11/14/2024 1655 by Niraj Cisneros RN  Outcome: Progressing

## 2024-12-10 ENCOUNTER — OUTSIDE SERVICES (OUTPATIENT)
Dept: PULMONOLOGY | Age: 79
End: 2024-12-10

## 2024-12-10 NOTE — PROGRESS NOTES
systolic function with a visually estimated EF of 55 - 60%. Left ventricle size is normal. Normal wall thickness. Normal wall motion. Normal diastolic function.   Left Atrium Left atrium size is normal. No thrombus.   Interatrial Septum No interatrial shunt visualized with color Doppler. Agitated saline study was positive for right to left shunt without provocation.   Right Ventricle Right ventricle size is normal. Normal systolic function.   Right Atrium Right atrium size is normal.   Aortic Valve Moderately thickened leaflets with areas of calcification. No regurgitation. No significant stenosis.   Mitral Valve Mild annular calcification. Trace regurgitation. No stenosis noted.   Tricuspid Valve Valve structure is normal. Trace to mild regurgitation.   Moderate pulmoanry hypertension, RVSP calculated at 54mmHg. No stenosis noted.   Pulmonic Valve The pulmonic valve visualization is suboptimal but appears to be functioning normally. Physiologically normal regurgitation. No stenosis noted.   Aorta Normal sized aortic root and ascending aorta.   IVC/Hepatic Veins IVC diameter is less than or equal to 21 mm and decreases less than 50% during inspiration; therefore the estimated right atrial pressure is intermediate (~8 mmHg).   Pericardium No pericardial effusion.       Labs:  Lab Results   Component Value Date/Time    WBC 5.9 11/15/2024 04:38 AM    RBC 3.35 11/15/2024 04:38 AM    HGB 10.3 11/15/2024 04:38 AM    HCT 33.4 11/15/2024 04:38 AM    MCV 99.7 11/15/2024 04:38 AM    MCH 30.7 11/15/2024 04:38 AM    MCHC 30.8 11/15/2024 04:38 AM    RDW 14.5 11/15/2024 04:38 AM     11/15/2024 04:38 AM    MPV 10.3 11/15/2024 04:38 AM     Lab Results   Component Value Date/Time     11/15/2024 04:38 AM    K 4.0 11/15/2024 04:38 AM    CL 91 11/15/2024 04:38 AM    CO2 37 11/15/2024 04:38 AM    BUN 18 11/15/2024 04:38 AM    CREATININE 0.8 11/15/2024 04:38 AM    CALCIUM 9.9 11/15/2024 04:38 AM    GFRAA >60 07/02/2021 12:00

## 2024-12-16 ENCOUNTER — OUTSIDE SERVICES (OUTPATIENT)
Dept: PRIMARY CARE CLINIC | Age: 79
End: 2024-12-16

## 2024-12-16 DIAGNOSIS — I25.10 ATHEROSCLEROSIS OF NATIVE CORONARY ARTERY WITHOUT ANGINA PECTORIS, UNSPECIFIED WHETHER NATIVE OR TRANSPLANTED HEART: ICD-10-CM

## 2024-12-16 DIAGNOSIS — J96.22 ACUTE AND CHRONIC RESPIRATORY FAILURE WITH HYPERCAPNIA: Primary | ICD-10-CM

## 2024-12-16 DIAGNOSIS — J43.9 PULMONARY EMPHYSEMA, UNSPECIFIED EMPHYSEMA TYPE (HCC): ICD-10-CM

## 2024-12-16 DIAGNOSIS — J44.1 CHRONIC OBSTRUCTIVE PULMONARY DISEASE WITH (ACUTE) EXACERBATION (HCC): ICD-10-CM

## 2024-12-16 DIAGNOSIS — K21.9 GASTRO-ESOPHAGEAL REFLUX DISEASE WITHOUT ESOPHAGITIS: ICD-10-CM

## 2024-12-16 DIAGNOSIS — I10 ESSENTIAL (PRIMARY) HYPERTENSION: ICD-10-CM

## 2024-12-16 DIAGNOSIS — I50.32 CHRONIC DIASTOLIC (CONGESTIVE) HEART FAILURE (HCC): ICD-10-CM

## 2024-12-16 DIAGNOSIS — I48.0 PAROXYSMAL ATRIAL FIBRILLATION (HCC): ICD-10-CM

## 2024-12-16 DIAGNOSIS — E78.2 MIXED HYPERLIPIDEMIA: ICD-10-CM

## 2024-12-16 DIAGNOSIS — Z99.11 DEPENDENCE ON RESPIRATOR (VENTILATOR) STATUS (HCC): ICD-10-CM

## 2024-12-18 ASSESSMENT — ENCOUNTER SYMPTOMS
SHORTNESS OF BREATH: 1
CHEST TIGHTNESS: 0
WHEEZING: 0
APNEA: 0
SINUS PAIN: 0
SORE THROAT: 0
CONSTIPATION: 0
VOMITING: 0
DIARRHEA: 0
ABDOMINAL DISTENTION: 0
EYE ITCHING: 0
BLURRED VISION: 0
TROUBLE SWALLOWING: 0
ABDOMINAL PAIN: 0
PHOTOPHOBIA: 0
BACK PAIN: 0
BLOOD IN STOOL: 0
DIFFICULTY BREATHING: 1
NAUSEA: 0
COUGH: 0
EYE DISCHARGE: 0

## 2024-12-18 ASSESSMENT — COPD QUESTIONNAIRES: COPD: 1

## 2024-12-18 NOTE — PROGRESS NOTES
Visit Date: 12/16/2024  Compa Obrien  1945  female 79 y.o.      Subjective:    CC: Patient presents with ARF. Patient presents for follow up of HTN, Afib, CAD, HLD, and GERD.    Hypertension  This is a chronic problem. The current episode started more than 1 year ago. The problem has been gradually improving since onset. The problem is controlled. Associated symptoms include malaise/fatigue and shortness of breath. Pertinent negatives include no blurred vision, chest pain, headaches, neck pain or palpitations. There are no associated agents to hypertension. Risk factors for coronary artery disease include post-menopausal state. Past treatments include beta blockers. The current treatment provides mild improvement. Compliance problems include exercise, diet and psychosocial issues.  Hypertensive end-organ damage includes CAD/MI and heart failure. There is no history of angina.   Coronary Artery Disease  Presents for follow-up visit. Symptoms include dizziness, muscle weakness and shortness of breath. Pertinent negatives include no chest pain, chest tightness, leg swelling or palpitations. The symptoms have been worsening. Compliance with diet is poor. Compliance with exercise is poor. Compliance with medications is poor.   COPD  She complains of difficulty breathing and shortness of breath. There is no chest tightness, cough or wheezing. This is a chronic problem. The current episode started more than 1 year ago. The problem occurs constantly. The problem has been waxing and waning. Associated symptoms include malaise/fatigue. Pertinent negatives include no appetite change, chest pain, ear pain, fever, headaches, myalgias, sore throat or trouble swallowing. Her symptoms are aggravated by nothing. Her symptoms are alleviated by ipratropium and steroid inhaler. She reports minimal improvement on treatment. Her past medical history is significant for COPD and emphysema.       Review of Systems

## 2024-12-23 ENCOUNTER — OUTSIDE SERVICES (OUTPATIENT)
Dept: PRIMARY CARE CLINIC | Age: 79
End: 2024-12-23

## 2024-12-23 DIAGNOSIS — J96.22 ACUTE AND CHRONIC RESPIRATORY FAILURE WITH HYPERCAPNIA: Primary | ICD-10-CM

## 2024-12-23 DIAGNOSIS — E78.2 MIXED HYPERLIPIDEMIA: ICD-10-CM

## 2024-12-23 DIAGNOSIS — I48.0 PAROXYSMAL ATRIAL FIBRILLATION (HCC): ICD-10-CM

## 2024-12-23 DIAGNOSIS — I50.32 CHRONIC DIASTOLIC (CONGESTIVE) HEART FAILURE (HCC): ICD-10-CM

## 2024-12-23 DIAGNOSIS — K21.9 GASTRO-ESOPHAGEAL REFLUX DISEASE WITHOUT ESOPHAGITIS: ICD-10-CM

## 2024-12-23 DIAGNOSIS — I10 ESSENTIAL (PRIMARY) HYPERTENSION: ICD-10-CM

## 2024-12-23 DIAGNOSIS — J43.9 PULMONARY EMPHYSEMA, UNSPECIFIED EMPHYSEMA TYPE (HCC): ICD-10-CM

## 2024-12-23 DIAGNOSIS — Z99.11 DEPENDENCE ON RESPIRATOR (VENTILATOR) STATUS (HCC): ICD-10-CM

## 2024-12-23 DIAGNOSIS — I25.10 ATHEROSCLEROSIS OF NATIVE CORONARY ARTERY WITHOUT ANGINA PECTORIS, UNSPECIFIED WHETHER NATIVE OR TRANSPLANTED HEART: ICD-10-CM

## 2024-12-23 DIAGNOSIS — J44.1 CHRONIC OBSTRUCTIVE PULMONARY DISEASE WITH (ACUTE) EXACERBATION (HCC): ICD-10-CM

## 2025-01-02 ASSESSMENT — ENCOUNTER SYMPTOMS
DIARRHEA: 0
ABDOMINAL DISTENTION: 0
VOMITING: 0
BLOOD IN STOOL: 0
WHEEZING: 0
ABDOMINAL PAIN: 0
APNEA: 0
CONSTIPATION: 0
TROUBLE SWALLOWING: 0
PHOTOPHOBIA: 0
EYE ITCHING: 0
SORE THROAT: 0
SINUS PAIN: 0
EYE DISCHARGE: 0
COUGH: 0
BACK PAIN: 0
DIFFICULTY BREATHING: 1
SHORTNESS OF BREATH: 1
NAUSEA: 0
BLURRED VISION: 0
CHEST TIGHTNESS: 0

## 2025-01-02 ASSESSMENT — COPD QUESTIONNAIRES: COPD: 1

## 2025-01-02 NOTE — PROGRESS NOTES
Visit Date: 12/23/2024  Compa Obrien  1945  female 79 y.o.      Subjective:    CC: Patient presents with ARF. Patient presents for follow up of HTN, Afib, CAD, HLD, and GERD.    Hypertension  This is a chronic problem. The current episode started more than 1 year ago. The problem has been gradually improving since onset. The problem is controlled. Associated symptoms include malaise/fatigue and shortness of breath. Pertinent negatives include no blurred vision, chest pain, headaches, neck pain or palpitations. There are no associated agents to hypertension. Risk factors for coronary artery disease include post-menopausal state. Past treatments include beta blockers. The current treatment provides mild improvement. Compliance problems include exercise, diet and psychosocial issues.  Hypertensive end-organ damage includes CAD/MI and heart failure. There is no history of angina.   Coronary Artery Disease  Presents for follow-up visit. Symptoms include dizziness, muscle weakness and shortness of breath. Pertinent negatives include no chest pain, chest tightness, leg swelling or palpitations. The symptoms have been worsening. Compliance with diet is poor. Compliance with exercise is poor. Compliance with medications is poor.   COPD  She complains of difficulty breathing and shortness of breath. There is no chest tightness, cough or wheezing. This is a chronic problem. The current episode started more than 1 year ago. The problem occurs constantly. The problem has been waxing and waning. Associated symptoms include malaise/fatigue. Pertinent negatives include no appetite change, chest pain, ear pain, fever, headaches, myalgias, sore throat or trouble swallowing. Her symptoms are aggravated by nothing. Her symptoms are alleviated by ipratropium and steroid inhaler. She reports minimal improvement on treatment. Her past medical history is significant for COPD and emphysema.       Review of Systems

## 2025-01-04 ENCOUNTER — OUTSIDE SERVICES (OUTPATIENT)
Dept: PRIMARY CARE CLINIC | Age: 80
End: 2025-01-04

## 2025-01-04 DIAGNOSIS — I48.0 PAROXYSMAL ATRIAL FIBRILLATION (HCC): ICD-10-CM

## 2025-01-04 DIAGNOSIS — I10 ESSENTIAL (PRIMARY) HYPERTENSION: ICD-10-CM

## 2025-01-04 DIAGNOSIS — I25.10 ATHEROSCLEROSIS OF NATIVE CORONARY ARTERY WITHOUT ANGINA PECTORIS, UNSPECIFIED WHETHER NATIVE OR TRANSPLANTED HEART: ICD-10-CM

## 2025-01-04 DIAGNOSIS — Z99.11 DEPENDENCE ON RESPIRATOR (VENTILATOR) STATUS (HCC): ICD-10-CM

## 2025-01-04 DIAGNOSIS — K21.9 GASTRO-ESOPHAGEAL REFLUX DISEASE WITHOUT ESOPHAGITIS: ICD-10-CM

## 2025-01-04 DIAGNOSIS — J96.22 ACUTE AND CHRONIC RESPIRATORY FAILURE WITH HYPERCAPNIA: Primary | ICD-10-CM

## 2025-01-04 DIAGNOSIS — E78.2 MIXED HYPERLIPIDEMIA: ICD-10-CM

## 2025-01-04 DIAGNOSIS — J44.1 CHRONIC OBSTRUCTIVE PULMONARY DISEASE WITH (ACUTE) EXACERBATION (HCC): ICD-10-CM

## 2025-01-04 DIAGNOSIS — I50.32 CHRONIC DIASTOLIC (CONGESTIVE) HEART FAILURE (HCC): ICD-10-CM

## 2025-01-04 DIAGNOSIS — J43.9 PULMONARY EMPHYSEMA, UNSPECIFIED EMPHYSEMA TYPE (HCC): ICD-10-CM

## 2025-01-07 ASSESSMENT — ENCOUNTER SYMPTOMS
VOMITING: 0
SHORTNESS OF BREATH: 1
NAUSEA: 0
PHOTOPHOBIA: 0
DIFFICULTY BREATHING: 1
BLURRED VISION: 0
APNEA: 0
EYE DISCHARGE: 0
SINUS PAIN: 0
BLOOD IN STOOL: 0
BACK PAIN: 0
ABDOMINAL PAIN: 0
DIARRHEA: 0
ABDOMINAL DISTENTION: 0
CHEST TIGHTNESS: 0
CONSTIPATION: 0
SORE THROAT: 0
TROUBLE SWALLOWING: 0
EYE ITCHING: 0
COUGH: 0
WHEEZING: 0

## 2025-01-07 ASSESSMENT — COPD QUESTIONNAIRES: COPD: 1

## 2025-01-07 NOTE — PROGRESS NOTES
nursing note reviewed.   Constitutional:       General: She is not in acute distress.     Appearance: Normal appearance. She is normal weight.   HENT:      Head: Normocephalic and atraumatic.      Right Ear: Tympanic membrane, ear canal and external ear normal.      Left Ear: Tympanic membrane, ear canal and external ear normal. There is no impacted cerumen.      Nose: Nose normal. No congestion or rhinorrhea.      Mouth/Throat:      Mouth: Mucous membranes are moist.      Pharynx: Oropharynx is clear. No oropharyngeal exudate or posterior oropharyngeal erythema.   Eyes:      General: No scleral icterus.        Right eye: No discharge.         Left eye: No discharge.      Pupils: Pupils are equal, round, and reactive to light.   Neck:      Vascular: No carotid bruit.   Cardiovascular:      Rate and Rhythm: Normal rate and regular rhythm.      Pulses: Normal pulses.      Heart sounds: No murmur heard.     No gallop.   Pulmonary:      Effort: Pulmonary effort is normal. No respiratory distress.      Breath sounds: Normal breath sounds. No wheezing, rhonchi or rales.   Chest:      Chest wall: No tenderness.   Abdominal:      General: Bowel sounds are normal.      Palpations: Abdomen is soft. There is no mass.      Tenderness: There is no abdominal tenderness. There is no guarding.      Hernia: No hernia is present.   Musculoskeletal:         General: No swelling, tenderness, deformity or signs of injury.      Cervical back: Normal range of motion and neck supple. No rigidity or tenderness.      Right lower leg: No edema.      Left lower leg: No edema.   Lymphadenopathy:      Cervical: No cervical adenopathy.   Skin:     General: Skin is warm and dry.      Capillary Refill: Capillary refill takes 2 to 3 seconds.      Findings: No bruising, lesion or rash.   Neurological:      General: No focal deficit present.      Mental Status: She is alert and oriented to person, place, and time.      Sensory: No sensory deficit.

## 2025-02-07 ENCOUNTER — TELEPHONE (OUTPATIENT)
Dept: FAMILY MEDICINE CLINIC | Age: 80
End: 2025-02-07

## 2025-02-07 DIAGNOSIS — Z86.73 H/O: CVA (CEREBROVASCULAR ACCIDENT): Primary | ICD-10-CM

## 2025-02-07 DIAGNOSIS — I63.9 CEREBROVASCULAR ACCIDENT (CVA), UNSPECIFIED MECHANISM (HCC): ICD-10-CM

## 2025-02-25 ENCOUNTER — HOSPITAL ENCOUNTER (OUTPATIENT)
Age: 80
Discharge: HOME | End: 2025-02-25
Payer: COMMERCIAL

## 2025-02-25 ENCOUNTER — OFFICE VISIT (OUTPATIENT)
Dept: FAMILY MEDICINE CLINIC | Age: 80
End: 2025-02-25
Payer: COMMERCIAL

## 2025-02-25 VITALS
HEART RATE: 52 BPM | RESPIRATION RATE: 16 BRPM | HEIGHT: 58 IN | SYSTOLIC BLOOD PRESSURE: 106 MMHG | BODY MASS INDEX: 23.93 KG/M2 | TEMPERATURE: 97.2 F | WEIGHT: 114 LBS | DIASTOLIC BLOOD PRESSURE: 68 MMHG

## 2025-02-25 DIAGNOSIS — I50.22 CHRONIC SYSTOLIC CONGESTIVE HEART FAILURE (HCC): ICD-10-CM

## 2025-02-25 DIAGNOSIS — R53.83 OTHER FATIGUE: ICD-10-CM

## 2025-02-25 DIAGNOSIS — J43.2 CENTRILOBULAR EMPHYSEMA (HCC): Primary | ICD-10-CM

## 2025-02-25 DIAGNOSIS — I71.43 INFRARENAL ABDOMINAL AORTIC ANEURYSM (AAA) WITHOUT RUPTURE: ICD-10-CM

## 2025-02-25 DIAGNOSIS — I10 PRIMARY HYPERTENSION: ICD-10-CM

## 2025-02-25 DIAGNOSIS — R73.03 PREDIABETES: ICD-10-CM

## 2025-02-25 DIAGNOSIS — E78.5 DYSLIPIDEMIA: ICD-10-CM

## 2025-02-25 DIAGNOSIS — J43.2 CENTRILOBULAR EMPHYSEMA (HCC): ICD-10-CM

## 2025-02-25 DIAGNOSIS — Z95.5 HISTORY OF CORONARY ANGIOPLASTY WITH INSERTION OF STENT: ICD-10-CM

## 2025-02-25 PROBLEM — S02.30XA: Status: RESOLVED | Noted: 2024-11-11 | Resolved: 2025-02-25

## 2025-02-25 PROBLEM — S02.30XA ORBITAL FLOOR (BLOW-OUT) CLOSED FRACTURE (HCC): Status: RESOLVED | Noted: 2024-11-11 | Resolved: 2025-02-25

## 2025-02-25 LAB
ALBUMIN SERPL-MCNC: 3.2 G/DL (ref 3.5–5.2)
ALP SERPL-CCNC: 120 U/L (ref 35–104)
ALT SERPL-CCNC: 5 U/L (ref 0–32)
ANION GAP SERPL CALCULATED.3IONS-SCNC: 8 MMOL/L (ref 7–16)
AST SERPL-CCNC: 14 U/L (ref 0–31)
BASOPHILS # BLD: 0.03 K/UL (ref 0–0.2)
BASOPHILS NFR BLD: 1 % (ref 0–2)
BILIRUB SERPL-MCNC: 0.2 MG/DL (ref 0–1.2)
BUN SERPL-MCNC: 14 MG/DL (ref 6–23)
CALCIUM SERPL-MCNC: 9.4 MG/DL (ref 8.6–10.2)
CHLORIDE SERPL-SCNC: 91 MMOL/L (ref 98–107)
CHOLEST SERPL-MCNC: 166 MG/DL
CO2 SERPL-SCNC: 41 MMOL/L (ref 22–29)
CREAT SERPL-MCNC: 0.9 MG/DL (ref 0.5–1)
EOSINOPHIL # BLD: 0.09 K/UL (ref 0.05–0.5)
EOSINOPHILS RELATIVE PERCENT: 2 % (ref 0–6)
ERYTHROCYTE [DISTWIDTH] IN BLOOD BY AUTOMATED COUNT: 14.6 % (ref 11.5–15)
GFR, ESTIMATED: 69 ML/MIN/1.73M2
GLUCOSE SERPL-MCNC: 101 MG/DL (ref 74–99)
HBA1C MFR BLD: 5.3 % (ref 4–5.6)
HCT VFR BLD AUTO: 33.1 % (ref 34–48)
HDLC SERPL-MCNC: 73 MG/DL
HGB BLD-MCNC: 9.8 G/DL (ref 11.5–15.5)
IMM GRANULOCYTES # BLD AUTO: <0.03 K/UL (ref 0–0.58)
IMM GRANULOCYTES NFR BLD: 0 % (ref 0–5)
LDLC SERPL CALC-MCNC: 66 MG/DL
LYMPHOCYTES NFR BLD: 0.97 K/UL (ref 1.5–4)
LYMPHOCYTES RELATIVE PERCENT: 17 % (ref 20–42)
MCH RBC QN AUTO: 28 PG (ref 26–35)
MCHC RBC AUTO-ENTMCNC: 29.6 G/DL (ref 32–34.5)
MCV RBC AUTO: 94.6 FL (ref 80–99.9)
MONOCYTES NFR BLD: 0.68 K/UL (ref 0.1–0.95)
MONOCYTES NFR BLD: 12 % (ref 2–12)
NEUTROPHILS NFR BLD: 69 % (ref 43–80)
NEUTS SEG NFR BLD: 3.96 K/UL (ref 1.8–7.3)
PLATELET # BLD AUTO: 302 K/UL (ref 130–450)
PMV BLD AUTO: 10.5 FL (ref 7–12)
POTASSIUM SERPL-SCNC: 4.7 MMOL/L (ref 3.5–5)
PROT SERPL-MCNC: 6.5 G/DL (ref 6.4–8.3)
RBC # BLD AUTO: 3.5 M/UL (ref 3.5–5.5)
SODIUM SERPL-SCNC: 140 MMOL/L (ref 132–146)
TRIGL SERPL-MCNC: 134 MG/DL
TSH SERPL DL<=0.05 MIU/L-ACNC: 1.18 UIU/ML (ref 0.27–4.2)
URATE SERPL-MCNC: 6.8 MG/DL (ref 2.4–5.7)
VLDLC SERPL CALC-MCNC: 27 MG/DL
WBC OTHER # BLD: 5.8 K/UL (ref 4.5–11.5)

## 2025-02-25 PROCEDURE — 3078F DIAST BP <80 MM HG: CPT | Performed by: FAMILY MEDICINE

## 2025-02-25 PROCEDURE — 80053 COMPREHEN METABOLIC PANEL: CPT

## 2025-02-25 PROCEDURE — 36415 COLL VENOUS BLD VENIPUNCTURE: CPT

## 2025-02-25 PROCEDURE — 99215 OFFICE O/P EST HI 40 MIN: CPT | Performed by: FAMILY MEDICINE

## 2025-02-25 PROCEDURE — 1159F MED LIST DOCD IN RCRD: CPT | Performed by: FAMILY MEDICINE

## 2025-02-25 PROCEDURE — 3074F SYST BP LT 130 MM HG: CPT | Performed by: FAMILY MEDICINE

## 2025-02-25 PROCEDURE — 84550 ASSAY OF BLOOD/URIC ACID: CPT

## 2025-02-25 PROCEDURE — 1126F AMNT PAIN NOTED NONE PRSNT: CPT | Performed by: FAMILY MEDICINE

## 2025-02-25 PROCEDURE — 85025 COMPLETE CBC W/AUTO DIFF WBC: CPT

## 2025-02-25 PROCEDURE — 1160F RVW MEDS BY RX/DR IN RCRD: CPT | Performed by: FAMILY MEDICINE

## 2025-02-25 PROCEDURE — 84443 ASSAY THYROID STIM HORMONE: CPT

## 2025-02-25 PROCEDURE — 80061 LIPID PANEL: CPT

## 2025-02-25 PROCEDURE — 1123F ACP DISCUSS/DSCN MKR DOCD: CPT | Performed by: FAMILY MEDICINE

## 2025-02-25 PROCEDURE — 83036 HEMOGLOBIN GLYCOSYLATED A1C: CPT

## 2025-02-25 ASSESSMENT — ENCOUNTER SYMPTOMS
DIARRHEA: 0
EYE REDNESS: 0
BACK PAIN: 0
ORTHOPNEA: 0
SORE THROAT: 0
ALLERGIC/IMMUNOLOGIC NEGATIVE: 1
TROUBLE SWALLOWING: 0
STRIDOR: 0
EYE ITCHING: 0
FACIAL SWELLING: 0
RECTAL PAIN: 0
SHORTNESS OF BREATH: 1
CHOKING: 0
ABDOMINAL PAIN: 0
RHINORRHEA: 0
COLOR CHANGE: 0
GASTROINTESTINAL NEGATIVE: 1
SINUS PRESSURE: 0
BLOOD IN STOOL: 0
VOICE CHANGE: 0
CONSTIPATION: 0
PHOTOPHOBIA: 0
SINUS PAIN: 0
EYE PAIN: 0
CHEST TIGHTNESS: 0
EYE DISCHARGE: 0
BLURRED VISION: 0
COUGH: 0
WHEEZING: 0
ANAL BLEEDING: 0
APNEA: 0
VOMITING: 0
NAUSEA: 0
ABDOMINAL DISTENTION: 0

## 2025-02-25 ASSESSMENT — PATIENT HEALTH QUESTIONNAIRE - PHQ9
SUM OF ALL RESPONSES TO PHQ9 QUESTIONS 1 & 2: 0
SUM OF ALL RESPONSES TO PHQ QUESTIONS 1-9: 0
2. FEELING DOWN, DEPRESSED OR HOPELESS: NOT AT ALL
1. LITTLE INTEREST OR PLEASURE IN DOING THINGS: NOT AT ALL

## 2025-02-25 NOTE — PROGRESS NOTES
SUBJECTIVE  Compa Obrien is a 79 y.o. female.    HPI/Chief C/O:  Chief Complaint   Patient presents with    3 Month Follow-Up    Extremity Weakness     Pt states after therapy she was unable to walk     Discuss Medications     Pt would like to discuss Carafate in liquid form      No Known Allergies  The patient is here for a medication list and treatment planning review  We will go over our care planning goals as well as take care of all refills  We will set up labs as well      She is very weak and can not stand up as she gets dizzy     Hypertension  This is a chronic problem. The current episode started more than 1 year ago. The problem is controlled. Associated symptoms include malaise/fatigue and shortness of breath. Pertinent negatives include no anxiety, blurred vision, chest pain, headaches, neck pain, orthopnea, palpitations, peripheral edema, PND or sweats. Risk factors for coronary artery disease include post-menopausal state, smoking/tobacco exposure and dyslipidemia. Past treatments include lifestyle changes, beta blockers and diuretics. The current treatment provides significant improvement. Compliance problems include diet and exercise.  Hypertensive end-organ damage includes CAD/MI (stent x 1), CVA and heart failure. There is no history of angina, kidney disease, left ventricular hypertrophy, PVD or retinopathy. There is no history of chronic renal disease, coarctation of the aorta, hyperaldosteronism, hypercortisolism, hyperparathyroidism, a hypertension causing med, pheochromocytoma, renovascular disease, sleep apnea or a thyroid problem.       ROS:  Review of Systems   Constitutional:  Positive for fatigue and malaise/fatigue. Negative for activity change, appetite change, chills, diaphoresis, fever and unexpected weight change.   HENT: Negative.  Negative for congestion, dental problem, drooling, ear discharge, ear pain, facial swelling, hearing loss, mouth sores, nosebleeds, postnasal

## 2025-02-26 ENCOUNTER — TELEPHONE (OUTPATIENT)
Dept: FAMILY MEDICINE CLINIC | Age: 80
End: 2025-02-26

## 2025-02-26 DIAGNOSIS — J44.1 COPD EXACERBATION (HCC): Primary | ICD-10-CM

## 2025-02-26 RX ORDER — AZITHROMYCIN 250 MG/1
250 TABLET, FILM COATED ORAL DAILY
Qty: 30 TABLET | Refills: 1 | Status: SHIPPED | OUTPATIENT
Start: 2025-02-26 | End: 2025-03-28

## 2025-02-26 RX ORDER — PREDNISONE 20 MG/1
20 TABLET ORAL 2 TIMES DAILY
Qty: 60 TABLET | Refills: 1 | Status: SHIPPED | OUTPATIENT
Start: 2025-02-26

## 2025-02-26 NOTE — TELEPHONE ENCOUNTER
----- Message from Dr. Nigel Alvarado, DO sent at 2/26/2025  7:42 AM EST -----  She is COPD exacerbation  I have sent some additional medications in for her to take  If she gets worse, go right to ER

## 2025-03-04 ENCOUNTER — TELEPHONE (OUTPATIENT)
Dept: FAMILY MEDICINE CLINIC | Age: 80
End: 2025-03-04

## 2025-03-05 NOTE — TELEPHONE ENCOUNTER
Uric acid is fine  This is where her hemoglobin has been for a long time and shows no acute drop  When was her last colonoscopy

## 2025-03-05 NOTE — TELEPHONE ENCOUNTER
Spoke with patient's daughter and states that her last colonoscopy was over 10 years ago, states there is no blood in her stool. She does not smoke. Daughter states that patient is scared to walk and just stays in bed all day. She will get up to use the restroom but states that she gets dizzy when she gets up and her legs are swelling again.

## 2025-03-05 NOTE — TELEPHONE ENCOUNTER
Spoke with daughter and advised. She will try to get the patient to go to ER, but states that likely she will refuse

## 2025-03-15 ENCOUNTER — APPOINTMENT (OUTPATIENT)
Dept: CT IMAGING | Age: 80
DRG: 193 | End: 2025-03-15
Payer: COMMERCIAL

## 2025-03-15 ENCOUNTER — APPOINTMENT (OUTPATIENT)
Dept: GENERAL RADIOLOGY | Age: 80
DRG: 193 | End: 2025-03-15
Payer: COMMERCIAL

## 2025-03-15 ENCOUNTER — HOSPITAL ENCOUNTER (INPATIENT)
Age: 80
LOS: 8 days | DRG: 193 | End: 2025-03-23
Attending: EMERGENCY MEDICINE | Admitting: INTERNAL MEDICINE
Payer: COMMERCIAL

## 2025-03-15 DIAGNOSIS — J96.21 ACUTE ON CHRONIC RESPIRATORY FAILURE WITH HYPOXIA AND HYPERCAPNIA: Primary | ICD-10-CM

## 2025-03-15 DIAGNOSIS — J18.9 PNEUMONIA OF RIGHT LOWER LOBE DUE TO INFECTIOUS ORGANISM: ICD-10-CM

## 2025-03-15 DIAGNOSIS — J96.22 ACUTE ON CHRONIC RESPIRATORY FAILURE WITH HYPOXIA AND HYPERCAPNIA: Primary | ICD-10-CM

## 2025-03-15 DIAGNOSIS — N28.89 RENAL MASS: ICD-10-CM

## 2025-03-15 LAB
AADO2: 553.7 MMHG
ALBUMIN SERPL-MCNC: 4.1 G/DL (ref 3.5–5.2)
ALP SERPL-CCNC: 104 U/L (ref 35–104)
ALT SERPL-CCNC: 9 U/L (ref 0–32)
ANION GAP SERPL CALCULATED.3IONS-SCNC: 11 MMOL/L (ref 7–16)
AST SERPL-CCNC: 11 U/L (ref 0–31)
B.E.: 14.3 MMOL/L (ref -3–3)
B.E.: 16.2 MMOL/L (ref -3–3)
BASOPHILS # BLD: 0.01 K/UL (ref 0–0.2)
BASOPHILS NFR BLD: 0 % (ref 0–2)
BILIRUB SERPL-MCNC: 0.6 MG/DL (ref 0–1.2)
BNP SERPL-MCNC: 592 PG/ML (ref 0–450)
BUN SERPL-MCNC: 23 MG/DL (ref 6–23)
CALCIUM SERPL-MCNC: 9.8 MG/DL (ref 8.6–10.2)
CHLORIDE SERPL-SCNC: 87 MMOL/L (ref 98–107)
CO2 SERPL-SCNC: 41 MMOL/L (ref 22–29)
COHB: 0.4 % (ref 0–1.5)
COHB: 0.5 % (ref 0–1.5)
COMMENT: ABNORMAL
CREAT SERPL-MCNC: 0.8 MG/DL (ref 0.5–1)
CRITICAL: ABNORMAL
CRITICAL: ABNORMAL
DATE ANALYZED: ABNORMAL
DATE ANALYZED: ABNORMAL
DATE OF COLLECTION: ABNORMAL
DATE OF COLLECTION: ABNORMAL
EOSINOPHIL # BLD: 0 K/UL (ref 0.05–0.5)
EOSINOPHILS RELATIVE PERCENT: 0 % (ref 0–6)
ERYTHROCYTE [DISTWIDTH] IN BLOOD BY AUTOMATED COUNT: 14.7 % (ref 11.5–15)
FIO2: 100 %
FLOW RATE: 10
GFR, ESTIMATED: 74 ML/MIN/1.73M2
GLUCOSE BLD-MCNC: 134 MG/DL (ref 74–99)
GLUCOSE SERPL-MCNC: 150 MG/DL (ref 74–99)
HCO3: 41.6 MMOL/L (ref 22–26)
HCO3: 44.3 MMOL/L (ref 22–26)
HCT VFR BLD AUTO: 43.9 % (ref 34–48)
HGB BLD-MCNC: 13.3 G/DL (ref 11.5–15.5)
HHB: 8.4 % (ref 0–5)
HHB: 8.5 % (ref 0–5)
LAB: ABNORMAL
LAB: ABNORMAL
LYMPHOCYTES NFR BLD: 0.22 K/UL (ref 1.5–4)
LYMPHOCYTES RELATIVE PERCENT: 2 % (ref 20–42)
Lab: 2038
Lab: 2251
MCH RBC QN AUTO: 29.2 PG (ref 26–35)
MCHC RBC AUTO-ENTMCNC: 30.3 G/DL (ref 32–34.5)
MCV RBC AUTO: 96.3 FL (ref 80–99.9)
METHB: 0.1 % (ref 0–1.5)
METHB: 0.3 % (ref 0–1.5)
MODE: ABNORMAL
MODE: ABNORMAL
MONOCYTES NFR BLD: 0.64 K/UL (ref 0.1–0.95)
MONOCYTES NFR BLD: 4 % (ref 2–12)
NEUTROPHILS NFR BLD: 94 % (ref 43–80)
NEUTS SEG NFR BLD: 13.81 K/UL (ref 1.8–7.3)
O2 CONTENT: 16.1 ML/DL
O2 CONTENT: 16.5 ML/DL
O2 DELIVERY DEVICE: ABNORMAL
O2 SATURATION: 91.4 % (ref 92–98.5)
O2 SATURATION: 91.5 % (ref 92–98.5)
O2HB: 90.9 % (ref 94–97)
O2HB: 90.9 % (ref 94–97)
OPERATOR ID: 1110
OPERATOR ID: 2525
PATIENT TEMP: 37 C
PATIENT TEMP: 37 C
PCO2: 65.2 MMHG (ref 35–45)
PCO2: 72.1 MMHG (ref 35–45)
PFO2: 0.62 MMHG/%
PH BLOOD GAS: 7.41 (ref 7.35–7.45)
PH BLOOD GAS: 7.42 (ref 7.35–7.45)
PLATELET # BLD AUTO: 410 K/UL (ref 130–450)
PMV BLD AUTO: 9.8 FL (ref 7–12)
PO2: 61.1 MMHG (ref 75–100)
PO2: 62.2 MMHG (ref 75–100)
POC HCO3: 41.5 MMOL/L (ref 22–26)
POC O2 SATURATION: 89.3 % (ref 92–98.5)
POC PCO2: 60.2 MM HG (ref 35–45)
POC PH: 7.45 (ref 7.35–7.45)
POC PO2: 57 MM HG (ref 60–80)
POSITIVE BASE EXCESS, ART: 14.1 MMOL/L (ref 0–3)
POTASSIUM SERPL-SCNC: 3.4 MMOL/L (ref 3.5–5)
PROT SERPL-MCNC: 7.3 G/DL (ref 6.4–8.3)
RBC # BLD AUTO: 4.56 M/UL (ref 3.5–5.5)
RBC # BLD: ABNORMAL 10*6/UL
RBC # BLD: ABNORMAL 10*6/UL
RI(T): 8.9
SAMPLE SITE: ABNORMAL
SODIUM SERPL-SCNC: 139 MMOL/L (ref 132–146)
SOURCE, BLOOD GAS: ABNORMAL
SOURCE, BLOOD GAS: ABNORMAL
THB: 12.6 G/DL (ref 11.5–16.5)
THB: 12.9 G/DL (ref 11.5–16.5)
TIME ANALYZED: 2038
TIME ANALYZED: 2300
TROPONIN I SERPL HS-MCNC: 26 NG/L (ref 0–9)
WBC OTHER # BLD: 14.8 K/UL (ref 4.5–11.5)

## 2025-03-15 PROCEDURE — 84484 ASSAY OF TROPONIN QUANT: CPT

## 2025-03-15 PROCEDURE — 96374 THER/PROPH/DIAG INJ IV PUSH: CPT

## 2025-03-15 PROCEDURE — 71045 X-RAY EXAM CHEST 1 VIEW: CPT

## 2025-03-15 PROCEDURE — 94640 AIRWAY INHALATION TREATMENT: CPT

## 2025-03-15 PROCEDURE — 6370000000 HC RX 637 (ALT 250 FOR IP): Performed by: EMERGENCY MEDICINE

## 2025-03-15 PROCEDURE — 71250 CT THORAX DX C-: CPT

## 2025-03-15 PROCEDURE — 82805 BLOOD GASES W/O2 SATURATION: CPT

## 2025-03-15 PROCEDURE — 2700000000 HC OXYGEN THERAPY PER DAY

## 2025-03-15 PROCEDURE — 82962 GLUCOSE BLOOD TEST: CPT

## 2025-03-15 PROCEDURE — 6360000002 HC RX W HCPCS: Performed by: INTERNAL MEDICINE

## 2025-03-15 PROCEDURE — 93005 ELECTROCARDIOGRAM TRACING: CPT | Performed by: EMERGENCY MEDICINE

## 2025-03-15 PROCEDURE — 6370000000 HC RX 637 (ALT 250 FOR IP): Performed by: INTERNAL MEDICINE

## 2025-03-15 PROCEDURE — 94761 N-INVAS EAR/PLS OXIMETRY MLT: CPT

## 2025-03-15 PROCEDURE — 99285 EMERGENCY DEPT VISIT HI MDM: CPT

## 2025-03-15 PROCEDURE — 82803 BLOOD GASES ANY COMBINATION: CPT

## 2025-03-15 PROCEDURE — 2500000003 HC RX 250 WO HCPCS: Performed by: EMERGENCY MEDICINE

## 2025-03-15 PROCEDURE — 83880 ASSAY OF NATRIURETIC PEPTIDE: CPT

## 2025-03-15 PROCEDURE — 2060000000 HC ICU INTERMEDIATE R&B

## 2025-03-15 PROCEDURE — 80053 COMPREHEN METABOLIC PANEL: CPT

## 2025-03-15 PROCEDURE — 5A0955A ASSISTANCE WITH RESPIRATORY VENTILATION, GREATER THAN 96 CONSECUTIVE HOURS, HIGH NASAL FLOW/VELOCITY: ICD-10-PCS | Performed by: INTERNAL MEDICINE

## 2025-03-15 PROCEDURE — 85025 COMPLETE CBC W/AUTO DIFF WBC: CPT

## 2025-03-15 PROCEDURE — 96375 TX/PRO/DX INJ NEW DRUG ADDON: CPT

## 2025-03-15 PROCEDURE — 6360000002 HC RX W HCPCS: Performed by: EMERGENCY MEDICINE

## 2025-03-15 RX ORDER — DEXTROSE MONOHYDRATE 100 MG/ML
INJECTION, SOLUTION INTRAVENOUS CONTINUOUS PRN
Status: DISCONTINUED | OUTPATIENT
Start: 2025-03-15 | End: 2025-03-17

## 2025-03-15 RX ORDER — SODIUM CHLORIDE 0.9 % (FLUSH) 0.9 %
5-40 SYRINGE (ML) INJECTION PRN
Status: DISCONTINUED | OUTPATIENT
Start: 2025-03-15 | End: 2025-03-21

## 2025-03-15 RX ORDER — POLYETHYLENE GLYCOL 3350 17 G/17G
17 POWDER, FOR SOLUTION ORAL DAILY PRN
Status: DISCONTINUED | OUTPATIENT
Start: 2025-03-15 | End: 2025-03-21

## 2025-03-15 RX ORDER — GLUCAGON 1 MG/ML
1 KIT INJECTION PRN
Status: DISCONTINUED | OUTPATIENT
Start: 2025-03-15 | End: 2025-03-17

## 2025-03-15 RX ORDER — POTASSIUM CHLORIDE 7.45 MG/ML
10 INJECTION INTRAVENOUS PRN
Status: DISCONTINUED | OUTPATIENT
Start: 2025-03-15 | End: 2025-03-21

## 2025-03-15 RX ORDER — SODIUM CHLORIDE 0.9 % (FLUSH) 0.9 %
5-40 SYRINGE (ML) INJECTION EVERY 12 HOURS SCHEDULED
Status: DISCONTINUED | OUTPATIENT
Start: 2025-03-15 | End: 2025-03-21

## 2025-03-15 RX ORDER — FUROSEMIDE 20 MG/1
20 TABLET ORAL DAILY
Status: DISCONTINUED | OUTPATIENT
Start: 2025-03-15 | End: 2025-03-17

## 2025-03-15 RX ORDER — MAGNESIUM SULFATE IN WATER 40 MG/ML
2000 INJECTION, SOLUTION INTRAVENOUS PRN
Status: DISCONTINUED | OUTPATIENT
Start: 2025-03-15 | End: 2025-03-21

## 2025-03-15 RX ORDER — POTASSIUM CHLORIDE 1500 MG/1
40 TABLET, EXTENDED RELEASE ORAL PRN
Status: DISCONTINUED | OUTPATIENT
Start: 2025-03-15 | End: 2025-03-21

## 2025-03-15 RX ORDER — SODIUM CHLORIDE 9 MG/ML
INJECTION, SOLUTION INTRAVENOUS PRN
Status: DISCONTINUED | OUTPATIENT
Start: 2025-03-15 | End: 2025-03-21

## 2025-03-15 RX ORDER — IPRATROPIUM BROMIDE AND ALBUTEROL SULFATE 2.5; .5 MG/3ML; MG/3ML
1 SOLUTION RESPIRATORY (INHALATION)
Status: DISCONTINUED | OUTPATIENT
Start: 2025-03-15 | End: 2025-03-21

## 2025-03-15 RX ORDER — SUCRALFATE 1 G/1
1 TABLET ORAL 4 TIMES DAILY
Status: DISCONTINUED | OUTPATIENT
Start: 2025-03-15 | End: 2025-03-21

## 2025-03-15 RX ORDER — BUDESONIDE 0.25 MG/2ML
500 INHALANT ORAL 2 TIMES DAILY
Status: DISCONTINUED | OUTPATIENT
Start: 2025-03-15 | End: 2025-03-21

## 2025-03-15 RX ORDER — ACETAMINOPHEN 325 MG/1
650 TABLET ORAL EVERY 6 HOURS PRN
Status: DISCONTINUED | OUTPATIENT
Start: 2025-03-15 | End: 2025-03-21

## 2025-03-15 RX ORDER — PANTOPRAZOLE SODIUM 40 MG/1
40 TABLET, DELAYED RELEASE ORAL
Status: DISCONTINUED | OUTPATIENT
Start: 2025-03-16 | End: 2025-03-21

## 2025-03-15 RX ORDER — ALBUTEROL SULFATE 0.83 MG/ML
2.5 SOLUTION RESPIRATORY (INHALATION) 4 TIMES DAILY PRN
Status: DISCONTINUED | OUTPATIENT
Start: 2025-03-15 | End: 2025-03-17

## 2025-03-15 RX ORDER — METOPROLOL SUCCINATE 25 MG/1
50 TABLET, EXTENDED RELEASE ORAL DAILY
Status: DISCONTINUED | OUTPATIENT
Start: 2025-03-15 | End: 2025-03-16

## 2025-03-15 RX ORDER — CLOPIDOGREL BISULFATE 75 MG/1
75 TABLET ORAL DAILY
Status: DISCONTINUED | OUTPATIENT
Start: 2025-03-16 | End: 2025-03-21

## 2025-03-15 RX ORDER — ACETAMINOPHEN 650 MG/1
650 SUPPOSITORY RECTAL EVERY 6 HOURS PRN
Status: DISCONTINUED | OUTPATIENT
Start: 2025-03-15 | End: 2025-03-21

## 2025-03-15 RX ORDER — METOPROLOL SUCCINATE 25 MG/1
50 TABLET, EXTENDED RELEASE ORAL DAILY
Status: DISCONTINUED | OUTPATIENT
Start: 2025-03-15 | End: 2025-03-15

## 2025-03-15 RX ORDER — IPRATROPIUM BROMIDE AND ALBUTEROL SULFATE 2.5; .5 MG/3ML; MG/3ML
3 SOLUTION RESPIRATORY (INHALATION) ONCE
Status: COMPLETED | OUTPATIENT
Start: 2025-03-15 | End: 2025-03-15

## 2025-03-15 RX ORDER — MAGNESIUM SULFATE IN WATER 40 MG/ML
2000 INJECTION, SOLUTION INTRAVENOUS ONCE
Status: COMPLETED | OUTPATIENT
Start: 2025-03-15 | End: 2025-03-15

## 2025-03-15 RX ADMIN — WATER 40 MG: 1 INJECTION INTRAMUSCULAR; INTRAVENOUS; SUBCUTANEOUS at 16:55

## 2025-03-15 RX ADMIN — IPRATROPIUM BROMIDE AND ALBUTEROL SULFATE 1 DOSE: .5; 2.5 SOLUTION RESPIRATORY (INHALATION) at 20:13

## 2025-03-15 RX ADMIN — APIXABAN 5 MG: 5 TABLET, FILM COATED ORAL at 23:29

## 2025-03-15 RX ADMIN — IPRATROPIUM BROMIDE AND ALBUTEROL SULFATE 3 DOSE: .5; 3 SOLUTION RESPIRATORY (INHALATION) at 16:45

## 2025-03-15 RX ADMIN — BUDESONIDE 500 MCG: 0.25 SUSPENSION RESPIRATORY (INHALATION) at 20:14

## 2025-03-15 RX ADMIN — ALBUTEROL SULFATE 2.5 MG: 2.5 SOLUTION RESPIRATORY (INHALATION) at 21:08

## 2025-03-15 RX ADMIN — METOPROLOL SUCCINATE 50 MG: 25 TABLET, EXTENDED RELEASE ORAL at 23:27

## 2025-03-15 RX ADMIN — MAGNESIUM SULFATE HEPTAHYDRATE 2000 MG: 40 INJECTION, SOLUTION INTRAVENOUS at 16:59

## 2025-03-15 NOTE — ED PROVIDER NOTES
79-year-old female presenting in respiratory distress from home.  Has known COPD.  Presents on CPAP after having significant trouble breathing.  Upon arrival she had already had 1 DuoNeb treatment.  Remained on CPAP.  Normally wears 5 L nasal cannula at home.  Very tight breath sounds with low bit of wheezing present.  She is pursed lip breathing, accessory muscle usage         Family History   Problem Relation Age of Onset    High Blood Pressure Mother     Other Father     High Blood Pressure Father      Past Surgical History:   Procedure Laterality Date    TONSILLECTOMY         Review of Systems   Constitutional:  Negative for chills and fever.   Respiratory:  Positive for chest tightness and shortness of breath.         Physical Exam  Constitutional:       General: She is in acute distress.      Appearance: She is well-developed.   HENT:      Head: Normocephalic and atraumatic.   Eyes:      Conjunctiva/sclera: Conjunctivae normal.      Pupils: Pupils are equal, round, and reactive to light.   Neck:      Thyroid: No thyromegaly.   Cardiovascular:      Rate and Rhythm: Normal rate and regular rhythm.   Pulmonary:      Effort: Tachypnea, accessory muscle usage, prolonged expiration and respiratory distress present.      Breath sounds: Decreased air movement present. Wheezing present.   Abdominal:      General: There is no distension.      Palpations: Abdomen is soft.      Tenderness: There is no abdominal tenderness. There is no guarding or rebound.   Musculoskeletal:         General: No tenderness. Normal range of motion.      Cervical back: Normal range of motion.   Skin:     General: Skin is warm and dry.      Findings: No erythema.   Neurological:      Mental Status: She is alert and oriented to person, place, and time.      Cranial Nerves: No cranial nerve deficit.      Coordination: Coordination normal.          Procedures     MDM       History From: Patient    CC/HPI Summary, DDx, ED Course, Reassessment,

## 2025-03-15 NOTE — H&P
Department of Internal Medicine  History and Physical    PCP: Nigel Alvarado DO  Admitting Physician: Dr. Valenzuela/Black  Consultants:   Date of Service: 3/15/2025    CHIEF COMPLAINT:  sob    HISTORY OF PRESENT ILLNESS:    Patient is 79-year-old female presents to the ED with shortness of breath.  Patient has been feeling short of breath for the last day or 2.  She admits to cough and wheezing.  She denies any subjective fever or chills.        PAST MEDICAL Hx:  Past Medical History:   Diagnosis Date    Hypertension     Stroke (HCC)        PAST SURGICAL Hx:   Past Surgical History:   Procedure Laterality Date    TONSILLECTOMY         FAMILY Hx:  Family History   Problem Relation Age of Onset    High Blood Pressure Mother     Other Father     High Blood Pressure Father        HOME MEDICATIONS:  Prior to Admission medications    Medication Sig Start Date End Date Taking? Authorizing Provider   azithromycin (ZITHROMAX) 250 MG tablet Take 1 tablet by mouth daily 2/26/25 3/28/25  Nigel Alvarado DO   predniSONE (DELTASONE) 20 MG tablet Take 1 tablet by mouth 2 times daily 2/26/25   Nigel Alvarado DO   apixaban (ELIQUIS) 5 MG TABS tablet Take 1 tablet by mouth 2 times daily 11/15/24   Jennifer Shirley APRN - CNP   metoprolol succinate (TOPROL XL) 50 MG extended release tablet Take 1 tablet by mouth daily 11/16/24   Jennifer Shirley APRN - CNP   guaiFENesin 400 MG tablet Take 1 tablet by mouth 3 times daily 11/14/24   Anoop Hale APRN - CNP   albuterol (PROVENTIL) (2.5 MG/3ML) 0.083% nebulizer solution Take 3 mLs by nebulization 4 times daily as needed for Wheezing 11/14/24   Anoop Hale APRN - CNP   arformoterol tartrate (BROVANA) 15 MCG/2ML NEBU Take 2 mLs by nebulization in the morning and 2 mLs in the evening. 11/14/24   Anoop Hale APRN - CNP   albuterol sulfate HFA (PROVENTIL HFA) 108 (90 Base) MCG/ACT inhaler Inhale 2 puffs into the lungs every 4 hours as needed for

## 2025-03-16 ENCOUNTER — APPOINTMENT (OUTPATIENT)
Dept: CT IMAGING | Age: 80
DRG: 193 | End: 2025-03-16
Payer: COMMERCIAL

## 2025-03-16 LAB
AADO2: 86.7 MMHG
ALBUMIN SERPL-MCNC: 3.9 G/DL (ref 3.5–5.2)
ALP SERPL-CCNC: 99 U/L (ref 35–104)
ALT SERPL-CCNC: 8 U/L (ref 0–32)
ANION GAP SERPL CALCULATED.3IONS-SCNC: 13 MMOL/L (ref 7–16)
AST SERPL-CCNC: 13 U/L (ref 0–31)
B PARAP IS1001 DNA NPH QL NAA+NON-PROBE: NOT DETECTED
B PERT DNA SPEC QL NAA+PROBE: NOT DETECTED
B.E.: 16.3 MMOL/L (ref -3–3)
BASOPHILS # BLD: 0.01 K/UL (ref 0–0.2)
BASOPHILS NFR BLD: 0 % (ref 0–2)
BILIRUB SERPL-MCNC: 0.4 MG/DL (ref 0–1.2)
BUN SERPL-MCNC: 26 MG/DL (ref 6–23)
C PNEUM DNA NPH QL NAA+NON-PROBE: NOT DETECTED
CALCIUM SERPL-MCNC: 9.6 MG/DL (ref 8.6–10.2)
CHLORIDE SERPL-SCNC: 86 MMOL/L (ref 98–107)
CO2 SERPL-SCNC: 41 MMOL/L (ref 22–29)
COHB: 0 % (ref 0–1.5)
COMMENT: ABNORMAL
CREAT SERPL-MCNC: 0.8 MG/DL (ref 0.5–1)
CRITICAL: ABNORMAL
DATE ANALYZED: ABNORMAL
DATE OF COLLECTION: ABNORMAL
EKG ATRIAL RATE: 118 BPM
EKG P AXIS: 82 DEGREES
EKG P-R INTERVAL: 112 MS
EKG Q-T INTERVAL: 348 MS
EKG QRS DURATION: 62 MS
EKG QTC CALCULATION (BAZETT): 487 MS
EKG R AXIS: 60 DEGREES
EKG T AXIS: 67 DEGREES
EKG VENTRICULAR RATE: 118 BPM
EOSINOPHIL # BLD: 0 K/UL (ref 0.05–0.5)
EOSINOPHILS RELATIVE PERCENT: 0 % (ref 0–6)
ERYTHROCYTE [DISTWIDTH] IN BLOOD BY AUTOMATED COUNT: 14.9 % (ref 11.5–15)
FIO2: 50 %
FLUAV RNA NPH QL NAA+NON-PROBE: NOT DETECTED
FLUBV RNA NPH QL NAA+NON-PROBE: NOT DETECTED
GFR, ESTIMATED: 81 ML/MIN/1.73M2
GLUCOSE BLD-MCNC: 107 MG/DL (ref 74–99)
GLUCOSE BLD-MCNC: 108 MG/DL (ref 74–99)
GLUCOSE SERPL-MCNC: 129 MG/DL (ref 74–99)
HADV DNA NPH QL NAA+NON-PROBE: NOT DETECTED
HCO3: 44.2 MMOL/L (ref 22–26)
HCOV 229E RNA NPH QL NAA+NON-PROBE: NOT DETECTED
HCOV HKU1 RNA NPH QL NAA+NON-PROBE: NOT DETECTED
HCOV NL63 RNA NPH QL NAA+NON-PROBE: NOT DETECTED
HCOV OC43 RNA NPH QL NAA+NON-PROBE: NOT DETECTED
HCT VFR BLD AUTO: 39.6 % (ref 34–48)
HGB BLD-MCNC: 12.3 G/DL (ref 11.5–15.5)
HHB: 0.5 % (ref 0–5)
HMPV RNA NPH QL NAA+NON-PROBE: DETECTED
HPIV1 RNA NPH QL NAA+NON-PROBE: NOT DETECTED
HPIV2 RNA NPH QL NAA+NON-PROBE: NOT DETECTED
HPIV3 RNA NPH QL NAA+NON-PROBE: NOT DETECTED
HPIV4 RNA NPH QL NAA+NON-PROBE: NOT DETECTED
IMM GRANULOCYTES # BLD AUTO: 0.1 K/UL (ref 0–0.58)
IMM GRANULOCYTES NFR BLD: 1 % (ref 0–5)
LAB: ABNORMAL
LYMPHOCYTES NFR BLD: 0.15 K/UL (ref 1.5–4)
LYMPHOCYTES RELATIVE PERCENT: 1 % (ref 20–42)
Lab: 320
M PNEUMO DNA NPH QL NAA+NON-PROBE: NOT DETECTED
MAGNESIUM SERPL-MCNC: 2.7 MG/DL (ref 1.6–2.6)
MCH RBC QN AUTO: 29.1 PG (ref 26–35)
MCHC RBC AUTO-ENTMCNC: 31.1 G/DL (ref 32–34.5)
MCV RBC AUTO: 93.8 FL (ref 80–99.9)
METHB: 0.2 % (ref 0–1.5)
MODE: ABNORMAL
MONOCYTES NFR BLD: 0.8 K/UL (ref 0.1–0.95)
MONOCYTES NFR BLD: 5 % (ref 2–12)
NEUTROPHILS NFR BLD: 93 % (ref 43–80)
NEUTS SEG NFR BLD: 14.99 K/UL (ref 1.8–7.3)
O2 CONTENT: 16.6 ML/DL
O2 SATURATION: 99.5 % (ref 92–98.5)
O2HB: 99.3 % (ref 94–97)
OPERATOR ID: 1110
PATIENT TEMP: 37 C
PCO2: 73 MMHG (ref 35–45)
PFO2: 3.5 MMHG/%
PH BLOOD GAS: 7.4 (ref 7.35–7.45)
PHOSPHATE SERPL-MCNC: 4.1 MG/DL (ref 2.5–4.5)
PLATELET # BLD AUTO: 331 K/UL (ref 130–450)
PMV BLD AUTO: 10.4 FL (ref 7–12)
PO2: 175.2 MMHG (ref 75–100)
POTASSIUM SERPL-SCNC: 3.5 MMOL/L (ref 3.5–5)
PROT SERPL-MCNC: 7 G/DL (ref 6.4–8.3)
RBC # BLD AUTO: 4.22 M/UL (ref 3.5–5.5)
RBC # BLD: NORMAL 10*6/UL
RI(T): 0.49
RSV RNA NPH QL NAA+NON-PROBE: NOT DETECTED
RV+EV RNA NPH QL NAA+NON-PROBE: NOT DETECTED
SARS-COV-2 RNA NPH QL NAA+NON-PROBE: NOT DETECTED
SODIUM SERPL-SCNC: 140 MMOL/L (ref 132–146)
SOURCE, BLOOD GAS: ABNORMAL
SPECIMEN DESCRIPTION: ABNORMAL
T4 FREE SERPL-MCNC: 1.2 NG/DL (ref 0.9–1.7)
THB: 11.6 G/DL (ref 11.5–16.5)
TIME ANALYZED: 320
TSH SERPL DL<=0.05 MIU/L-ACNC: 0.2 UIU/ML (ref 0.27–4.2)
WBC OTHER # BLD: 16.1 K/UL (ref 4.5–11.5)

## 2025-03-16 PROCEDURE — 93010 ELECTROCARDIOGRAM REPORT: CPT | Performed by: INTERNAL MEDICINE

## 2025-03-16 PROCEDURE — 2500000003 HC RX 250 WO HCPCS: Performed by: INTERNAL MEDICINE

## 2025-03-16 PROCEDURE — 84100 ASSAY OF PHOSPHORUS: CPT

## 2025-03-16 PROCEDURE — 2580000003 HC RX 258

## 2025-03-16 PROCEDURE — 84145 PROCALCITONIN (PCT): CPT

## 2025-03-16 PROCEDURE — 2580000003 HC RX 258: Performed by: INTERNAL MEDICINE

## 2025-03-16 PROCEDURE — 6360000004 HC RX CONTRAST MEDICATION: Performed by: RADIOLOGY

## 2025-03-16 PROCEDURE — 6360000002 HC RX W HCPCS: Performed by: EMERGENCY MEDICINE

## 2025-03-16 PROCEDURE — 2060000000 HC ICU INTERMEDIATE R&B

## 2025-03-16 PROCEDURE — 6370000000 HC RX 637 (ALT 250 FOR IP): Performed by: INTERNAL MEDICINE

## 2025-03-16 PROCEDURE — 94660 CPAP INITIATION&MGMT: CPT

## 2025-03-16 PROCEDURE — 83735 ASSAY OF MAGNESIUM: CPT

## 2025-03-16 PROCEDURE — 2700000000 HC OXYGEN THERAPY PER DAY

## 2025-03-16 PROCEDURE — 87899 AGENT NOS ASSAY W/OPTIC: CPT

## 2025-03-16 PROCEDURE — 99223 1ST HOSP IP/OBS HIGH 75: CPT | Performed by: INTERNAL MEDICINE

## 2025-03-16 PROCEDURE — 80053 COMPREHEN METABOLIC PANEL: CPT

## 2025-03-16 PROCEDURE — 87449 NOS EACH ORGANISM AG IA: CPT

## 2025-03-16 PROCEDURE — 84439 ASSAY OF FREE THYROXINE: CPT

## 2025-03-16 PROCEDURE — 94640 AIRWAY INHALATION TREATMENT: CPT

## 2025-03-16 PROCEDURE — 85025 COMPLETE CBC W/AUTO DIFF WBC: CPT

## 2025-03-16 PROCEDURE — 6360000002 HC RX W HCPCS: Performed by: INTERNAL MEDICINE

## 2025-03-16 PROCEDURE — 0202U NFCT DS 22 TRGT SARS-COV-2: CPT

## 2025-03-16 PROCEDURE — 82805 BLOOD GASES W/O2 SATURATION: CPT

## 2025-03-16 PROCEDURE — 84443 ASSAY THYROID STIM HORMONE: CPT

## 2025-03-16 PROCEDURE — 2500000003 HC RX 250 WO HCPCS: Performed by: EMERGENCY MEDICINE

## 2025-03-16 PROCEDURE — 82962 GLUCOSE BLOOD TEST: CPT

## 2025-03-16 PROCEDURE — 74177 CT ABD & PELVIS W/CONTRAST: CPT

## 2025-03-16 PROCEDURE — 5A09557 ASSISTANCE WITH RESPIRATORY VENTILATION, GREATER THAN 96 CONSECUTIVE HOURS, CONTINUOUS POSITIVE AIRWAY PRESSURE: ICD-10-PCS | Performed by: INTERNAL MEDICINE

## 2025-03-16 RX ORDER — IOPAMIDOL 755 MG/ML
75 INJECTION, SOLUTION INTRAVASCULAR
Status: COMPLETED | OUTPATIENT
Start: 2025-03-16 | End: 2025-03-16

## 2025-03-16 RX ORDER — ACETAZOLAMIDE 250 MG/1
500 TABLET ORAL 2 TIMES DAILY
Status: DISCONTINUED | OUTPATIENT
Start: 2025-03-16 | End: 2025-03-17

## 2025-03-16 RX ORDER — METOPROLOL SUCCINATE 25 MG/1
12.5 TABLET, EXTENDED RELEASE ORAL DAILY
Status: DISCONTINUED | OUTPATIENT
Start: 2025-03-16 | End: 2025-03-20

## 2025-03-16 RX ORDER — SODIUM CHLORIDE 9 MG/ML
INJECTION, SOLUTION INTRAVENOUS CONTINUOUS
Status: DISCONTINUED | OUTPATIENT
Start: 2025-03-16 | End: 2025-03-17

## 2025-03-16 RX ADMIN — BUDESONIDE 500 MCG: 0.25 SUSPENSION RESPIRATORY (INHALATION) at 05:08

## 2025-03-16 RX ADMIN — Medication 10 ML: at 00:47

## 2025-03-16 RX ADMIN — APIXABAN 5 MG: 5 TABLET, FILM COATED ORAL at 08:38

## 2025-03-16 RX ADMIN — IPRATROPIUM BROMIDE AND ALBUTEROL SULFATE 1 DOSE: .5; 2.5 SOLUTION RESPIRATORY (INHALATION) at 01:39

## 2025-03-16 RX ADMIN — IOPAMIDOL 75 ML: 755 INJECTION, SOLUTION INTRAVENOUS at 09:06

## 2025-03-16 RX ADMIN — WATER 1000 MG: 1 INJECTION INTRAMUSCULAR; INTRAVENOUS; SUBCUTANEOUS at 00:20

## 2025-03-16 RX ADMIN — IPRATROPIUM BROMIDE AND ALBUTEROL SULFATE 1 DOSE: .5; 2.5 SOLUTION RESPIRATORY (INHALATION) at 21:44

## 2025-03-16 RX ADMIN — METHYLPREDNISOLONE SODIUM SUCCINATE 40 MG: 40 INJECTION, POWDER, LYOPHILIZED, FOR SOLUTION INTRAMUSCULAR; INTRAVENOUS at 02:54

## 2025-03-16 RX ADMIN — PANTOPRAZOLE SODIUM 40 MG: 40 TABLET, DELAYED RELEASE ORAL at 08:38

## 2025-03-16 RX ADMIN — IPRATROPIUM BROMIDE AND ALBUTEROL SULFATE 1 DOSE: .5; 2.5 SOLUTION RESPIRATORY (INHALATION) at 05:08

## 2025-03-16 RX ADMIN — DOXYCYCLINE 100 MG: 100 INJECTION, POWDER, LYOPHILIZED, FOR SOLUTION INTRAVENOUS at 13:33

## 2025-03-16 RX ADMIN — IPRATROPIUM BROMIDE AND ALBUTEROL SULFATE 1 DOSE: .5; 2.5 SOLUTION RESPIRATORY (INHALATION) at 13:20

## 2025-03-16 RX ADMIN — DOXYCYCLINE 100 MG: 100 INJECTION, POWDER, LYOPHILIZED, FOR SOLUTION INTRAVENOUS at 00:47

## 2025-03-16 RX ADMIN — METHYLPREDNISOLONE SODIUM SUCCINATE 40 MG: 40 INJECTION, POWDER, LYOPHILIZED, FOR SOLUTION INTRAMUSCULAR; INTRAVENOUS at 08:38

## 2025-03-16 RX ADMIN — IPRATROPIUM BROMIDE AND ALBUTEROL SULFATE 1 DOSE: .5; 2.5 SOLUTION RESPIRATORY (INHALATION) at 10:17

## 2025-03-16 RX ADMIN — SUCRALFATE 1 G: 1 TABLET ORAL at 08:38

## 2025-03-16 RX ADMIN — BUDESONIDE 500 MCG: 0.25 SUSPENSION RESPIRATORY (INHALATION) at 18:27

## 2025-03-16 RX ADMIN — METHYLPREDNISOLONE SODIUM SUCCINATE 40 MG: 40 INJECTION, POWDER, LYOPHILIZED, FOR SOLUTION INTRAMUSCULAR; INTRAVENOUS at 16:14

## 2025-03-16 RX ADMIN — AZITHROMYCIN DIHYDRATE 500 MG: 500 INJECTION, POWDER, LYOPHILIZED, FOR SOLUTION INTRAVENOUS at 14:54

## 2025-03-16 RX ADMIN — ACETAZOLAMIDE 500 MG: 250 TABLET ORAL at 20:48

## 2025-03-16 RX ADMIN — SUCRALFATE 1 G: 1 TABLET ORAL at 16:14

## 2025-03-16 RX ADMIN — APIXABAN 5 MG: 5 TABLET, FILM COATED ORAL at 20:48

## 2025-03-16 RX ADMIN — SUCRALFATE 1 G: 1 TABLET ORAL at 13:32

## 2025-03-16 RX ADMIN — IPRATROPIUM BROMIDE AND ALBUTEROL SULFATE 1 DOSE: .5; 2.5 SOLUTION RESPIRATORY (INHALATION) at 18:26

## 2025-03-16 RX ADMIN — CLOPIDOGREL BISULFATE 75 MG: 75 TABLET, FILM COATED ORAL at 08:38

## 2025-03-16 RX ADMIN — SODIUM CHLORIDE: 0.9 INJECTION, SOLUTION INTRAVENOUS at 08:41

## 2025-03-16 RX ADMIN — Medication 10 ML: at 20:50

## 2025-03-16 ASSESSMENT — LIFESTYLE VARIABLES
HOW MANY STANDARD DRINKS CONTAINING ALCOHOL DO YOU HAVE ON A TYPICAL DAY: 1 OR 2
HOW OFTEN DO YOU HAVE A DRINK CONTAINING ALCOHOL: 2-3 TIMES A WEEK

## 2025-03-16 ASSESSMENT — PAIN SCALES - GENERAL: PAINLEVEL_OUTOF10: 0

## 2025-03-16 NOTE — CONSULTS
Assessment and Plan  Patient is a 79 y.o. female with the following medical Problems:   Acute on chronic hypoxic and hypercapnic respiratory failure requiring BiPAP.  (O2 baseline 4-5 L, home BIPAP, but not compliant.)  Acute exacerbation of COPD.   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  Supplemental oxygen to keep sat 88 to 94%.  Pneumonia workup including Legionella urinary antigen, strep urinary antigen, mycoplasma IgM, procalcitonin level.  Respiratory viral panel came back positive for human metapneumovirus.  Follow-up CT scan of the chest in 4 to 6 weeks to ensure resolution.  Diamox 500 mg twice daily to help with hypercarbia.  Patient is chronically on Eliquis which covers for DVT prophylaxis.  BiPAP while sleeping, napping, and as needed.  BiPAP settings were adjusted to 15/5 with 14 backup rate  GI prophylaxis..    History of Present Illness:   Patient is a 79-year-old former smoker woman who presented to the emergency room with progressive shortness of breath and cough.  She was tachypneic upon presentation requiring BiPAP.  Symptoms improved over the last 24 hours.  Patient had mild leukocytosis and was started on ceftriaxone and doxycycline.  Antibiotics were changed to ceftriaxone and azithromycin.    Past Medical History:  Past Medical History:   Diagnosis Date    Hypertension     Stroke (HCC)       Past Surgical History:   Procedure Laterality Date

## 2025-03-17 LAB
ALBUMIN SERPL-MCNC: 3.4 G/DL (ref 3.5–5.2)
ALP SERPL-CCNC: 78 U/L (ref 35–104)
ALT SERPL-CCNC: 7 U/L (ref 0–32)
ANION GAP SERPL CALCULATED.3IONS-SCNC: 10 MMOL/L (ref 7–16)
AST SERPL-CCNC: 10 U/L (ref 0–31)
BASOPHILS # BLD: 0 K/UL (ref 0–0.2)
BASOPHILS NFR BLD: 0 % (ref 0–2)
BILIRUB SERPL-MCNC: 0.2 MG/DL (ref 0–1.2)
BUN SERPL-MCNC: 27 MG/DL (ref 6–23)
CALCIUM SERPL-MCNC: 9.2 MG/DL (ref 8.6–10.2)
CHLORIDE SERPL-SCNC: 91 MMOL/L (ref 98–107)
CO2 SERPL-SCNC: 35 MMOL/L (ref 22–29)
CREAT SERPL-MCNC: 0.7 MG/DL (ref 0.5–1)
EOSINOPHIL # BLD: 0 K/UL (ref 0.05–0.5)
EOSINOPHILS RELATIVE PERCENT: 0 % (ref 0–6)
ERYTHROCYTE [DISTWIDTH] IN BLOOD BY AUTOMATED COUNT: 15.2 % (ref 11.5–15)
GFR, ESTIMATED: 89 ML/MIN/1.73M2
GLUCOSE BLD-MCNC: 111 MG/DL (ref 74–99)
GLUCOSE BLD-MCNC: 118 MG/DL (ref 74–99)
GLUCOSE BLD-MCNC: 138 MG/DL (ref 74–99)
GLUCOSE SERPL-MCNC: 112 MG/DL (ref 74–99)
HCT VFR BLD AUTO: 36.8 % (ref 34–48)
HGB BLD-MCNC: 11 G/DL (ref 11.5–15.5)
IMM GRANULOCYTES # BLD AUTO: 0.08 K/UL (ref 0–0.58)
IMM GRANULOCYTES NFR BLD: 1 % (ref 0–5)
L PNEUMO1 AG UR QL IA.RAPID: NEGATIVE
LYMPHOCYTES NFR BLD: 0.15 K/UL (ref 1.5–4)
LYMPHOCYTES RELATIVE PERCENT: 1 % (ref 20–42)
MCH RBC QN AUTO: 28.9 PG (ref 26–35)
MCHC RBC AUTO-ENTMCNC: 29.9 G/DL (ref 32–34.5)
MCV RBC AUTO: 96.8 FL (ref 80–99.9)
MONOCYTES NFR BLD: 0.79 K/UL (ref 0.1–0.95)
MONOCYTES NFR BLD: 6 % (ref 2–12)
NEUTROPHILS NFR BLD: 92 % (ref 43–80)
NEUTS SEG NFR BLD: 11.59 K/UL (ref 1.8–7.3)
PLATELET # BLD AUTO: 321 K/UL (ref 130–450)
PMV BLD AUTO: 10.5 FL (ref 7–12)
POTASSIUM SERPL-SCNC: 3.2 MMOL/L (ref 3.5–5)
PROCALCITONIN SERPL-MCNC: 0.07 NG/ML (ref 0–0.08)
PROT SERPL-MCNC: 6.2 G/DL (ref 6.4–8.3)
RBC # BLD AUTO: 3.8 M/UL (ref 3.5–5.5)
RBC # BLD: ABNORMAL 10*6/UL
RBC # BLD: ABNORMAL 10*6/UL
S PNEUM AG SPEC QL: NEGATIVE
SODIUM SERPL-SCNC: 136 MMOL/L (ref 132–146)
SPECIMEN SOURCE: NORMAL
WBC OTHER # BLD: 12.6 K/UL (ref 4.5–11.5)

## 2025-03-17 PROCEDURE — 6370000000 HC RX 637 (ALT 250 FOR IP): Performed by: INTERNAL MEDICINE

## 2025-03-17 PROCEDURE — 6370000000 HC RX 637 (ALT 250 FOR IP): Performed by: NURSE PRACTITIONER

## 2025-03-17 PROCEDURE — 2500000003 HC RX 250 WO HCPCS: Performed by: NURSE PRACTITIONER

## 2025-03-17 PROCEDURE — 99233 SBSQ HOSP IP/OBS HIGH 50: CPT | Performed by: INTERNAL MEDICINE

## 2025-03-17 PROCEDURE — 2060000000 HC ICU INTERMEDIATE R&B

## 2025-03-17 PROCEDURE — 2500000003 HC RX 250 WO HCPCS: Performed by: INTERNAL MEDICINE

## 2025-03-17 PROCEDURE — 6360000002 HC RX W HCPCS: Performed by: INTERNAL MEDICINE

## 2025-03-17 PROCEDURE — 94640 AIRWAY INHALATION TREATMENT: CPT

## 2025-03-17 PROCEDURE — 80053 COMPREHEN METABOLIC PANEL: CPT

## 2025-03-17 PROCEDURE — 2580000003 HC RX 258

## 2025-03-17 PROCEDURE — 6360000002 HC RX W HCPCS: Performed by: NURSE PRACTITIONER

## 2025-03-17 PROCEDURE — 94660 CPAP INITIATION&MGMT: CPT

## 2025-03-17 PROCEDURE — 6370000000 HC RX 637 (ALT 250 FOR IP)

## 2025-03-17 PROCEDURE — 2700000000 HC OXYGEN THERAPY PER DAY

## 2025-03-17 PROCEDURE — 85025 COMPLETE CBC W/AUTO DIFF WBC: CPT

## 2025-03-17 PROCEDURE — 82962 GLUCOSE BLOOD TEST: CPT

## 2025-03-17 PROCEDURE — 36415 COLL VENOUS BLD VENIPUNCTURE: CPT

## 2025-03-17 RX ORDER — AZITHROMYCIN 250 MG/1
500 TABLET, FILM COATED ORAL DAILY
Status: COMPLETED | OUTPATIENT
Start: 2025-03-17 | End: 2025-03-20

## 2025-03-17 RX ORDER — ACETAZOLAMIDE 250 MG/1
250 TABLET ORAL 2 TIMES DAILY
Status: DISCONTINUED | OUTPATIENT
Start: 2025-03-17 | End: 2025-03-17

## 2025-03-17 RX ADMIN — IPRATROPIUM BROMIDE AND ALBUTEROL SULFATE 1 DOSE: .5; 2.5 SOLUTION RESPIRATORY (INHALATION) at 09:37

## 2025-03-17 RX ADMIN — SODIUM CHLORIDE: 0.9 INJECTION, SOLUTION INTRAVENOUS at 08:51

## 2025-03-17 RX ADMIN — AZITHROMYCIN DIHYDRATE 500 MG: 250 TABLET ORAL at 14:06

## 2025-03-17 RX ADMIN — WATER 1000 MG: 1 INJECTION INTRAMUSCULAR; INTRAVENOUS; SUBCUTANEOUS at 00:01

## 2025-03-17 RX ADMIN — PANTOPRAZOLE SODIUM 40 MG: 40 TABLET, DELAYED RELEASE ORAL at 05:03

## 2025-03-17 RX ADMIN — METHYLPREDNISOLONE SODIUM SUCCINATE 40 MG: 40 INJECTION, POWDER, LYOPHILIZED, FOR SOLUTION INTRAMUSCULAR; INTRAVENOUS at 08:48

## 2025-03-17 RX ADMIN — ACETAZOLAMIDE 500 MG: 250 TABLET ORAL at 08:46

## 2025-03-17 RX ADMIN — IPRATROPIUM BROMIDE AND ALBUTEROL SULFATE 1 DOSE: .5; 2.5 SOLUTION RESPIRATORY (INHALATION) at 22:43

## 2025-03-17 RX ADMIN — SUCRALFATE 1 G: 1 TABLET ORAL at 16:36

## 2025-03-17 RX ADMIN — BUDESONIDE 500 MCG: 0.25 SUSPENSION RESPIRATORY (INHALATION) at 16:23

## 2025-03-17 RX ADMIN — POTASSIUM BICARBONATE 40 MEQ: 782 TABLET, EFFERVESCENT ORAL at 10:53

## 2025-03-17 RX ADMIN — SUCRALFATE 1 G: 1 TABLET ORAL at 08:46

## 2025-03-17 RX ADMIN — APIXABAN 5 MG: 5 TABLET, FILM COATED ORAL at 08:46

## 2025-03-17 RX ADMIN — IPRATROPIUM BROMIDE AND ALBUTEROL SULFATE 1 DOSE: .5; 2.5 SOLUTION RESPIRATORY (INHALATION) at 16:24

## 2025-03-17 RX ADMIN — SUCRALFATE 1 G: 1 TABLET ORAL at 12:19

## 2025-03-17 RX ADMIN — METHYLPREDNISOLONE SODIUM SUCCINATE 40 MG: 40 INJECTION, POWDER, LYOPHILIZED, FOR SOLUTION INTRAMUSCULAR; INTRAVENOUS at 21:41

## 2025-03-17 RX ADMIN — APIXABAN 5 MG: 5 TABLET, FILM COATED ORAL at 21:41

## 2025-03-17 RX ADMIN — METOPROLOL SUCCINATE 12.5 MG: 25 TABLET, EXTENDED RELEASE ORAL at 08:46

## 2025-03-17 RX ADMIN — CLOPIDOGREL BISULFATE 75 MG: 75 TABLET, FILM COATED ORAL at 08:46

## 2025-03-17 RX ADMIN — METHYLPREDNISOLONE SODIUM SUCCINATE 40 MG: 40 INJECTION, POWDER, LYOPHILIZED, FOR SOLUTION INTRAMUSCULAR; INTRAVENOUS at 00:00

## 2025-03-17 RX ADMIN — Medication 10 ML: at 08:51

## 2025-03-17 RX ADMIN — Medication 10 ML: at 21:43

## 2025-03-17 RX ADMIN — IPRATROPIUM BROMIDE AND ALBUTEROL SULFATE 1 DOSE: .5; 2.5 SOLUTION RESPIRATORY (INHALATION) at 01:47

## 2025-03-17 NOTE — ACP (ADVANCE CARE PLANNING)
Advance Care Planning   Healthcare Decision Maker:    Primary Decision Maker: Winifred Mock [facility] [phone]

## 2025-03-17 NOTE — PLAN OF CARE
Problem: Chronic Conditions and Co-morbidities  Goal: Patient's chronic conditions and co-morbidity symptoms are monitored and maintained or improved  3/16/2025 2132 by Cinthia Mckay RN  Outcome: Progressing  3/16/2025 1303 by Jazz Johnson RN  Outcome: Progressing     Problem: Discharge Planning  Goal: Discharge to home or other facility with appropriate resources  3/16/2025 2132 by Cinthia Mckay RN  Outcome: Progressing  3/16/2025 1303 by Jazz Johnson RN  Outcome: Progressing     Problem: Skin/Tissue Integrity  Goal: Skin integrity remains intact  Description: 1.  Monitor for areas of redness and/or skin breakdown  2.  Assess vascular access sites hourly  3.  Every 4-6 hours minimum:  Change oxygen saturation probe site  4.  Every 4-6 hours:  If on nasal continuous positive airway pressure, respiratory therapy assess nares and determine need for appliance change or resting period  3/16/2025 2132 by Cinthia Mckay RN  Outcome: Progressing  3/16/2025 1303 by Jazz Johnson RN  Outcome: Progressing     Problem: ABCDS Injury Assessment  Goal: Absence of physical injury  3/16/2025 2132 by Cinthia Mckay RN  Outcome: Progressing  3/16/2025 1303 by Jazz Johnson RN  Outcome: Progressing     Problem: Safety - Adult  Goal: Free from fall injury  3/16/2025 2132 by Cinthia Mckay RN  Outcome: Progressing  3/16/2025 1303 by Jazz Johnson RN  Outcome: Progressing

## 2025-03-17 NOTE — CARE COORDINATION
Case Management Assessment  Initial Evaluation    Date/Time of Evaluation: 3/17/2025 3:03 PM  Assessment Completed by: DELGADO Haque    If patient is discharged prior to next notation, then this note serves as note for discharge by case management.    Patient Name: Compa Obrien                   YOB: 1945  Diagnosis: Renal mass [N28.89]  Pneumonia of right lower lobe due to infectious organism [J18.9]  Acute on chronic respiratory failure with hypoxia and hypercapnia (HCC) [J96.21, J96.22]  Acute on chronic hypoxic respiratory failure (HCC) [J96.21]                   Date / Time: 3/15/2025  4:33 PM    Patient Admission Status: Inpatient   Readmission Risk (Low < 19, Mod (19-27), High > 27): Readmission Risk Score: 13.5    Current PCP: Nigel Alvarado, DO  PCP verified by CM? Yes    Chart Reviewed: Yes      History Provided by: Child/Family, Patient  Patient Orientation: Alert and Oriented    Patient Cognition: Alert    Hospitalization in the last 30 days (Readmission):  No    If yes, Readmission Assessment in CM Navigator will be completed.    Advance Directives:      Code Status: Full Code   Patient's Primary Decision Maker is: Legal Next of Kin    Primary Decision Maker: Winifred Mock - Child - [phone]    Discharge Planning:    Patient lives with: Alone, Other (Comment) (daughter lives next door there is a monitor in the home) Type of Home: House  Primary Care Giver: Family  Patient Support Systems include: Children   Current Financial resources:    Current community resources:    Current services prior to admission: Home Bipap, Oxygen Therapy            Current DME:              Type of Home Care services:  None    ADLS  Prior functional level: Assistance with the following:  Current functional level:      PT AM-PAC:   /24  OT AM-PAC:   /24    Family can provide assistance at DC:    Would you like Case Management to discuss the discharge plan with any other family

## 2025-03-18 ENCOUNTER — APPOINTMENT (OUTPATIENT)
Dept: GENERAL RADIOLOGY | Age: 80
DRG: 193 | End: 2025-03-18
Payer: COMMERCIAL

## 2025-03-18 LAB
AADO2: 154 MMHG
AADO2: 180.3 MMHG
ALBUMIN SERPL-MCNC: 3.3 G/DL (ref 3.5–5.2)
ALP SERPL-CCNC: 92 U/L (ref 35–104)
ALT SERPL-CCNC: 8 U/L (ref 0–32)
ANION GAP SERPL CALCULATED.3IONS-SCNC: 10 MMOL/L (ref 7–16)
AST SERPL-CCNC: 14 U/L (ref 0–31)
B.E.: 6.6 MMOL/L (ref -3–3)
B.E.: 9.1 MMOL/L (ref -3–3)
BASOPHILS # BLD: 0 K/UL (ref 0–0.2)
BASOPHILS NFR BLD: 0 % (ref 0–2)
BILIRUB SERPL-MCNC: 0.2 MG/DL (ref 0–1.2)
BUN SERPL-MCNC: 25 MG/DL (ref 6–23)
CALCIUM SERPL-MCNC: 9.5 MG/DL (ref 8.6–10.2)
CHLORIDE SERPL-SCNC: 94 MMOL/L (ref 98–107)
CO2 SERPL-SCNC: 31 MMOL/L (ref 22–29)
COHB: 0.2 % (ref 0–1.5)
COHB: 0.3 % (ref 0–1.5)
COMMENT: ABNORMAL
CREAT SERPL-MCNC: 0.6 MG/DL (ref 0.5–1)
CRITICAL: ABNORMAL
CRITICAL: ABNORMAL
DATE ANALYZED: ABNORMAL
DATE ANALYZED: ABNORMAL
DATE OF COLLECTION: ABNORMAL
DATE OF COLLECTION: ABNORMAL
EOSINOPHIL # BLD: 0 K/UL (ref 0.05–0.5)
EOSINOPHILS RELATIVE PERCENT: 0 % (ref 0–6)
ERYTHROCYTE [DISTWIDTH] IN BLOOD BY AUTOMATED COUNT: 15 % (ref 11.5–15)
FIO2: 50 %
FIO2: 50 %
GFR, ESTIMATED: >90 ML/MIN/1.73M2
GLUCOSE SERPL-MCNC: 127 MG/DL (ref 74–99)
HCO3: 35.9 MMOL/L (ref 22–26)
HCO3: 38 MMOL/L (ref 22–26)
HCT VFR BLD AUTO: 36.4 % (ref 34–48)
HGB BLD-MCNC: 11 G/DL (ref 11.5–15.5)
HHB: 2.8 % (ref 0–5)
HHB: 5.4 % (ref 0–5)
LAB: ABNORMAL
LAB: ABNORMAL
LYMPHOCYTES NFR BLD: 0.2 K/UL (ref 1.5–4)
LYMPHOCYTES RELATIVE PERCENT: 2 % (ref 20–42)
Lab: 1100
Lab: 1402
MCH RBC QN AUTO: 29.2 PG (ref 26–35)
MCHC RBC AUTO-ENTMCNC: 30.2 G/DL (ref 32–34.5)
MCV RBC AUTO: 96.6 FL (ref 80–99.9)
METHB: 0.1 % (ref 0–1.5)
METHB: 0.3 % (ref 0–1.5)
MODE: ABNORMAL
MODE: ABNORMAL
MONOCYTES NFR BLD: 0.5 K/UL (ref 0.1–0.95)
MONOCYTES NFR BLD: 4 % (ref 2–12)
NEUTROPHILS NFR BLD: 94 % (ref 43–80)
NEUTS SEG NFR BLD: 10.7 K/UL (ref 1.8–7.3)
O2 CONTENT: 15.4 ML/DL
O2 CONTENT: 16.1 ML/DL
O2 SATURATION: 94.6 % (ref 92–98.5)
O2 SATURATION: 97.2 % (ref 92–98.5)
O2HB: 94 % (ref 94–97)
O2HB: 96.9 % (ref 94–97)
OPERATOR ID: 2525
OPERATOR ID: 514
PATIENT TEMP: 37 C
PATIENT TEMP: 37 C
PCO2: 78.4 MMHG (ref 35–45)
PCO2: 80.8 MMHG (ref 35–45)
PEEP/CPAP: 6 CMH2O
PEEP/CPAP: 8 CMH2O
PFO2: 1.51 MMHG/%
PFO2: 1.98 MMHG/%
PH BLOOD GAS: 7.27 (ref 7.35–7.45)
PH BLOOD GAS: 7.3 (ref 7.35–7.45)
PLATELET # BLD AUTO: 301 K/UL (ref 130–450)
PMV BLD AUTO: 10.4 FL (ref 7–12)
PO2: 75.5 MMHG (ref 75–100)
PO2: 99.1 MMHG (ref 75–100)
POTASSIUM SERPL-SCNC: 3.5 MMOL/L (ref 3.5–5)
PROT SERPL-MCNC: 6.4 G/DL (ref 6.4–8.3)
PS: 10 CMH20
PS: 15 CMH20
RBC # BLD AUTO: 3.77 M/UL (ref 3.5–5.5)
RBC # BLD: NORMAL 10*6/UL
RI(T): 1.55
RI(T): 2.39
SODIUM SERPL-SCNC: 135 MMOL/L (ref 132–146)
SOURCE, BLOOD GAS: ABNORMAL
SOURCE, BLOOD GAS: ABNORMAL
THB: 11.6 G/DL (ref 11.5–16.5)
THB: 11.7 G/DL (ref 11.5–16.5)
TIME ANALYZED: 1105
TIME ANALYZED: 1408
WBC OTHER # BLD: 11.4 K/UL (ref 4.5–11.5)

## 2025-03-18 PROCEDURE — 82805 BLOOD GASES W/O2 SATURATION: CPT

## 2025-03-18 PROCEDURE — 6370000000 HC RX 637 (ALT 250 FOR IP): Performed by: INTERNAL MEDICINE

## 2025-03-18 PROCEDURE — 2500000003 HC RX 250 WO HCPCS: Performed by: INTERNAL MEDICINE

## 2025-03-18 PROCEDURE — 6370000000 HC RX 637 (ALT 250 FOR IP): Performed by: NURSE PRACTITIONER

## 2025-03-18 PROCEDURE — 36600 WITHDRAWAL OF ARTERIAL BLOOD: CPT

## 2025-03-18 PROCEDURE — 6360000002 HC RX W HCPCS: Performed by: INTERNAL MEDICINE

## 2025-03-18 PROCEDURE — 99233 SBSQ HOSP IP/OBS HIGH 50: CPT | Performed by: INTERNAL MEDICINE

## 2025-03-18 PROCEDURE — 94660 CPAP INITIATION&MGMT: CPT

## 2025-03-18 PROCEDURE — 94640 AIRWAY INHALATION TREATMENT: CPT

## 2025-03-18 PROCEDURE — 2060000000 HC ICU INTERMEDIATE R&B

## 2025-03-18 PROCEDURE — 6360000002 HC RX W HCPCS: Performed by: NURSE PRACTITIONER

## 2025-03-18 PROCEDURE — 36415 COLL VENOUS BLD VENIPUNCTURE: CPT

## 2025-03-18 PROCEDURE — 80053 COMPREHEN METABOLIC PANEL: CPT

## 2025-03-18 PROCEDURE — 86738 MYCOPLASMA ANTIBODY: CPT

## 2025-03-18 PROCEDURE — 85025 COMPLETE CBC W/AUTO DIFF WBC: CPT

## 2025-03-18 PROCEDURE — 2500000003 HC RX 250 WO HCPCS: Performed by: NURSE PRACTITIONER

## 2025-03-18 PROCEDURE — 6370000000 HC RX 637 (ALT 250 FOR IP)

## 2025-03-18 PROCEDURE — 71045 X-RAY EXAM CHEST 1 VIEW: CPT

## 2025-03-18 PROCEDURE — 2700000000 HC OXYGEN THERAPY PER DAY

## 2025-03-18 RX ORDER — ROFLUMILAST 500 UG/1
250 TABLET ORAL DAILY
Status: DISCONTINUED | OUTPATIENT
Start: 2025-03-18 | End: 2025-03-19

## 2025-03-18 RX ADMIN — METOPROLOL SUCCINATE 12.5 MG: 25 TABLET, EXTENDED RELEASE ORAL at 07:46

## 2025-03-18 RX ADMIN — IPRATROPIUM BROMIDE AND ALBUTEROL SULFATE 1 DOSE: .5; 2.5 SOLUTION RESPIRATORY (INHALATION) at 16:00

## 2025-03-18 RX ADMIN — IPRATROPIUM BROMIDE AND ALBUTEROL SULFATE 1 DOSE: .5; 2.5 SOLUTION RESPIRATORY (INHALATION) at 07:03

## 2025-03-18 RX ADMIN — IPRATROPIUM BROMIDE AND ALBUTEROL SULFATE 1 DOSE: .5; 2.5 SOLUTION RESPIRATORY (INHALATION) at 22:34

## 2025-03-18 RX ADMIN — SUCRALFATE 1 G: 1 TABLET ORAL at 07:46

## 2025-03-18 RX ADMIN — WATER 40 MG: 1 INJECTION INTRAMUSCULAR; INTRAVENOUS; SUBCUTANEOUS at 17:47

## 2025-03-18 RX ADMIN — WATER 2000 MG: 1 INJECTION INTRAMUSCULAR; INTRAVENOUS; SUBCUTANEOUS at 18:19

## 2025-03-18 RX ADMIN — APIXABAN 5 MG: 5 TABLET, FILM COATED ORAL at 07:46

## 2025-03-18 RX ADMIN — Medication 10 ML: at 20:24

## 2025-03-18 RX ADMIN — APIXABAN 5 MG: 5 TABLET, FILM COATED ORAL at 20:24

## 2025-03-18 RX ADMIN — CLOPIDOGREL BISULFATE 75 MG: 75 TABLET, FILM COATED ORAL at 07:46

## 2025-03-18 RX ADMIN — BUDESONIDE 500 MCG: 0.25 SUSPENSION RESPIRATORY (INHALATION) at 07:03

## 2025-03-18 RX ADMIN — WATER 40 MG: 1 INJECTION INTRAMUSCULAR; INTRAVENOUS; SUBCUTANEOUS at 23:31

## 2025-03-18 RX ADMIN — Medication 10 ML: at 07:44

## 2025-03-18 RX ADMIN — PANTOPRAZOLE SODIUM 40 MG: 40 TABLET, DELAYED RELEASE ORAL at 06:20

## 2025-03-18 RX ADMIN — IPRATROPIUM BROMIDE AND ALBUTEROL SULFATE 1 DOSE: .5; 2.5 SOLUTION RESPIRATORY (INHALATION) at 11:06

## 2025-03-18 RX ADMIN — METHYLPREDNISOLONE SODIUM SUCCINATE 40 MG: 40 INJECTION, POWDER, LYOPHILIZED, FOR SOLUTION INTRAMUSCULAR; INTRAVENOUS at 07:51

## 2025-03-18 RX ADMIN — AZITHROMYCIN DIHYDRATE 500 MG: 250 TABLET ORAL at 07:46

## 2025-03-18 RX ADMIN — IPRATROPIUM BROMIDE AND ALBUTEROL SULFATE 1 DOSE: .5; 2.5 SOLUTION RESPIRATORY (INHALATION) at 19:03

## 2025-03-18 RX ADMIN — BUDESONIDE 500 MCG: 0.25 SUSPENSION RESPIRATORY (INHALATION) at 19:03

## 2025-03-18 RX ADMIN — ROFLUMILAST 250 MCG: 500 TABLET ORAL at 18:19

## 2025-03-18 NOTE — PLAN OF CARE
Problem: Chronic Conditions and Co-morbidities  Goal: Patient's chronic conditions and co-morbidity symptoms are monitored and maintained or improved  Outcome: Progressing     Problem: Skin/Tissue Integrity  Goal: Skin integrity remains intact  Description: 1.  Monitor for areas of redness and/or skin breakdown  2.  Assess vascular access sites hourly  3.  Every 4-6 hours minimum:  Change oxygen saturation probe site  4.  Every 4-6 hours:  If on nasal continuous positive airway pressure, respiratory therapy assess nares and determine need for appliance change or resting period  Outcome: Progressing

## 2025-03-19 LAB
ALBUMIN SERPL-MCNC: 3.7 G/DL (ref 3.5–5.2)
ALP SERPL-CCNC: 84 U/L (ref 35–104)
ALT SERPL-CCNC: 10 U/L (ref 0–32)
ANION GAP SERPL CALCULATED.3IONS-SCNC: 13 MMOL/L (ref 7–16)
AST SERPL-CCNC: 15 U/L (ref 0–31)
BASOPHILS # BLD: 0.02 K/UL (ref 0–0.2)
BASOPHILS NFR BLD: 0 % (ref 0–2)
BILIRUB SERPL-MCNC: <0.2 MG/DL (ref 0–1.2)
BUN SERPL-MCNC: 26 MG/DL (ref 6–23)
CALCIUM SERPL-MCNC: 10.4 MG/DL (ref 8.6–10.2)
CHLORIDE SERPL-SCNC: 94 MMOL/L (ref 98–107)
CO2 SERPL-SCNC: 31 MMOL/L (ref 22–29)
CREAT SERPL-MCNC: 0.6 MG/DL (ref 0.5–1)
EOSINOPHIL # BLD: 0 K/UL (ref 0.05–0.5)
EOSINOPHILS RELATIVE PERCENT: 0 % (ref 0–6)
ERYTHROCYTE [DISTWIDTH] IN BLOOD BY AUTOMATED COUNT: 14.9 % (ref 11.5–15)
GFR, ESTIMATED: >90 ML/MIN/1.73M2
GLUCOSE SERPL-MCNC: 147 MG/DL (ref 74–99)
HCT VFR BLD AUTO: 37.4 % (ref 34–48)
HGB BLD-MCNC: 10.9 G/DL (ref 11.5–15.5)
IMM GRANULOCYTES # BLD AUTO: 0.08 K/UL (ref 0–0.58)
IMM GRANULOCYTES NFR BLD: 1 % (ref 0–5)
LYMPHOCYTES NFR BLD: 0.13 K/UL (ref 1.5–4)
LYMPHOCYTES RELATIVE PERCENT: 2 % (ref 20–42)
MCH RBC QN AUTO: 28.8 PG (ref 26–35)
MCHC RBC AUTO-ENTMCNC: 29.1 G/DL (ref 32–34.5)
MCV RBC AUTO: 98.9 FL (ref 80–99.9)
MONOCYTES NFR BLD: 0.44 K/UL (ref 0.1–0.95)
MONOCYTES NFR BLD: 5 % (ref 2–12)
MYCOPLASMA AB,IGM: NORMAL
NEUTROPHILS NFR BLD: 93 % (ref 43–80)
NEUTS SEG NFR BLD: 8.26 K/UL (ref 1.8–7.3)
PLATELET # BLD AUTO: 315 K/UL (ref 130–450)
PMV BLD AUTO: 10.7 FL (ref 7–12)
POTASSIUM SERPL-SCNC: 4.6 MMOL/L (ref 3.5–5)
PROT SERPL-MCNC: 6.9 G/DL (ref 6.4–8.3)
RBC # BLD AUTO: 3.78 M/UL (ref 3.5–5.5)
RBC # BLD: ABNORMAL 10*6/UL
RBC # BLD: ABNORMAL 10*6/UL
SODIUM SERPL-SCNC: 138 MMOL/L (ref 132–146)
WBC OTHER # BLD: 8.9 K/UL (ref 4.5–11.5)

## 2025-03-19 PROCEDURE — 85025 COMPLETE CBC W/AUTO DIFF WBC: CPT

## 2025-03-19 PROCEDURE — 97161 PT EVAL LOW COMPLEX 20 MIN: CPT | Performed by: PHYSICAL THERAPIST

## 2025-03-19 PROCEDURE — 97535 SELF CARE MNGMENT TRAINING: CPT

## 2025-03-19 PROCEDURE — 97165 OT EVAL LOW COMPLEX 30 MIN: CPT

## 2025-03-19 PROCEDURE — 6370000000 HC RX 637 (ALT 250 FOR IP): Performed by: INTERNAL MEDICINE

## 2025-03-19 PROCEDURE — 94660 CPAP INITIATION&MGMT: CPT

## 2025-03-19 PROCEDURE — 2060000000 HC ICU INTERMEDIATE R&B

## 2025-03-19 PROCEDURE — 6370000000 HC RX 637 (ALT 250 FOR IP)

## 2025-03-19 PROCEDURE — 6370000000 HC RX 637 (ALT 250 FOR IP): Performed by: NURSE PRACTITIONER

## 2025-03-19 PROCEDURE — 6360000002 HC RX W HCPCS: Performed by: INTERNAL MEDICINE

## 2025-03-19 PROCEDURE — 97530 THERAPEUTIC ACTIVITIES: CPT

## 2025-03-19 PROCEDURE — 2700000000 HC OXYGEN THERAPY PER DAY

## 2025-03-19 PROCEDURE — 99233 SBSQ HOSP IP/OBS HIGH 50: CPT | Performed by: INTERNAL MEDICINE

## 2025-03-19 PROCEDURE — 36415 COLL VENOUS BLD VENIPUNCTURE: CPT

## 2025-03-19 PROCEDURE — 2500000003 HC RX 250 WO HCPCS: Performed by: INTERNAL MEDICINE

## 2025-03-19 PROCEDURE — 6360000002 HC RX W HCPCS: Performed by: NURSE PRACTITIONER

## 2025-03-19 PROCEDURE — 97530 THERAPEUTIC ACTIVITIES: CPT | Performed by: PHYSICAL THERAPIST

## 2025-03-19 PROCEDURE — 2500000003 HC RX 250 WO HCPCS: Performed by: NURSE PRACTITIONER

## 2025-03-19 PROCEDURE — 94640 AIRWAY INHALATION TREATMENT: CPT

## 2025-03-19 PROCEDURE — 80053 COMPREHEN METABOLIC PANEL: CPT

## 2025-03-19 RX ORDER — ROFLUMILAST 500 UG/1
500 TABLET ORAL DAILY
Status: DISCONTINUED | OUTPATIENT
Start: 2025-03-20 | End: 2025-03-21

## 2025-03-19 RX ADMIN — METOPROLOL SUCCINATE 12.5 MG: 25 TABLET, EXTENDED RELEASE ORAL at 08:37

## 2025-03-19 RX ADMIN — IPRATROPIUM BROMIDE AND ALBUTEROL SULFATE 1 DOSE: .5; 2.5 SOLUTION RESPIRATORY (INHALATION) at 19:28

## 2025-03-19 RX ADMIN — IPRATROPIUM BROMIDE AND ALBUTEROL SULFATE 1 DOSE: .5; 2.5 SOLUTION RESPIRATORY (INHALATION) at 05:24

## 2025-03-19 RX ADMIN — ROFLUMILAST 250 MCG: 500 TABLET ORAL at 08:37

## 2025-03-19 RX ADMIN — IPRATROPIUM BROMIDE AND ALBUTEROL SULFATE 1 DOSE: .5; 2.5 SOLUTION RESPIRATORY (INHALATION) at 09:43

## 2025-03-19 RX ADMIN — IPRATROPIUM BROMIDE AND ALBUTEROL SULFATE 1 DOSE: .5; 2.5 SOLUTION RESPIRATORY (INHALATION) at 02:09

## 2025-03-19 RX ADMIN — Medication 10 ML: at 08:38

## 2025-03-19 RX ADMIN — BUDESONIDE 500 MCG: 0.25 SUSPENSION RESPIRATORY (INHALATION) at 05:24

## 2025-03-19 RX ADMIN — CLOPIDOGREL BISULFATE 75 MG: 75 TABLET, FILM COATED ORAL at 08:37

## 2025-03-19 RX ADMIN — AZITHROMYCIN DIHYDRATE 500 MG: 250 TABLET ORAL at 08:37

## 2025-03-19 RX ADMIN — WATER 40 MG: 1 INJECTION INTRAMUSCULAR; INTRAVENOUS; SUBCUTANEOUS at 17:55

## 2025-03-19 RX ADMIN — Medication 10 ML: at 21:21

## 2025-03-19 RX ADMIN — APIXABAN 5 MG: 5 TABLET, FILM COATED ORAL at 21:20

## 2025-03-19 RX ADMIN — BUDESONIDE 500 MCG: 0.25 SUSPENSION RESPIRATORY (INHALATION) at 19:29

## 2025-03-19 RX ADMIN — SUCRALFATE 1 G: 1 TABLET ORAL at 21:20

## 2025-03-19 RX ADMIN — WATER 2000 MG: 1 INJECTION INTRAMUSCULAR; INTRAVENOUS; SUBCUTANEOUS at 09:41

## 2025-03-19 RX ADMIN — SUCRALFATE 1 G: 1 TABLET ORAL at 08:37

## 2025-03-19 RX ADMIN — PANTOPRAZOLE SODIUM 40 MG: 40 TABLET, DELAYED RELEASE ORAL at 05:32

## 2025-03-19 RX ADMIN — IPRATROPIUM BROMIDE AND ALBUTEROL SULFATE 1 DOSE: .5; 2.5 SOLUTION RESPIRATORY (INHALATION) at 13:24

## 2025-03-19 RX ADMIN — APIXABAN 5 MG: 5 TABLET, FILM COATED ORAL at 08:37

## 2025-03-19 RX ADMIN — IPRATROPIUM BROMIDE AND ALBUTEROL SULFATE 1 DOSE: .5; 2.5 SOLUTION RESPIRATORY (INHALATION) at 23:00

## 2025-03-19 RX ADMIN — WATER 40 MG: 1 INJECTION INTRAMUSCULAR; INTRAVENOUS; SUBCUTANEOUS at 09:38

## 2025-03-19 NOTE — PLAN OF CARE
Problem: Chronic Conditions and Co-morbidities  Goal: Patient's chronic conditions and co-morbidity symptoms are monitored and maintained or improved  3/18/2025 2143 by Carlos Alberto Manriquez RN  Outcome: Progressing  3/18/2025 1333 by Bayron Mccarty RN  Outcome: Progressing  Flowsheets (Taken 3/18/2025 0800)  Care Plan - Patient's Chronic Conditions and Co-Morbidity Symptoms are Monitored and Maintained or Improved: Monitor and assess patient's chronic conditions and comorbid symptoms for stability, deterioration, or improvement     Problem: Discharge Planning  Goal: Discharge to home or other facility with appropriate resources  3/18/2025 2143 by Carlos Alberto Manriquez RN  Outcome: Progressing  3/18/2025 1333 by Bayron Mccarty RN  Outcome: Progressing  Flowsheets (Taken 3/18/2025 0800)  Discharge to home or other facility with appropriate resources: Identify barriers to discharge with patient and caregiver     Problem: Skin/Tissue Integrity  Goal: Skin integrity remains intact  Description: 1.  Monitor for areas of redness and/or skin breakdown  2.  Assess vascular access sites hourly  3.  Every 4-6 hours minimum:  Change oxygen saturation probe site  4.  Every 4-6 hours:  If on nasal continuous positive airway pressure, respiratory therapy assess nares and determine need for appliance change or resting period  3/18/2025 2143 by Carlos Alberto Manriquez RN  Outcome: Progressing  3/18/2025 1333 by Bayron Mccarty RN  Outcome: Progressing  Flowsheets (Taken 3/18/2025 0800)  Skin Integrity Remains Intact: Monitor for areas of redness and/or skin breakdown     Problem: ABCDS Injury Assessment  Goal: Absence of physical injury  Outcome: Progressing     Problem: Safety - Adult  Goal: Free from fall injury  Outcome: Progressing     Problem: Nutrition Deficit:  Goal: Optimize nutritional status  Outcome: Progressing

## 2025-03-19 NOTE — CARE COORDINATION
3/19/25 Dx Acute on chronic hypoxic respiratory failure. Contact isolation for Human Metapneumovirus. Ordered IV rocephin, solumedrol, & fluids. Pulmonology is following. Pt currently on 6L of O2; PAP @ HS; pts baseline is 5L through [employer]. Has bipap @ home, but is non-complaint with use. Has following DME at home: wc, ww, rollator, sc, nebulizer.   Per initial assessment, plan is for pt to return home independently with strong daily support from Winifred/Jacobo whom resides next door. Awaiting PT/OT recommendations for further discussion. Hx of using [facility] for SAR[facility] placement. Dr. Alvarado & [facility] is pharmacy. SW following. Electronically signed by DELGADO Tom on 3/19/2025 at 3:09 PM

## 2025-03-19 NOTE — PLAN OF CARE
Problem: Chronic Conditions and Co-morbidities  Goal: Patient's chronic conditions and co-morbidity symptoms are monitored and maintained or improved  3/19/2025 1057 by Bayron Mccarty RN  Outcome: Progressing  3/18/2025 2143 by Carlos Alberto Manriquez RN  Outcome: Progressing     Problem: Discharge Planning  Goal: Discharge to home or other facility with appropriate resources  3/19/2025 1057 by Bayron Mccarty RN  Outcome: Progressing  3/18/2025 2143 by Carlos Alberto Manriquez RN  Outcome: Progressing     Problem: Skin/Tissue Integrity  Goal: Skin integrity remains intact  Description: 1.  Monitor for areas of redness and/or skin breakdown  2.  Assess vascular access sites hourly  3.  Every 4-6 hours minimum:  Change oxygen saturation probe site  4.  Every 4-6 hours:  If on nasal continuous positive airway pressure, respiratory therapy assess nares and determine need for appliance change or resting period  3/19/2025 1057 by Bayron Mccarty RN  Outcome: Progressing  3/18/2025 2143 by Carlos Alberto Manriquez RN  Outcome: Progressing

## 2025-03-20 ENCOUNTER — APPOINTMENT (OUTPATIENT)
Dept: GENERAL RADIOLOGY | Age: 80
DRG: 193 | End: 2025-03-20
Payer: COMMERCIAL

## 2025-03-20 LAB
AADO2: 119.4 MMHG
ALBUMIN SERPL-MCNC: 3.6 G/DL (ref 3.5–5.2)
ALP SERPL-CCNC: 85 U/L (ref 35–104)
ALT SERPL-CCNC: 13 U/L (ref 0–32)
ANION GAP SERPL CALCULATED.3IONS-SCNC: 10 MMOL/L (ref 7–16)
AST SERPL-CCNC: 22 U/L (ref 0–31)
B.E.: 11 MMOL/L (ref -3–3)
BASOPHILS # BLD: 0 K/UL (ref 0–0.2)
BASOPHILS NFR BLD: 0 % (ref 0–2)
BILIRUB SERPL-MCNC: 0.2 MG/DL (ref 0–1.2)
BUN SERPL-MCNC: 27 MG/DL (ref 6–23)
CALCIUM SERPL-MCNC: 9.8 MG/DL (ref 8.6–10.2)
CHLORIDE SERPL-SCNC: 89 MMOL/L (ref 98–107)
CO2 SERPL-SCNC: 33 MMOL/L (ref 22–29)
COHB: 0.3 % (ref 0–1.5)
CREAT SERPL-MCNC: 0.6 MG/DL (ref 0.5–1)
CRITICAL: ABNORMAL
DATE ANALYZED: ABNORMAL
DATE OF COLLECTION: ABNORMAL
EOSINOPHIL # BLD: 0 K/UL (ref 0.05–0.5)
EOSINOPHILS RELATIVE PERCENT: 0 % (ref 0–6)
ERYTHROCYTE [DISTWIDTH] IN BLOOD BY AUTOMATED COUNT: 15 % (ref 11.5–15)
FIO2: 40 %
GFR, ESTIMATED: >90 ML/MIN/1.73M2
GLUCOSE SERPL-MCNC: 188 MG/DL (ref 74–99)
HCO3: 38 MMOL/L (ref 22–26)
HCT VFR BLD AUTO: 33.5 % (ref 34–48)
HGB BLD-MCNC: 10.3 G/DL (ref 11.5–15.5)
HHB: 3.9 % (ref 0–5)
LAB: ABNORMAL
LYMPHOCYTES NFR BLD: 0 K/UL (ref 1.5–4)
LYMPHOCYTES RELATIVE PERCENT: 0 % (ref 20–42)
Lab: 1255
MCH RBC QN AUTO: 29.1 PG (ref 26–35)
MCHC RBC AUTO-ENTMCNC: 30.7 G/DL (ref 32–34.5)
MCV RBC AUTO: 94.6 FL (ref 80–99.9)
METHB: 0.3 % (ref 0–1.5)
MODE: ABNORMAL
MONOCYTES NFR BLD: 0.26 K/UL (ref 0.1–0.95)
MONOCYTES NFR BLD: 4 % (ref 2–12)
MYELOCYTES ABSOLUTE COUNT: 0.06 K/UL
MYELOCYTES: 1 %
NEUTROPHILS NFR BLD: 96 % (ref 43–80)
NEUTS SEG NFR BLD: 6.98 K/UL (ref 1.8–7.3)
O2 CONTENT: 15 ML/DL
O2 SATURATION: 96.1 % (ref 92–98.5)
O2HB: 95.5 % (ref 94–97)
OPERATOR ID: 1119
PATIENT TEMP: 37 C
PCO2: 64 MMHG (ref 35–45)
PEEP/CPAP: 7 CMH2O
PFO2: 2.05 MMHG/%
PH BLOOD GAS: 7.39 (ref 7.35–7.45)
PLATELET # BLD AUTO: 315 K/UL (ref 130–450)
PMV BLD AUTO: 10.5 FL (ref 7–12)
PO2: 82.2 MMHG (ref 75–100)
POTASSIUM SERPL-SCNC: 4.9 MMOL/L (ref 3.5–5)
PROT SERPL-MCNC: 6.7 G/DL (ref 6.4–8.3)
PS: 13 CMH20
RBC # BLD AUTO: 3.54 M/UL (ref 3.5–5.5)
RBC # BLD: ABNORMAL 10*6/UL
RI(T): 1.45
SODIUM SERPL-SCNC: 132 MMOL/L (ref 132–146)
SOURCE, BLOOD GAS: ABNORMAL
THB: 11.1 G/DL (ref 11.5–16.5)
TIME ANALYZED: 1307
WBC OTHER # BLD: 7.3 K/UL (ref 4.5–11.5)

## 2025-03-20 PROCEDURE — 2060000000 HC ICU INTERMEDIATE R&B

## 2025-03-20 PROCEDURE — 6370000000 HC RX 637 (ALT 250 FOR IP): Performed by: INTERNAL MEDICINE

## 2025-03-20 PROCEDURE — 82805 BLOOD GASES W/O2 SATURATION: CPT

## 2025-03-20 PROCEDURE — 85025 COMPLETE CBC W/AUTO DIFF WBC: CPT

## 2025-03-20 PROCEDURE — 71045 X-RAY EXAM CHEST 1 VIEW: CPT

## 2025-03-20 PROCEDURE — 2700000000 HC OXYGEN THERAPY PER DAY

## 2025-03-20 PROCEDURE — 6360000002 HC RX W HCPCS: Performed by: INTERNAL MEDICINE

## 2025-03-20 PROCEDURE — 6370000000 HC RX 637 (ALT 250 FOR IP): Performed by: NURSE PRACTITIONER

## 2025-03-20 PROCEDURE — 94660 CPAP INITIATION&MGMT: CPT

## 2025-03-20 PROCEDURE — 2500000003 HC RX 250 WO HCPCS: Performed by: INTERNAL MEDICINE

## 2025-03-20 PROCEDURE — 6370000000 HC RX 637 (ALT 250 FOR IP)

## 2025-03-20 PROCEDURE — 94640 AIRWAY INHALATION TREATMENT: CPT

## 2025-03-20 PROCEDURE — 99232 SBSQ HOSP IP/OBS MODERATE 35: CPT | Performed by: INTERNAL MEDICINE

## 2025-03-20 PROCEDURE — 36600 WITHDRAWAL OF ARTERIAL BLOOD: CPT

## 2025-03-20 PROCEDURE — 36415 COLL VENOUS BLD VENIPUNCTURE: CPT

## 2025-03-20 PROCEDURE — 6360000002 HC RX W HCPCS: Performed by: NURSE PRACTITIONER

## 2025-03-20 PROCEDURE — 2500000003 HC RX 250 WO HCPCS: Performed by: NURSE PRACTITIONER

## 2025-03-20 PROCEDURE — 80053 COMPREHEN METABOLIC PANEL: CPT

## 2025-03-20 RX ORDER — METOPROLOL SUCCINATE 50 MG/1
50 TABLET, EXTENDED RELEASE ORAL ONCE
Status: COMPLETED | OUTPATIENT
Start: 2025-03-20 | End: 2025-03-20

## 2025-03-20 RX ORDER — HYDRALAZINE HYDROCHLORIDE 20 MG/ML
10 INJECTION INTRAMUSCULAR; INTRAVENOUS EVERY 4 HOURS PRN
Status: DISCONTINUED | OUTPATIENT
Start: 2025-03-20 | End: 2025-03-21

## 2025-03-20 RX ORDER — METOPROLOL SUCCINATE 50 MG/1
50 TABLET, EXTENDED RELEASE ORAL DAILY
Status: DISCONTINUED | OUTPATIENT
Start: 2025-03-21 | End: 2025-03-21

## 2025-03-20 RX ADMIN — CLOPIDOGREL BISULFATE 75 MG: 75 TABLET, FILM COATED ORAL at 09:02

## 2025-03-20 RX ADMIN — METOPROLOL SUCCINATE 12.5 MG: 25 TABLET, EXTENDED RELEASE ORAL at 09:03

## 2025-03-20 RX ADMIN — IPRATROPIUM BROMIDE AND ALBUTEROL SULFATE 1 DOSE: .5; 2.5 SOLUTION RESPIRATORY (INHALATION) at 02:06

## 2025-03-20 RX ADMIN — IPRATROPIUM BROMIDE AND ALBUTEROL SULFATE 1 DOSE: .5; 2.5 SOLUTION RESPIRATORY (INHALATION) at 05:05

## 2025-03-20 RX ADMIN — APIXABAN 5 MG: 5 TABLET, FILM COATED ORAL at 20:29

## 2025-03-20 RX ADMIN — SUCRALFATE 1 G: 1 TABLET ORAL at 20:28

## 2025-03-20 RX ADMIN — AZITHROMYCIN DIHYDRATE 500 MG: 250 TABLET ORAL at 09:03

## 2025-03-20 RX ADMIN — Medication 10 ML: at 20:36

## 2025-03-20 RX ADMIN — ROFLUMILAST 500 MCG: 500 TABLET ORAL at 09:02

## 2025-03-20 RX ADMIN — METOPROLOL SUCCINATE 50 MG: 50 TABLET, EXTENDED RELEASE ORAL at 20:29

## 2025-03-20 RX ADMIN — IPRATROPIUM BROMIDE AND ALBUTEROL SULFATE 1 DOSE: .5; 2.5 SOLUTION RESPIRATORY (INHALATION) at 19:21

## 2025-03-20 RX ADMIN — IPRATROPIUM BROMIDE AND ALBUTEROL SULFATE 1 DOSE: .5; 2.5 SOLUTION RESPIRATORY (INHALATION) at 22:06

## 2025-03-20 RX ADMIN — BUDESONIDE 500 MCG: 0.25 SUSPENSION RESPIRATORY (INHALATION) at 19:21

## 2025-03-20 RX ADMIN — Medication 10 ML: at 09:04

## 2025-03-20 RX ADMIN — WATER 2000 MG: 1 INJECTION INTRAMUSCULAR; INTRAVENOUS; SUBCUTANEOUS at 09:03

## 2025-03-20 RX ADMIN — IPRATROPIUM BROMIDE AND ALBUTEROL SULFATE 1 DOSE: .5; 2.5 SOLUTION RESPIRATORY (INHALATION) at 10:50

## 2025-03-20 RX ADMIN — IPRATROPIUM BROMIDE AND ALBUTEROL SULFATE 1 DOSE: .5; 2.5 SOLUTION RESPIRATORY (INHALATION) at 15:01

## 2025-03-20 RX ADMIN — PANTOPRAZOLE SODIUM 40 MG: 40 TABLET, DELAYED RELEASE ORAL at 05:52

## 2025-03-20 RX ADMIN — WATER 40 MG: 1 INJECTION INTRAMUSCULAR; INTRAVENOUS; SUBCUTANEOUS at 09:03

## 2025-03-20 RX ADMIN — WATER 40 MG: 1 INJECTION INTRAMUSCULAR; INTRAVENOUS; SUBCUTANEOUS at 18:05

## 2025-03-20 RX ADMIN — BUDESONIDE 500 MCG: 0.25 SUSPENSION RESPIRATORY (INHALATION) at 05:05

## 2025-03-20 RX ADMIN — APIXABAN 5 MG: 5 TABLET, FILM COATED ORAL at 09:03

## 2025-03-20 RX ADMIN — WATER 40 MG: 1 INJECTION INTRAMUSCULAR; INTRAVENOUS; SUBCUTANEOUS at 02:24

## 2025-03-20 NOTE — CARE COORDINATION
3/20/25  Note:  Dx Acute on chronic hypoxic respiratory failure. Contact isolation for Human Metapneumovirus. Ordered IV rocephin, solumedrol, & fluids. Pulmonology is following & ordered a repeat chest x-ray today. Pt intermittently using PAP & O2 @ 6L.  Pts baseline is 5L through [employer]. Has bipap @ home, but is non-complaint with use. PT/OT recommended [facility] placement. Followed up with pt & discussed therapies recommendation, pt did request referral to [facility] d/t prior placement there. Completed referral to [facility]/liaison by , awaiting response. Choiced pt for SNF placement in the event [facility] does not accept. For SNF placement, [facility], [facility], [facility] needed prior to discharge.  SW following. Electronically signed by DELGADO Tom on 3/20/2025 at 3:34 PM      The Plan for Transition of Care is related to the following treatment goals: SNF    The Patient was provided with a choice of provider and agrees   with the discharge plan. [x] Yes [] No    Freedom of choice list was provided with basic dialogue that supports the patient's individualized plan of care/goals, treatment preferences and shares the quality data associated with the providers. [x] Yes [] No

## 2025-03-20 NOTE — PLAN OF CARE
Problem: Chronic Conditions and Co-morbidities  Goal: Patient's chronic conditions and co-morbidity symptoms are monitored and maintained or improved  3/20/2025 1720 by Niesha Parker RN  Outcome: Progressing  Flowsheets (Taken 3/20/2025 0900)  Care Plan - Patient's Chronic Conditions and Co-Morbidity Symptoms are Monitored and Maintained or Improved: Monitor and assess patient's chronic conditions and comorbid symptoms for stability, deterioration, or improvement  3/20/2025 0615 by Bj Davila RN  Outcome: Progressing  Flowsheets (Taken 3/19/2025 2000)  Care Plan - Patient's Chronic Conditions and Co-Morbidity Symptoms are Monitored and Maintained or Improved:   Monitor and assess patient's chronic conditions and comorbid symptoms for stability, deterioration, or improvement   Collaborate with multidisciplinary team to address chronic and comorbid conditions and prevent exacerbation or deterioration   Update acute care plan with appropriate goals if chronic or comorbid symptoms are exacerbated and prevent overall improvement and discharge     Problem: Discharge Planning  Goal: Discharge to home or other facility with appropriate resources  3/20/2025 1720 by Niesha Parker RN  Outcome: Progressing  Flowsheets (Taken 3/20/2025 0900)  Discharge to home or other facility with appropriate resources: Identify barriers to discharge with patient and caregiver  3/20/2025 0615 by Bj Davila RN  Outcome: Progressing  Flowsheets (Taken 3/19/2025 2000)  Discharge to home or other facility with appropriate resources:   Identify barriers to discharge with patient and caregiver   Arrange for needed discharge resources and transportation as appropriate   Identify discharge learning needs (meds, wound care, etc)   Refer to discharge planning if patient needs post-hospital services based on physician order or complex needs related to functional status, cognitive ability or social support system     Problem:

## 2025-03-21 ENCOUNTER — APPOINTMENT (OUTPATIENT)
Dept: GENERAL RADIOLOGY | Age: 80
DRG: 193 | End: 2025-03-21
Payer: COMMERCIAL

## 2025-03-21 PROBLEM — Z51.5 PALLIATIVE CARE ENCOUNTER: Status: ACTIVE | Noted: 2025-03-21

## 2025-03-21 PROBLEM — J96.22 ACUTE ON CHRONIC RESPIRATORY FAILURE WITH HYPOXIA AND HYPERCAPNIA: Status: ACTIVE | Noted: 2025-03-15

## 2025-03-21 LAB
AADO2: 177.7 MMHG
ALBUMIN SERPL-MCNC: 3.7 G/DL (ref 3.5–5.2)
ALP SERPL-CCNC: 80 U/L (ref 35–104)
ALT SERPL-CCNC: 14 U/L (ref 0–32)
ANION GAP SERPL CALCULATED.3IONS-SCNC: 11 MMOL/L (ref 7–16)
AST SERPL-CCNC: 19 U/L (ref 0–31)
B.E.: 7.7 MMOL/L (ref -3–3)
BASOPHILS # BLD: 0 K/UL (ref 0–0.2)
BASOPHILS NFR BLD: 0 % (ref 0–2)
BILIRUB SERPL-MCNC: 0.3 MG/DL (ref 0–1.2)
BUN SERPL-MCNC: 25 MG/DL (ref 6–23)
CALCIUM SERPL-MCNC: 9.8 MG/DL (ref 8.6–10.2)
CHLORIDE SERPL-SCNC: 89 MMOL/L (ref 98–107)
CO2 SERPL-SCNC: 34 MMOL/L (ref 22–29)
COHB: 0.2 % (ref 0–1.5)
COMMENT: ABNORMAL
CREAT SERPL-MCNC: 0.5 MG/DL (ref 0.5–1)
CRITICAL: ABNORMAL
DATE ANALYZED: ABNORMAL
DATE OF COLLECTION: ABNORMAL
EOSINOPHIL # BLD: 0 K/UL (ref 0.05–0.5)
EOSINOPHILS RELATIVE PERCENT: 0 % (ref 0–6)
ERYTHROCYTE [DISTWIDTH] IN BLOOD BY AUTOMATED COUNT: 15.2 % (ref 11.5–15)
FIO2: 50 %
GFR, ESTIMATED: >90 ML/MIN/1.73M2
GLUCOSE SERPL-MCNC: 150 MG/DL (ref 74–99)
HCO3: 35.5 MMOL/L (ref 22–26)
HCT VFR BLD AUTO: 32.6 % (ref 34–48)
HGB BLD-MCNC: 10 G/DL (ref 11.5–15.5)
HHB: 3.6 % (ref 0–5)
LAB: ABNORMAL
LYMPHOCYTES NFR BLD: 0.35 K/UL (ref 1.5–4)
LYMPHOCYTES RELATIVE PERCENT: 4 % (ref 20–42)
Lab: 800
MCH RBC QN AUTO: 29 PG (ref 26–35)
MCHC RBC AUTO-ENTMCNC: 30.7 G/DL (ref 32–34.5)
MCV RBC AUTO: 94.5 FL (ref 80–99.9)
METAMYELOCYTES ABSOLUTE COUNT: 0.09 K/UL (ref 0–0.12)
METAMYELOCYTES: 1 % (ref 0–1)
METHB: 0.1 % (ref 0–1.5)
MODE: ABNORMAL
MONOCYTES NFR BLD: 0.27 K/UL (ref 0.1–0.95)
MONOCYTES NFR BLD: 3 % (ref 2–12)
MYELOCYTES ABSOLUTE COUNT: 0.09 K/UL
MYELOCYTES: 1 %
NEUTROPHILS NFR BLD: 91 % (ref 43–80)
NEUTS SEG NFR BLD: 9.12 K/UL (ref 1.8–7.3)
O2 CONTENT: 15.1 ML/DL
O2 SATURATION: 96.4 % (ref 92–98.5)
O2HB: 96.1 % (ref 94–97)
OPERATOR ID: 797
PATIENT TEMP: 37 C
PCO2: 68.9 MMHG (ref 35–45)
PEEP/CPAP: 8 CMH2O
PFO2: 1.78 MMHG/%
PH BLOOD GAS: 7.33 (ref 7.35–7.45)
PIP: 13 CMH2O
PLATELET # BLD AUTO: 392 K/UL (ref 130–450)
PMV BLD AUTO: 10.5 FL (ref 7–12)
PO2: 88.8 MMHG (ref 75–100)
POTASSIUM SERPL-SCNC: 4.3 MMOL/L (ref 3.5–5)
PROMYELOCYTES ABSOLUTE COUNT: 0.09 K/UL
PROMYELOCYTES: 1 %
PROT SERPL-MCNC: 6.4 G/DL (ref 6.4–8.3)
RBC # BLD AUTO: 3.45 M/UL (ref 3.5–5.5)
RBC # BLD: ABNORMAL 10*6/UL
RI(T): 2
RR MECHANICAL: 18 B/MIN
SODIUM SERPL-SCNC: 134 MMOL/L (ref 132–146)
SOURCE, BLOOD GAS: ABNORMAL
THB: 11.1 G/DL (ref 11.5–16.5)
TIME ANALYZED: 809
WBC OTHER # BLD: 10 K/UL (ref 4.5–11.5)

## 2025-03-21 PROCEDURE — 94761 N-INVAS EAR/PLS OXIMETRY MLT: CPT

## 2025-03-21 PROCEDURE — 82805 BLOOD GASES W/O2 SATURATION: CPT

## 2025-03-21 PROCEDURE — 6370000000 HC RX 637 (ALT 250 FOR IP): Performed by: INTERNAL MEDICINE

## 2025-03-21 PROCEDURE — 99291 CRITICAL CARE FIRST HOUR: CPT | Performed by: INTERNAL MEDICINE

## 2025-03-21 PROCEDURE — 6360000002 HC RX W HCPCS

## 2025-03-21 PROCEDURE — 94660 CPAP INITIATION&MGMT: CPT

## 2025-03-21 PROCEDURE — 2500000003 HC RX 250 WO HCPCS: Performed by: INTERNAL MEDICINE

## 2025-03-21 PROCEDURE — 85025 COMPLETE CBC W/AUTO DIFF WBC: CPT

## 2025-03-21 PROCEDURE — 6360000002 HC RX W HCPCS: Performed by: INTERNAL MEDICINE

## 2025-03-21 PROCEDURE — 2700000000 HC OXYGEN THERAPY PER DAY

## 2025-03-21 PROCEDURE — 71045 X-RAY EXAM CHEST 1 VIEW: CPT

## 2025-03-21 PROCEDURE — 99231 SBSQ HOSP IP/OBS SF/LOW 25: CPT | Performed by: INTERNAL MEDICINE

## 2025-03-21 PROCEDURE — 99223 1ST HOSP IP/OBS HIGH 75: CPT | Performed by: NURSE PRACTITIONER

## 2025-03-21 PROCEDURE — 36600 WITHDRAWAL OF ARTERIAL BLOOD: CPT

## 2025-03-21 PROCEDURE — 80053 COMPREHEN METABOLIC PANEL: CPT

## 2025-03-21 PROCEDURE — 6360000002 HC RX W HCPCS: Performed by: NURSE PRACTITIONER

## 2025-03-21 PROCEDURE — 2580000003 HC RX 258: Performed by: NURSE PRACTITIONER

## 2025-03-21 PROCEDURE — 94640 AIRWAY INHALATION TREATMENT: CPT

## 2025-03-21 PROCEDURE — 2060000000 HC ICU INTERMEDIATE R&B

## 2025-03-21 PROCEDURE — 36415 COLL VENOUS BLD VENIPUNCTURE: CPT

## 2025-03-21 RX ORDER — GLYCOPYRROLATE 0.2 MG/ML
0.4 INJECTION INTRAMUSCULAR; INTRAVENOUS EVERY 4 HOURS PRN
Status: DISCONTINUED | OUTPATIENT
Start: 2025-03-21 | End: 2025-03-23 | Stop reason: HOSPADM

## 2025-03-21 RX ORDER — LORAZEPAM 2 MG/ML
1 INJECTION INTRAMUSCULAR
Status: DISCONTINUED | OUTPATIENT
Start: 2025-03-21 | End: 2025-03-23 | Stop reason: HOSPADM

## 2025-03-21 RX ORDER — IPRATROPIUM BROMIDE AND ALBUTEROL SULFATE 2.5; .5 MG/3ML; MG/3ML
1 SOLUTION RESPIRATORY (INHALATION)
Status: COMPLETED | OUTPATIENT
Start: 2025-03-21 | End: 2025-03-21

## 2025-03-21 RX ORDER — MORPHINE SULFATE 4 MG/ML
4 INJECTION, SOLUTION INTRAMUSCULAR; INTRAVENOUS
Status: DISCONTINUED | OUTPATIENT
Start: 2025-03-21 | End: 2025-03-23 | Stop reason: HOSPADM

## 2025-03-21 RX ORDER — MORPHINE SULFATE 2 MG/ML
2 INJECTION, SOLUTION INTRAMUSCULAR; INTRAVENOUS
Status: DISCONTINUED | OUTPATIENT
Start: 2025-03-21 | End: 2025-03-23 | Stop reason: HOSPADM

## 2025-03-21 RX ADMIN — BUDESONIDE 500 MCG: 0.25 SUSPENSION RESPIRATORY (INHALATION) at 06:58

## 2025-03-21 RX ADMIN — GLYCOPYRROLATE 0.4 MG: 0.2 INJECTION INTRAMUSCULAR; INTRAVENOUS at 13:02

## 2025-03-21 RX ADMIN — PANTOPRAZOLE SODIUM 40 MG: 40 TABLET, DELAYED RELEASE ORAL at 05:32

## 2025-03-21 RX ADMIN — MORPHINE SULFATE 2 MG/HR: 10 INJECTION, SOLUTION INTRAMUSCULAR; INTRAVENOUS at 12:57

## 2025-03-21 RX ADMIN — MORPHINE SULFATE 2 MG: 2 INJECTION, SOLUTION INTRAMUSCULAR; INTRAVENOUS at 13:32

## 2025-03-21 RX ADMIN — LORAZEPAM 1 MG: 2 INJECTION INTRAMUSCULAR; INTRAVENOUS at 13:07

## 2025-03-21 RX ADMIN — IPRATROPIUM BROMIDE AND ALBUTEROL SULFATE 1 DOSE: .5; 3 SOLUTION RESPIRATORY (INHALATION) at 07:55

## 2025-03-21 RX ADMIN — IPRATROPIUM BROMIDE AND ALBUTEROL SULFATE 1 DOSE: .5; 2.5 SOLUTION RESPIRATORY (INHALATION) at 06:57

## 2025-03-21 RX ADMIN — WATER 40 MG: 1 INJECTION INTRAMUSCULAR; INTRAVENOUS; SUBCUTANEOUS at 05:31

## 2025-03-21 RX ADMIN — WATER 40 MG: 1 INJECTION INTRAMUSCULAR; INTRAVENOUS; SUBCUTANEOUS at 01:06

## 2025-03-21 RX ADMIN — METHYLPREDNISOLONE SODIUM SUCCINATE 80 MG: 125 INJECTION, POWDER, LYOPHILIZED, FOR SOLUTION INTRAMUSCULAR; INTRAVENOUS at 07:48

## 2025-03-21 RX ADMIN — HYDRALAZINE HYDROCHLORIDE 10 MG: 20 INJECTION INTRAMUSCULAR; INTRAVENOUS at 05:31

## 2025-03-21 RX ADMIN — IPRATROPIUM BROMIDE AND ALBUTEROL SULFATE 1 DOSE: .5; 3 SOLUTION RESPIRATORY (INHALATION) at 07:20

## 2025-03-21 RX ADMIN — IPRATROPIUM BROMIDE AND ALBUTEROL SULFATE 1 DOSE: .5; 2.5 SOLUTION RESPIRATORY (INHALATION) at 10:35

## 2025-03-21 NOTE — CARE COORDINATION
3/21/25 Palliative consult completed, Cinthia/Palliative NP reported that pt & daughter requesting pt to transfer to the hospice house for bi-pap removal & comfort care. Followed up with pt/daughter & confirmed. Referral completed to [facility] by Thuy; accepted for admit to . While on the phone with PAS to arrange transport informed by nurse & Cinthia/Palliative NP that pt & daughter requesting pt to remain in current room & for bi-pap to be taken off & to receive comfort care measures. Cinthia/Palliative NP to place orders & manage comfort measures, hospice [facility] to follow if further intervention/services are needed. Updated [facility]arlene/KIMBER Garcia. CM following. Electronically signed by DELGADO Tom on 3/21/2025 at 2:49 PM

## 2025-03-21 NOTE — PLAN OF CARE
Problem: Chronic Conditions and Co-morbidities  Goal: Patient's chronic conditions and co-morbidity symptoms are monitored and maintained or improved  3/21/2025 0630 by Bj Davila RN  Outcome: Progressing  Flowsheets (Taken 3/20/2025 2000)  Care Plan - Patient's Chronic Conditions and Co-Morbidity Symptoms are Monitored and Maintained or Improved:   Monitor and assess patient's chronic conditions and comorbid symptoms for stability, deterioration, or improvement   Collaborate with multidisciplinary team to address chronic and comorbid conditions and prevent exacerbation or deterioration   Update acute care plan with appropriate goals if chronic or comorbid symptoms are exacerbated and prevent overall improvement and discharge  3/20/2025 1720 by Niesha Parker RN  Outcome: Progressing  Flowsheets (Taken 3/20/2025 0900)  Care Plan - Patient's Chronic Conditions and Co-Morbidity Symptoms are Monitored and Maintained or Improved: Monitor and assess patient's chronic conditions and comorbid symptoms for stability, deterioration, or improvement     Problem: Discharge Planning  Goal: Discharge to home or other facility with appropriate resources  3/21/2025 0630 by Bj Davila RN  Outcome: Progressing  Flowsheets (Taken 3/20/2025 2000)  Discharge to home or other facility with appropriate resources:   Identify barriers to discharge with patient and caregiver   Arrange for needed discharge resources and transportation as appropriate   Identify discharge learning needs (meds, wound care, etc)   Refer to discharge planning if patient needs post-hospital services based on physician order or complex needs related to functional status, cognitive ability or social support system  3/20/2025 1720 by Niesha Parker RN  Outcome: Progressing  Flowsheets (Taken 3/20/2025 0900)  Discharge to home or other facility with appropriate resources: Identify barriers to discharge with patient and caregiver     Problem:

## 2025-03-21 NOTE — CONSULTS
Palliative Care Department  [phone]  Palliative Care Initial Consult  Provider Cinthia Hammer, APRN - CNP     Compa Obrien  [id number]  Hospital Day: 7  Date of Initial Consult: 3/20/2025  Referring Provider:  Brandt Stubbs MD  Palliative Medicine was consulted for assistance with: Goals of care    HPI:   Compa Obrien is a 79 y.o. with a medical history of hypertension, CVA, COPD, ANNIA, HFpEF, CAD, PVD, GERD, peptic ulcer disease, atrial fibrillation, who was admitted on 3/15/2025 from home with a CHIEF COMPLAINT of shortness of breath.  The patient was tachypneic upon presentation requiring BiPAP.  Pulmonology consulted.  CT chest showed new mild patchy tree-in-bud infiltrate in the posterior-lateral left lower lobe and the infrahilar left lower lobe consistent with minimal new left lower lobe pneumonia, Stable tight chronic linear atelectasis of the right middle lobe, collapsed against the anterior major fissure, 5.1 x 5.2 cm rounded mass in the right upper abdomen medial and anterior to the right kidney. This does not appear to arise from either the liver or kidney. It is possible that it is an aneurysm arising from the right side of the infrarenal abdominal aorta. Recommend CT of the abdomen with contrast to further characterize this mass, if the etiology of this mass is not already known, Stable mild T7, T8, and T11 compression fractures.  The patient was an RRT on 3/21 for worsening in shortness of breath and diaphoretic.  Palliative medicine consulted for further assistance.    ASSESSMENT/PLAN:     Pertinent Hospital Diagnoses     Acute on chronic hypoxic and hypercapnic respiratory failure secondary to a community-acquired pneumonia/human metapneumovirus  Pneumonia  COPD exacerbation  Chronic diastolic congestive heart failure  History of CVA  History of CAD      Palliative Care Encounter / Counseling Regarding Goals of Care  Please see detailed goals of care discussion as below  At

## 2025-03-21 NOTE — PLAN OF CARE
Problem: Chronic Conditions and Co-morbidities  Goal: Patient's chronic conditions and co-morbidity symptoms are monitored and maintained or improved  3/21/2025 1847 by Bayron Mccarty RN  Outcome: Progressing  3/21/2025 0630 by Bj Davila RN  Outcome: Progressing  Flowsheets (Taken 3/20/2025 2000)  Care Plan - Patient's Chronic Conditions and Co-Morbidity Symptoms are Monitored and Maintained or Improved:   Monitor and assess patient's chronic conditions and comorbid symptoms for stability, deterioration, or improvement   Collaborate with multidisciplinary team to address chronic and comorbid conditions and prevent exacerbation or deterioration   Update acute care plan with appropriate goals if chronic or comorbid symptoms are exacerbated and prevent overall improvement and discharge     Problem: Discharge Planning  Goal: Discharge to home or other facility with appropriate resources  3/21/2025 1847 by Bayron Mccarty RN  Outcome: Progressing  3/21/2025 0630 by Bj Davila RN  Outcome: Progressing  Flowsheets (Taken 3/20/2025 2000)  Discharge to home or other facility with appropriate resources:   Identify barriers to discharge with patient and caregiver   Arrange for needed discharge resources and transportation as appropriate   Identify discharge learning needs (meds, wound care, etc)   Refer to discharge planning if patient needs post-hospital services based on physician order or complex needs related to functional status, cognitive ability or social support system     Problem: Skin/Tissue Integrity  Goal: Skin integrity remains intact  Description: 1.  Monitor for areas of redness and/or skin breakdown  2.  Assess vascular access sites hourly  3.  Every 4-6 hours minimum:  Change oxygen saturation probe site  4.  Every 4-6 hours:  If on nasal continuous positive airway pressure, respiratory therapy assess nares and determine need for appliance change or resting period  3/21/2025 1847 by Lul

## 2025-03-22 VITALS
OXYGEN SATURATION: 91 % | HEART RATE: 102 BPM | BODY MASS INDEX: 27.58 KG/M2 | WEIGHT: 131.39 LBS | DIASTOLIC BLOOD PRESSURE: 62 MMHG | SYSTOLIC BLOOD PRESSURE: 88 MMHG | HEIGHT: 58 IN | RESPIRATION RATE: 14 BRPM | TEMPERATURE: 97.1 F

## 2025-03-22 PROCEDURE — 2700000000 HC OXYGEN THERAPY PER DAY

## 2025-03-22 PROCEDURE — 99232 SBSQ HOSP IP/OBS MODERATE 35: CPT | Performed by: INTERNAL MEDICINE

## 2025-03-22 PROCEDURE — 2060000000 HC ICU INTERMEDIATE R&B

## 2025-03-22 NOTE — PLAN OF CARE
Problem: Chronic Conditions and Co-morbidities  Goal: Patient's chronic conditions and co-morbidity symptoms are monitored and maintained or improved  3/22/2025 0107 by Cinthia Mckay RN  Outcome: Progressing  3/21/2025 1847 by Bayron Mccarty RN  Outcome: Progressing     Problem: Discharge Planning  Goal: Discharge to home or other facility with appropriate resources  3/22/2025 0107 by Cinthia Mckay RN  Outcome: Progressing  3/21/2025 1847 by Bayron Mccarty RN  Outcome: Progressing     Problem: Skin/Tissue Integrity  Goal: Skin integrity remains intact  Description: 1.  Monitor for areas of redness and/or skin breakdown  2.  Assess vascular access sites hourly  3.  Every 4-6 hours minimum:  Change oxygen saturation probe site  4.  Every 4-6 hours:  If on nasal continuous positive airway pressure, respiratory therapy assess nares and determine need for appliance change or resting period  3/21/2025 1847 by Bayron Mccarty RN  Outcome: Progressing  Flowsheets (Taken 3/21/2025 0800)  Skin Integrity Remains Intact: Monitor for areas of redness and/or skin breakdown     Problem: ABCDS Injury Assessment  Goal: Absence of physical injury  3/21/2025 1847 by Bayron Mccarty RN  Outcome: Progressing     Problem: Safety - Adult  Goal: Free from fall injury  3/21/2025 1847 by Bayron Mccarty RN  Outcome: Progressing

## 2025-03-23 PROCEDURE — 2700000000 HC OXYGEN THERAPY PER DAY

## 2025-03-23 PROCEDURE — 6360000002 HC RX W HCPCS: Performed by: NURSE PRACTITIONER

## 2025-03-23 RX ADMIN — GLYCOPYRROLATE 0.4 MG: 0.2 INJECTION INTRAMUSCULAR; INTRAVENOUS at 04:51

## 2025-03-23 NOTE — DISCHARGE SUMMARY
Internal Medicine Progress Note     [facility]     Obey Valenzuela D.O., ANDIEOFrancisco JavierI.                         Ulysses Funes D.O., ANDIEOFrancisco JaiverIYISEL Quinn, MSN, APRN, NP-C  Wale Garcia, MSN, APRN-CNP  Carlos Alberto Weston, MSN, APRN, NP-C  Ly Gómez, MSN, APRN-CNP  Pat Major, MSN, APRN, NP-C       Internal Medicine  Discharge Summary    NAME: Compa Obrien  :  1945  MRN:  [id number]  PCP:Nigel Alvarado DO  ADMITTED: 3/15/2025      DISCHARGED: 3/23/25    ADMITTING PHYSICIAN: Obey Valenzuela DO    CONSULTANT(S):   IP CONSULT TO PULMONOLOGY  IP CONSULT TO PALLIATIVE CARE  IP CONSULT TO HOSPICE     ADMITTING DIAGNOSIS:   Renal mass [N28.89]  Pneumonia of right lower lobe due to infectious organism [J18.9]  Acute on chronic respiratory failure with hypoxia and hypercapnia [J96.21, J96.22]  Acute on chronic hypoxic respiratory failure [J96.21]     DISCHARGE DIAGNOSES:   Patient was pronounced  at 618 on 3/23/2025  Transition to entire comfort measures and end-of-life care   Acute on chronic hypoxic and hypercapnic respiratory failure secondary to community-acquired pneumonia/Human Metapneumovirus   Large right intrarenal aortic aneurysm with graft/stent in place following with vascular surgery  COPD with acute exacerbation  Chronic diastolic congestive heart failure  History of stroke  History of coronary artery disease status post stent and  angioplasty  History of endovascular repair of abdominal aortic aneurysm  History of peptic ulcer disease  Sleep apnea  GERD  Hypertension  History of tobacco abuse    BRIEF HISTORY OF PRESENT ILLNESS:   Patient is 79-year-old female presents to the ED with shortness of breath.  Patient has been feeling short of breath for the last day or 2.  She admits to cough and wheezing.  She denies any subjective fever or chills.     LABS::  Lab Results   Component Value Date    WBC 10.0 2025

## 2025-03-23 NOTE — PROGRESS NOTES
Physical Therapy    Room #:   0539/0539-02    Date: 3/20/2025       Patient Name: Compa Obrien  : 1945      MRN: [id number]     Patient unavailable for physical therapy treatment due to refusal. Patient states she is too tired and doesn't want to move. Educated importance of mobility and stated to perform ankle pumps, quad sets and glut sets throughout the day when possible. Will attempt PT treatment tomorrow. Thank you.      Linda Hurtado, PTA    
    Physical Therapy    Room #:   0539/0539-02    Date: 3/21/2025       Patient Name: Compa Obrien  : 1945      MRN: [id number]     Patient unavailable for physical therapy treatment due to unavailable/not appropriate per nursing due to RRT today   .Thank you.      Linda Hurtado, PTA    
  [facility]  Department of Internal Medicine  Division of Pulmonary, Critical Care and Sleep Medicine  Progress Note      Emmanuel Sanchez, APRN-CNP  Paulina Neela, APRN-CNP    ICU Intensivist Attestation:    I saw, examined, and discussed the patient with Paulina Keita APRJANENE-ACN and agree with her assessment and plan.    The patient is making slow progress and should remain on IV steroids at the current doses as well as aerosolized bronchodilators inhaled steroids and phosphodiesterase inhibitors.    The patient has been using her BiPAP and wore it all night last night.  However, she appears a bit fatigued at this time.  She has been encouraged to wear BiPAP during the day when she feels she needs it and will continue hopefully to use it all night while we are treating the underlying exacerbation of COPD, likely triggered by a human metapneumovirus infection.    Electronically signed by Brandt Stubbs MD on 3/19/2025 at 6:03 PM      We are following the patient for COPD, emphysema, acute on chronic hypoxic and hypercapnic respiratory failure, human metapneumovirus, community-acquired pneumonia    Subjective:  Patient seen and examined.  Patient was seen on 6 L nasal cannula, no acute distress.  She wore NIV overnight and is much more alert and awake this morning.  Discussed case with RN.       OBJECTIVE:     PHYSICAL EXAM:   VITALS:   Vitals:    03/19/25 0210 03/19/25 0300 03/19/25 0524 03/19/25 0750   BP:  137/83  (!) 161/95   Pulse: 95 96 90 98   Resp: 22 20 19 22   Temp:  97.5 °F (36.4 °C)  97.7 °F (36.5 °C)   TempSrc:  Axillary  Axillary   SpO2: 99% 96% 95% 99%   Weight:       Height:          No intake or output data in the 24 hours ending 03/19/25 1325       CONSTITUTIONAL:   Ill-appearing, lethargic   SKIN:     No rash, No suspicious 
  [facility]  Department of Internal Medicine  Division of Pulmonary, Critical Care and Sleep Medicine  Progress Note      Emmanuel ROSCOE Sanchez, APRN-CNP  Paulina Adolfogeoffrey, APRN-CNP    ICU Intensivist Attestation:    I saw, examined, and discussed the patient with Paulina LOPES-ACNP and agree with her assessment and plan.    The patient is having a bit of a rough day probably secondary to low barometric pressure from the poor weather.    She will need to continue IV steroids aerosolized bronchodilators IV antibiotics and will need to be on BiPAP most of the time except when she is eating.    We will continue close monitoring.  It may be worthwhile having palliative care see the patient with regard to the patient's expectations and prognosis.    Electronically signed by Brandt Stubbs MD on 3/20/2025 at 5:35 PM      We are following the patient for COPD, emphysema, acute on chronic hypoxic and hypercapnic respiratory failure, human metapneumovirus, community-acquired pneumonia    Subjective:  Patient seen and examined.  Patient was seen on BiPAP 13/7.  She reports significant shortness of breath with accessory muscle use and is questioning if her shortness of breath is related to her heart.  Discussed case with RN at the bedside.  Stat chest x-ray and ABGs were ordered.      OBJECTIVE:     PHYSICAL EXAM:   VITALS:   Vitals:    03/20/25 0109 03/20/25 0556 03/20/25 0715 03/20/25 1137   BP: (!) 141/71 (!) 123/98 (!) 154/99 (!) 165/94   Pulse: (!) 107 (!) 108 (!) 105 99   Resp: 21 20 22 21   Temp: 97.3 °F (36.3 °C) 97.5 °F (36.4 °C) 97.2 °F (36.2 °C) 97.8 °F (36.6 °C)   TempSrc: Axillary Oral Axillary Axillary   SpO2: 100% 97% 92% 94%   Weight:       Height:          No intake or output data in the 24 hours ending 03/20/25 1439       CONSTITUTIONAL: 
  Physician Progress Note      PATIENT:               LESLY REED  CSN #:                  [id number]  :                       1945  ADMIT DATE:       3/15/2025 4:33 PM  DISCH DATE:  RESPONDING  PROVIDER #:        Obey Valenzuela DO          QUERY TEXT:    Pt admitted with Acute hypoxic and hypercapnic respiratory failure. Pt noted   to have documentation of confusion in 3/18 PN. Per pulmonology pt has been   refusing NIV. ABG's were ordered, pco2 80.8, BIPAP setting changed.  If possible, please document in the progress notes and discharge summary if   you are evaluating and / or treating any of the following:    The medical record reflects the following:  Risk Factors: hypercapnic respiratory failure, refusal to wear NIV  Clinical Indicators: 3/18 pco2 80.8  3/18 pulmonology PN \"Apparently patient has been refusing NIV.  Per nursing,   patient had some confusion this morning.  ABGs are pending at this time.\"  3/18 PN \"Apparently, the patient was noncompliant with BiPAP therapy overnight   and has become progressively more altered throughout the morning.  She is   wearing BiPAP during my examination and stat arterial blood gases reveal   elevated CO2. Per her nurse, she was encouraged to wear the BiPAP now and has   been more comfortable but has been notably altered despite this intervention.  Treatment: ABG's, BIPAP setting adjustment  Options provided:  -- Metabolic encephalopathy  -- Other - I will add my own diagnosis  -- Disagree - Not applicable / Not valid  -- Disagree - Clinically unable to determine / Unknown  -- Refer to Clinical Documentation Reviewer    PROVIDER RESPONSE TEXT:    This patient has metabolic encephalopathy.    Query created by: Charlene Pompa on 3/18/2025 3:42 PM      Electronically signed by:  Obey Valenzuela DO 3/19/2025 6:00 AM          
  [facility]  Department of Internal Medicine  Division of Pulmonary, Critical Care and Sleep Medicine  Progress Note      Emmanuel ROSCOE Llanesmae DO Gage Sanchez, APRN-CNP  Paulina Neela, APRN-CNP    ICU Intensivist Attestation:    I saw, examined, and discussed the patient with Paulina Keita APRJANENE-ACN and agree with her assessment and plan.    The patient will need an adjustment in her BiPAP which is currently 15/8 and I do not understand why since yesterday it was 10/6.  We will change her BiPAP order in the chart to 13/7 which should allow her to tolerate the BiPAP better and benefit from it more.    We will add ceftriaxone to the azithromycin and continue IV steroids as written with the taper from today.  We will add Roflumilast as well.        Electronically signed by Brandt Stubbs MD on 3/18/2025 at 6:00 PM      We are following the patient for COPD, emphysema, acute on chronic hypoxic and hypercapnic respiratory failure, human metapneumovirus, community-acquired pneumonia    Subjective:  Patient seen and examined.  Patient was seen on BiPAP 10/6, 50% FiO2 during time of evaluation.  Apparently patient has been refusing NIV.  Per nursing, patient had some confusion this morning.  ABGs are pending at this time.      OBJECTIVE:     PHYSICAL EXAM:   VITALS:   Vitals:    03/17/25 2025 03/18/25 0001 03/18/25 0412 03/18/25 0729   BP: 130/72 (!) 157/82 102/62 114/65   Pulse: 80 83 79 82   Resp: 23 26 22 21   Temp: 97.3 °F (36.3 °C) 97.2 °F (36.2 °C) 97.5 °F (36.4 °C) 97.8 °F (36.6 °C)   TempSrc: Oral Oral Oral Oral   SpO2: 94% 94% 91% 95%   Weight:       Height:            Intake/Output Summary (Last 24 hours) at 3/18/2025 1036  Last data filed at 3/17/2025 2025  Gross per 24 hour   Intake --   Output 1200 ml   Net -1200 ml        CONSTITUTIONAL:   Ill-appearing, 
A rapid response was called this morning and I came to the bedside.  The patient was in significant distress and required additional doses of steroids aerosolized bronchodilators, and adjustment of her BiPAP.    However, the patient told me at the time of the rapid response that she did not want to be intubated under any circumstances.    The patient's daughter later told me that she has been on a ventilator before and did not ever want to do it again.  Therefore, after consultation and reconfirmation with the patient and family, palliative care service changed her CODE STATUS to DNR CC and comfort care orders have been entered.    The patient is resting quietly with her daughter in attendance.  We will sign off at this point.  
ABG drawn x 1 from Left Radial. Patient had NormalAllen's Test.  Patient was on 50% O2 via  Bipap 15/8 at time of puncture. Pressure held for 5. No bleeding or bruising noted at puncture site. Patient tolerated procedure well.      Performed by Mari Lawson RCP    
ABG drawn x 1 from Right Radial at 0800. Patient had NormalAllen's Test. Patient was on 50% O2 via  Bipap 13/8  at time of puncture. Pressure held for 5 No bleeding or bruising noted at puncture site. Patient tolerated procedure well.      Performed by Mari Lawson RCP    
Assessment and Plan  Patient is a 79 y.o. female with the following medical Problems:   Acute on chronic hypoxic and hypercapnic respiratory failure requiring BiPAP.  (O2 baseline 4-5 L, home BIPAP, but not compliant.)  Acute exacerbation of COPD.   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  She is currently on supplemental oxygen..    History of Present Illness:   Patient is a 79-year-old former smoker woman who presented to the emergency room with progressive shortness of breath and cough.  She was tachypneic upon presentation requiring BiPAP.  Symptoms improved over the last 24 hours.  Patient had mild leukocytosis and was started on ceftriaxone and doxycycline.  Antibiotics were changed to ceftriaxone and azithromycin.    Daily progress:  March 22, 2025: Patient is comfortable with morphine infusion.  Family was at the bedside.  He receives as needed Ativan.    Past Medical History:  Past Medical History:   Diagnosis Date    Hypertension     Stroke (HCC)       Past Surgical History:   Procedure Laterality Date    TONSILLECTOMY         Family History:   Family History   Problem Relation Age of Onset    High Blood Pressure Mother     Other Father     High Blood Pressure Father        Allergies:         Patient has no known allergies.    Social history:  Social History     Socioeconomic History    Marital status:      Spouse name: Not on file    Number of children: Not on file    Years of 
Comprehensive Nutrition Assessment    Type and Reason for Visit:  Initial, Positive nutrition screen (MST 2)    Nutrition Recommendations/Plan:   Continue Current Diet (Regular, Low Na+, 2000mls)  Begin ONS (high rocio/high protein) once daily  Consult RD as needed      Malnutrition Assessment:  Malnutrition Status:  At risk for malnutrition (03/17/25 1456)    Context:  Acute Illness     Findings of the 6 clinical characteristics of malnutrition:  Energy Intake:  Mild decrease in energy intake  Weight Loss:  No weight loss     Body Fat Loss:  Unable to assess (isolation)     Muscle Mass Loss:  Unable to assess (isolation)    Fluid Accumulation:  Mild Extremities   Strength:  Not Performed    Nutrition Assessment:    Pt admit w/ Acute on Chronic Respiratory failure 2/2 PNA and Renal Mass. PMHx: COPD, HTn, Stroke, CAD w/ stent, CHF, AAA, PUD, ANNIA, GERD. Pt reports poor po intake prior to admission, however appetite is slowly increasing. Will provide ONS once daily, continue inpatient monitoring, f/up per policy.    Nutrition Related Findings:    A/O x4, I/O +10 since admit, abd wdl, +BS x4, Trace Edema, WBC 12.6, 4L O2 baseline, K+ 3.2, A-fib Wound Type: Skin Tears (R- posterior ulnar skin tear)       Current Nutrition Intake & Therapies:    Average Meal Intake: 51-75%  Average Supplements Intake: None Ordered  ADULT DIET; Regular; Low Sodium (2 gm); 2000 ml  ADULT ORAL NUTRITION SUPPLEMENT; Dinner, Breakfast; Standard High Calorie/High Protein Oral Supplement    Anthropometric Measures:  Height: 147.3 cm (4' 9.99\")  Ideal Body Weight (IBW): 90 lbs (41 kg)    Admission Body Weight: 59.6 kg (131 lb 6.3 oz) (03/15/25 stated)  Current Body Weight: 59.6 kg (131 lb 6.3 oz), 146 % IBW. Weight Source: Stated (03/15/25)  Current BMI (kg/m2): 27.5  Usual Body Weight: 57 kg (125 lb 10.6 oz) (11/12/2024 bed scale)     % Weight Change (Calculated): 4.6  Weight Adjustment For: No Adjustment     BMI Categories: Overweight (BMI 
Dr Funes notified of patient TOD 0643.  
Internal Medicine Consult Note    [facility]    Obey Valenzuela D.O., ANDIEOFrancisco JavierI.                    Ulysses Funes D.O., ANDIEOFrancisco JavierI.                             Kulwinder Constantino D.O.     Amita Valdez, MSN, APRN, NP-C  Wale Garcia, MSN, APRN-CNP  Carlos Alberto Weston, MSN, APRN-CNP  Ly Gómez, MSN APRN-CNP  Pat Major, MSN. APRN-NP-C     Primary Care Physician: Nigel Alvarado DO   Admitting Physician:  Obey Valenzuela DO  Admission date and time: 3/15/2025  4:33 PM    Room:  [address] [place] [address]  Admitting diagnosis: Renal mass [N28.89]  Pneumonia of right lower lobe due to infectious organism [J18.9]  Acute on chronic respiratory failure with hypoxia and hypercapnia (HCC) [J96.21, J96.22]  Acute on chronic hypoxic respiratory failure (HCC) [J96.21]    Patient Name: Compa Obrien  MRN: [id number]    Date of Service: 3/19/2025     Subjective:  Compa is a 79 y.o. female who was seen and examined today,3/19/2025, at the bedside.  Patient is resting comfortably on her BiPAP.  She stated that she was getting tired and wanted back on it.  Arterial blood gases were done yesterday that showed a CO2 of 78.4.  The patient highly encouraged to continue to use her BiPAP during daytime naps and while she is sleeping.    Review of System:   Constitutional:   Denies fever or chills, weight loss or gain, ongoing fatigue  HEENT:   Denies ear pain, sore throat, sinus or eye problems.  Cardiovascular:   Denies any chest pain, irregular heartbeats, or palpitations.   Respiratory:   Denies coughing, sputum production, hemoptysis, or wheezing.  Reports improving shortness of breath  Gastrointestinal:   Denies nausea, vomiting, diarrhea, or constipation.  Denies any abdominal pain.  Genitourinary:    Denies any urgency, frequency, hematuria. Voiding  without difficulty.  Extremities:   Denies lower extremity swelling, edema or cyanosis.   Neurology:    Denies any headache or focal neurological 
Internal Medicine Consult Note    [facility]    Obey Valenzuela D.O., ANDIEOFrancisco JavierI.                    Ulysses Funes D.O., ANDIEOFrancisco JavierI.                             Kulwinder Constantino D.O.     Amita Valdez, MSN, APRN, NP-C  Wale Garcia, MSN, APRN-CNP  Carlos Alberto Weston, MSN, APRN-CNP  Ly Gómez, MSN APRN-CNP  Pat Major, MSN. APRN-NP-C     Primary Care Physician: Nigel Alvarado DO   Admitting Physician:  Obey Valenzuela DO  Admission date and time: 3/15/2025  4:33 PM    Room:  [address] [place] [address]  Admitting diagnosis: Renal mass [N28.89]  Pneumonia of right lower lobe due to infectious organism [J18.9]  Acute on chronic respiratory failure with hypoxia and hypercapnia (HCC) [J96.21, J96.22]  Acute on chronic hypoxic respiratory failure (HCC) [J96.21]    Patient Name: Compa Obrien  MRN: [id number]    Date of Service: 3/16/2025     Subjective:  Compa is a 79 y.o. female who was seen and examined today,3/16/2025, at the bedside.  Patient was seen examined emergency department.  She is currently using her BiPAP.  Patient states that she is feeling better and is breathing easier now.  Will continue BiPAP therapy, consult pulmonology and continue IV fluids.    No family present during my examination.    Review of System:   Constitutional:   Denies fever or chills, weight loss or gain, positive fatigue  HEENT:   Denies ear pain, sore throat, sinus or eye problems.  Cardiovascular:   Denies any chest pain, irregular heartbeats, or palpitations.   Respiratory:   Denies coughing, sputum production, hemoptysis, or wheezing.  Reports shortness of breath  Gastrointestinal:   Denies nausea, vomiting, diarrhea, or constipation.  Denies any abdominal pain.  Genitourinary:    Denies any urgency, frequency, hematuria. Voiding  without difficulty.  Extremities:   Denies lower extremity swelling, edema or cyanosis.   Neurology:    Denies any headache or focal neurological deficits, Denies 
Internal Medicine Consult Note    [facility]    Obey Valenzuela D.O., ANDIEOFrancisco JavierI.                    Ulysses Funes D.O., ANDIEOFrancisco JavierI.                             Kulwinder Constantino D.O.     Amita Valdez, MSN, APRN, NP-C  Wale Garcia, MSN, APRN-CNP  Carlos Alberto Weston, MSN, APRN-CNP  Ly Gómez, MSN APRN-CNP  Pat Major, MSN. APRN-NP-C     Primary Care Physician: Nigel Alvarado DO   Admitting Physician:  Obey Valenzuela DO  Admission date and time: 3/15/2025  4:33 PM    Room:  [address] [place] [address]  Admitting diagnosis: Renal mass [N28.89]  Pneumonia of right lower lobe due to infectious organism [J18.9]  Acute on chronic respiratory failure with hypoxia and hypercapnia (HCC) [J96.21, J96.22]  Acute on chronic hypoxic respiratory failure (HCC) [J96.21]    Patient Name: Compa Obrien  MRN: [id number]    Date of Service: 3/17/2025     Subjective:  Compa is a 79 y.o. female who was seen and examined today,3/17/2025, at the bedside.  She is feeling much better overall.  She is near her baseline oxygen requirements.  We have encouraged increased activity and ambulation.  Pulmonary recommendations have been noted.  Family is updated at bedside.      Review of System:   Constitutional:   Denies fever or chills, weight loss or gain, positive fatigue/malaise  HEENT:   Denies ear pain, sore throat, sinus or eye problems.  Cardiovascular:   Denies any chest pain or palpitations.   Respiratory:   Less shortness of breath at rest and on exertion.  Coughing at times without sputum or hemoptysis.  Less wheezing.  Gastrointestinal:   Denies nausea, vomiting, diarrhea, or constipation.  Denies any abdominal pain.  Genitourinary:    Denies any urgency, frequency, hematuria. Voiding  without difficulty.  Extremities:   Denies lower extremity swelling, edema or cyanosis.   Neurology:    Denies any headache or focal neurological deficits, positive for mild generalized weakness without focal 
Internal Medicine Consult Note    [facility]    Obey Valenzuela D.O., ANDIEOFrancisco JavierI.                    Ulysses Funes D.O., ANDIEOFrancisco JavierIFrancisco Javier Constantino D.O.     Amita Valdez, MSN, APRN, NP-C  Wale Garcia, MSN, APRN-CNP  Carlos Alberto Weston, MSN, APRN-CNP  Ly Gómez, MSN APRN-CNP  Pat Major, MSN. APRN-NP-C     Primary Care Physician: Nigel Alvarado DO   Admitting Physician:  Obey Valenzuela DO  Admission date and time: 3/15/2025  4:33 PM    Room:  [address] [place] [address]  Admitting diagnosis: Renal mass [N28.89]  Pneumonia of right lower lobe due to infectious organism [J18.9]  Acute on chronic respiratory failure with hypoxia and hypercapnia (HCC) [J96.21, J96.22]  Acute on chronic hypoxic respiratory failure (HCC) [J96.21]    Patient Name: Compa Obrien  MRN: [id number]    Date of Service: 3/20/2025     Subjective:  Compa is a 79 y.o. female who was seen and examined today,3/20/2025, at the bedside.  Patient appears anxious while wearing her BiPAP.  She she states she did wear her BiPAP during the night.  Arterial blood gases this morning did show a slight decline in her CO2 from 78.4 down to 64.  She currently has a chest x-ray pending.  Review of System:   Constitutional:   Denies fever or chills, weight loss or gain, ongoing fatigue  HEENT:   Denies ear pain, sore throat, sinus or eye problems.  Cardiovascular:   Denies any chest pain, irregular heartbeats, or palpitations.   Respiratory:   Denies coughing, sputum production, hemoptysis, or wheezing.  Reports  shortness of breath  Gastrointestinal:   Denies nausea, vomiting, diarrhea, or constipation.  Denies any abdominal pain.  Genitourinary:    Denies any urgency, frequency, hematuria. Voiding  without difficulty.  Extremities:   Denies lower extremity swelling, edema or cyanosis.   Neurology:    Denies any headache or focal neurological deficits, Denies generalized weakness or memory 
Internal Medicine Consult Note    [facility]    Obey Valenzuela D.O., ANDIEOFrancisco JavierI.                    Ulysses Funes D.O., ANDIEOFrancisco JavierIFrancisco Javier Constantino D.O.     Amita Valdez, MSN, APRN, NP-C  Wale Garcia, MSN, APRN-CNP  Carlos Alberto Weston, MSN, APRN-CNP  Ly Gómez, MSN APRN-CNP  Pat Major, MSN. APRN-NP-C     Primary Care Physician: Nigel Alvarado DO   Admitting Physician:  Obey Valenzuela DO  Admission date and time: 3/15/2025  4:33 PM    Room:  [address] [place] [address]  Admitting diagnosis: Renal mass [N28.89]  Pneumonia of right lower lobe due to infectious organism [J18.9]  Acute on chronic respiratory failure with hypoxia and hypercapnia [J96.21, J96.22]  Acute on chronic hypoxic respiratory failure [J96.21]    Patient Name: Compa Obrien  MRN: [id number]    Date of Service: 3/22/2025     Subjective:  Compa is a 79 y.o. female who was seen and examined today,3/22/2025, at the bedside.  She was transition to entire comfort measures and inpatient end-of-life services yesterday.  We have discussed appropriate medication titration with the patient's nurse at bedside.  No family members are present on my exam.     Review of System: Unable to assess due to patient's respiratory distress and increased fatigue    Physical Exam:  No intake/output data recorded.  No intake or output data in the 24 hours ending 03/22/25 1033      I/O last 3 completed shifts:  In: -   Out: 275 [Urine:275]  No data found.    Vital Signs:   Blood pressure (!) 88/62, pulse 96, temperature 97.1 °F (36.2 °C), temperature source Axillary, resp. rate 18, height 1.473 m (4' 9.99\"), weight 59.6 kg (131 lb 6.3 oz), SpO2 99%, not currently breastfeeding.    General appearance:  Very drowsy but arousable, appears comfortable presently  Head:  Normocephalic. No masses, lesions or tenderness.  Eyes:  PERRLA.  EOMI.  Sclera clear.  Buccal mucosa dry  ENT:  Ears normal. Mucosa normal.  
Internal Medicine Consult Note    [facility]    Obey Valenzuela D.O., ANDIEOFrancisco JavierI.                    Ulysses Funes D.O., ANDIEOJOSH Constantino D.O.     Amita Valdez, MSN, APRN, NP-C  Wale Garcia, MSN, APRN-CNP  Carlos Alberto Weston, MSN, APRN-CNP  Ly Gómez, MSN APRN-CNP  Pat Major, MSN. APRN-NP-C     Primary Care Physician: Nigel Alvarado DO   Admitting Physician:  Obey Valenzuela DO  Admission date and time: 3/15/2025  4:33 PM    Room:  [address] [place] [address]  Admitting diagnosis: Renal mass [N28.89]  Pneumonia of right lower lobe due to infectious organism [J18.9]  Acute on chronic respiratory failure with hypoxia and hypercapnia [J96.21, J96.22]  Acute on chronic hypoxic respiratory failure [J96.21]    Patient Name: Compa Obrien  MRN: [id number]    Date of Service: 3/21/2025     Subjective:  Compa is a 79 y.o. female who was seen and examined today,3/21/2025, at the bedside.  Patient had a turn for the worse this morning and was in Respiratory distress .  Rapid response was called the patient did not want to be mechanically ventilated.  Family was present.  CODE STATUS was changed to DNR CC.  Myself and Dr. Valenzuela spoke to the family and patient.  They did not want any more aggressive measures taken and comfort measures have been undertaken.    Review of System: Unable to assess due to patient's respiratory distress and increased fatigue    Physical Exam:  No intake/output data recorded.    Intake/Output Summary (Last 24 hours) at 3/21/2025 1822  Last data filed at 3/21/2025 0527  Gross per 24 hour   Intake --   Output 275 ml   Net -275 ml       I/O last 3 completed shifts:  In: -   Out: 275 [Urine:275]  No data found.    Vital Signs:   Blood pressure (!) 177/94, pulse (!) 103, temperature 97.3 °F (36.3 °C), temperature source Axillary, resp. rate (!) 32, height 1.473 m (4' 9.99\"), weight 59.6 kg (131 lb 6.3 oz), SpO2 (!) 89%, not 
Internal Medicine Consult Note    [facility]    Obey Valenzuela D.O., ANDIEOFrancisco JavierI.                    Ulysses Funes D.O., ANDIEOJOSH Constantino D.O.     Amita Valdez, MSN, APRN, NP-C  Wale Garcia, MSN, APRN-CNP  Carlos Alberto Weston, MSN, APRN-CNP  Ly Gómez, MSN APRN-CNP  Pat Major, MSN. APRN-NP-C     Primary Care Physician: Nigel Alvarado DO   Admitting Physician:  Obey Valenzuela DO  Admission date and time: 3/15/2025  4:33 PM    Room:  [address] [place] [address]  Admitting diagnosis: Renal mass [N28.89]  Pneumonia of right lower lobe due to infectious organism [J18.9]  Acute on chronic respiratory failure with hypoxia and hypercapnia (HCC) [J96.21, J96.22]  Acute on chronic hypoxic respiratory failure (HCC) [J96.21]    Patient Name: Compa Obrien  MRN: [id number]    Date of Service: 3/18/2025     Subjective:  Compa is a 79 y.o. female who was seen and examined today,3/18/2025, at the bedside.  She apparently refused BiPAP all evening and has been increasingly somnolent today.  Per her nurse, she was encouraged to wear the BiPAP now and has been more comfortable but has been notably altered despite this intervention.  An arterial blood gas has been ordered and we have relayed this to the pulmonary nurse practitioner.  The patient herself is unable to provide much subjective information secondary to altered mental status.  No family members are present on my examination.      Review of System: Unable to be obtained secondary to confusion    Physical Exam:  No intake/output data recorded.    Intake/Output Summary (Last 24 hours) at 3/18/2025 1056  Last data filed at 3/17/2025 2025  Gross per 24 hour   Intake --   Output 1200 ml   Net -1200 ml     I/O last 3 completed shifts:  In: -   Out: 1200 [Urine:1200]  Patient Vitals for the past 96 hrs (Last 3 readings):   Weight   03/16/25 1249 59.6 kg (131 lb 6.3 oz)   03/15/25 1640 51.7 kg (114 lb) 
OCCUPATIONAL THERAPY INITIAL EVALUATION    [facility]  [address] [place]        Date:3/19/2025                                                  Patient Name: Compa Obrien    MRN: [id number]    : 1945    Room: [facility]39-      Evaluating OT: Rula Echeverria OTR/L [id number]     Referring Provider and Specific Provider Orders/Date:      25  OT eval and treat  Start:  25,   End:  25,   ONE TIME,   Standing Count:  1 Occurrences,   R         Wale Garcia, APRN - CNP      Placement Recommendation: Subacute Rehab        Diagnosis:   1. Acute on chronic respiratory failure with hypoxia and hypercapnia (HCC)    2. Pneumonia of right lower lobe due to infectious organism    3. Renal mass         Surgery: none      Pertinent Medical History:       Past Medical History:   Diagnosis Date    Hypertension     Stroke (HCC)          Past Surgical History:   Procedure Laterality Date    TONSILLECTOMY        Precautions:  Fall Risk, Contact isolation/ Human [place] pneumovirus, SOB     Assessment of current deficits:     [x] Functional mobility  [x]ADLs  [x] Strength               []Cognition    [x] Functional transfers   [x] IADLs         [] Safety Awareness   [x]Endurance    [] Fine Coordination              [x] Balance      [] Vision/perception   []Sensation     []Gross Motor Coordination  [] ROM  [] Delirium                   [] Motor Control     OT PLAN OF CARE   OT POC based on physician orders, patient diagnosis and results of clinical assessment    Frequency/Duration 1-3 days/wk for 2 weeks PRN     Specific OT Treatment Interventions to include:   * Instruction/training on adapted ADL techniques and AE recommendations to increase functional independence within precautions       * Training on energy conservation strategies, correct breathing pattern and techniques to improve independence/tolerance for self-care 
Patient exhibited increased confusion throughout the night. Patient did agree to wear bipap after refusing several times. Electronically signed by Bj Davila RN on 3/21/2025 at 5:15 AM   
Patient refused blood sugars, states she is not a diabetic.   
Patient refusing to wear bipap! Electronically signed by Bj Davila RN on 3/21/2025 at 1:09 AM   
Physical Therapy  Physical Therapy Initial Evaluation/Plan of Care    Room #:  0539/0539-02  Patient Name: Compa Obrien  YOB: 1945  MRN: [id number]    Date of Service: 3/19/2025     Tentative placement recommendation: Subacute Rehab  Equipment recommendation: To be determined      Evaluating Physical Therapist: Rafael Ruth PT, DPT #[id number]      Specific Provider Orders/Date/Referring Provider :     03/17/25 1315    PT eval and treat  Start:  03/17/25 1315,   End:  03/17/25 1315,   ONE TIME,   Standing Count:  1 Occurrences,   R       Wale Garcia, APRN - CNP Acknowledge New      Admitting Diagnosis:   Renal mass [N28.89]  Pneumonia of right lower lobe due to infectious organism [J18.9]  Acute on chronic respiratory failure with hypoxia and hypercapnia (HCC) [J96.21, J96.22]  Acute on chronic hypoxic respiratory failure (HCC) [J96.21]      Surgery: none  Visit Diagnoses         Codes      Acute on chronic respiratory failure with hypoxia and hypercapnia (HCC)    -  Primary J96.21, J96.22      Pneumonia of right lower lobe due to infectious organism     J18.9      Renal mass     N28.89            Patient Active Problem List   Diagnosis    Hypertension    Chronic obstructive pulmonary disease (HCC)    Mixed hyperlipidemia    Chronic systolic congestive heart failure (HCC)    H/O: CVA (cerebrovascular accident)    History of coronary angioplasty with insertion of stent    H/O peptic ulcer    H/O: GI bleed    Infrarenal abdominal aortic aneurysm (AAA) without rupture    Acute on chronic hypoxic respiratory failure (HCC)        ASSESSMENT of Current Deficits Patient exhibits decreased strength, balance, and endurance impairing functional mobility, transfers, gait , gait distance, and tolerance to activity. Pt weak, deconditioned, and very SOB during session limiting participation with long seated breathing breaks needed between all function. Pt required Mary with HHA for transfer to chair and 
Spiritual Health History and Assessment/Progress Note  [facility]    Initial Encounter, Spiritual/Emotional Needs,  ,  ,      Name: Compa Obrien MRN: [id number]    Age: 79 y.o.     Sex: female   Language: English   Uatsdin: [employer]   Acute on chronic hypoxic respiratory failure (HCC)     Date: 3/17/2025                           Spiritual Assessment began in [facility] PIC/ICU        Referral/Consult From: Rounding   Encounter Overview/Reason: Initial Encounter, Spiritual/Emotional Needs  Service Provided For: Patient    Martha, Belief, Meaning:   Patient has beliefs or practices that help with coping during difficult times  Family/Friends No family/friends present      Importance and Influence:  Patient has spiritual/personal beliefs that influence decisions regarding their health  Family/Friends No family/friends present    Community:  Patient feels well-supported. Support system includes: Extended family  Family/Friends No family/friends present    Assessment and Plan of Care:     Patient Interventions include: Engaged in theological reflection  Family/Friends Interventions include: No family/friends present    Patient Plan of Care: Spiritual Care available upon further referral  Family/Friends Plan of Care: No family/friends present    Electronically signed by Chaplain Gilson on 3/17/2025 at 3:54 PM   
lung base.       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,3 s/p RHC  Large infrarenal aortic aneurysm with history of endovascular repair of abdominal aortic aneurysm  HFpEF  CAD s/p stent to LAD 9/2024  Chronic atrial fibrillation  Prior history of stroke  Previous tobacco use >100 py's        Plan:     Wean FiO2 as tolerated, baseline 5 L  Continue BiPAP at bedtime and as needed-encourage compliance  Scheduled budesonide and DuoNebs  Taper Solu-Medrol 40 mg to every 12 hours  Continue antibiotics with azithromycin and ceftriaxone x 5 days  Diurese as tolerated-- lasix on hold, remains on diamox   Incentive spirometry, flutter valve  PT/OT, out of bed as tolerated  DVT prophylaxis on Eliquis  DC Diamox  Change BiPAP settings to 10/6 FiO2 0.30 with a backup rate of 10        MARIANNE Stern - NP   ICU/PULMONARY Staff Physician note of personal involvement in Care  As the attending physician, I certify that I personally reviewed the patient’s history and personnally examined the patient to confirm the physical findings described above,  And that I reviewed the relevant imaging studies and available reports.  I also discussed the differential diagnosis and all of the proposed management plans with the patient and individuals accompanying the patient to this visit.  They had the opportunity to ask questions about the proposed management plans and to have those questions answered.     This patient has a high probability of sudden, clinically significant deterioration, which requires the highest level of physician preparedness to intervene urgently.  I managed/supervised life or organ supporting interventions that required frequent physician assessment.   I devoted my full attention to the direct care of this patient for the amount of time indicated below.  Time I

## 2025-03-23 NOTE — PLAN OF CARE
Problem: Chronic Conditions and Co-morbidities  Goal: Patient's chronic conditions and co-morbidity symptoms are monitored and maintained or improved  Outcome: Progressing  Flowsheets (Taken 3/22/2025 2010)  Care Plan - Patient's Chronic Conditions and Co-Morbidity Symptoms are Monitored and Maintained or Improved: Monitor and assess patient's chronic conditions and comorbid symptoms for stability, deterioration, or improvement     Problem: Discharge Planning  Goal: Discharge to home or other facility with appropriate resources  Outcome: Progressing     Problem: Skin/Tissue Integrity  Goal: Skin integrity remains intact  Description: 1.  Monitor for areas of redness and/or skin breakdown  2.  Assess vascular access sites hourly  3.  Every 4-6 hours minimum:  Change oxygen saturation probe site  4.  Every 4-6 hours:  If on nasal continuous positive airway pressure, respiratory therapy assess nares and determine need for appliance change or resting period  Outcome: Progressing  Flowsheets (Taken 3/22/2025 2010)  Skin Integrity Remains Intact: Monitor for areas of redness and/or skin breakdown     Problem: ABCDS Injury Assessment  Goal: Absence of physical injury  Outcome: Progressing     Problem: Safety - Adult  Goal: Free from fall injury  Outcome: Progressing     Problem: Nutrition Deficit:  Goal: Optimize nutritional status  Outcome: Progressing     Problem: Pain  Goal: Verbalizes/displays adequate comfort level or baseline comfort level  Outcome: Progressing  Flowsheets (Taken 3/22/2025 2010)  Verbalizes/displays adequate comfort level or baseline comfort level: Assess pain using appropriate pain scale

## 2025-03-23 NOTE — DEATH NOTES
Death Pronouncement Note  Patient's Name: Compa Obrien   Patient's YOB: 1945  MRN Number: [id number]    Admitting Provider: Obey Valenzuela DO  Attending Provider: Obey Valenzuela DO    Patient was examined and the following were absent: Pulses, Blood Pressure, and Respiratory effort    I declared the patient dead on 3/23/2025   at 618    Preliminary Cause of Death:   Acute respiratory failure    Electronically signed by Ulysses Funes DO on 3/23/25 at 6:28 AM EDT